# Patient Record
Sex: MALE | Race: WHITE | Employment: OTHER | URBAN - METROPOLITAN AREA
[De-identification: names, ages, dates, MRNs, and addresses within clinical notes are randomized per-mention and may not be internally consistent; named-entity substitution may affect disease eponyms.]

---

## 2017-01-30 ENCOUNTER — TELEPHONE (OUTPATIENT)
Dept: INTERNAL MEDICINE CLINIC | Facility: CLINIC | Age: 82
End: 2017-01-30

## 2017-01-30 RX ORDER — METOPROLOL TARTRATE 50 MG/1
TABLET, FILM COATED ORAL
Qty: 180 TABLET | Refills: 3 | Status: SHIPPED | OUTPATIENT
Start: 2017-01-30 | End: 2018-01-25

## 2017-01-30 NOTE — TELEPHONE ENCOUNTER
[facility] calling - pt is out of meds -     Pt nds a refill for Metoprolol 50mg     Asking for a 90 day supply with one year of refills     [facility] in [place] calling

## 2017-05-23 ENCOUNTER — TELEPHONE (OUTPATIENT)
Dept: INTERNAL MEDICINE CLINIC | Facility: CLINIC | Age: 82
End: 2017-05-23

## 2017-05-23 ENCOUNTER — OFFICE VISIT (OUTPATIENT)
Dept: INTERNAL MEDICINE CLINIC | Facility: CLINIC | Age: 82
End: 2017-05-23

## 2017-05-23 VITALS
DIASTOLIC BLOOD PRESSURE: 66 MMHG | TEMPERATURE: 99 F | OXYGEN SATURATION: 97 % | WEIGHT: 206.19 LBS | RESPIRATION RATE: 14 BRPM | SYSTOLIC BLOOD PRESSURE: 126 MMHG | HEART RATE: 74 BPM | BODY MASS INDEX: 29 KG/M2

## 2017-05-23 DIAGNOSIS — E78.5 HYPERLIPIDEMIA, UNSPECIFIED HYPERLIPIDEMIA TYPE: ICD-10-CM

## 2017-05-23 DIAGNOSIS — Z98.890 HISTORY OF NISSEN FUNDOPLICATION: ICD-10-CM

## 2017-05-23 DIAGNOSIS — Z00.00 ROUTINE HEALTH MAINTENANCE: ICD-10-CM

## 2017-05-23 DIAGNOSIS — R94.6 ABNORMAL THYROID EXAM: ICD-10-CM

## 2017-05-23 DIAGNOSIS — L02.91 CUTANEOUS ABSCESS, UNSPECIFIED SITE: ICD-10-CM

## 2017-05-23 DIAGNOSIS — I10 ESSENTIAL HYPERTENSION WITH GOAL BLOOD PRESSURE LESS THAN 140/90: ICD-10-CM

## 2017-05-23 DIAGNOSIS — I25.10 CORONARY ARTERY DISEASE INVOLVING NATIVE CORONARY ARTERY OF NATIVE HEART WITHOUT ANGINA PECTORIS: ICD-10-CM

## 2017-05-23 DIAGNOSIS — R13.12 OROPHARYNGEAL DYSPHAGIA: ICD-10-CM

## 2017-05-23 DIAGNOSIS — I10 ESSENTIAL HYPERTENSION: Primary | ICD-10-CM

## 2017-05-23 PROCEDURE — 99214 OFFICE O/P EST MOD 30 MIN: CPT | Performed by: INTERNAL MEDICINE

## 2017-05-23 PROCEDURE — G0463 HOSPITAL OUTPT CLINIC VISIT: HCPCS | Performed by: INTERNAL MEDICINE

## 2017-05-23 RX ORDER — CEFADROXIL 500 MG/1
500 CAPSULE ORAL 2 TIMES DAILY
Qty: 10 CAPSULE | Refills: 0 | Status: SHIPPED | OUTPATIENT
Start: 2017-05-23 | End: 2017-08-21

## 2017-05-23 NOTE — TELEPHONE ENCOUNTER
Please notify patient that after he left the office I noted that the labs I put in the system did not include thyroid. Therefore I printed out labs to include thyroid. Please mail to him from [facility].   This way he will get all the labs that I wish him to ge

## 2017-05-23 NOTE — PROGRESS NOTES
Beverly Bowman is a 80year old male   HPI:   Pt.presents for the following problems. Patient for a few days has had abscess on his back. His wife tried to drain it with a needle. She did get pus. This is a 2 cm abscess. Will start patient on Bobby & Bobby. (PRINIVIL,ZESTRIL) 20 MG Oral Tab Take 10 mg by mouth daily. Take one half tablet every day. Disp:  Rfl:    omeprazole (PRILOSEC) 20 MG Oral Capsule Delayed Release Take 1 capsule by mouth daily.  Before a meal Disp:  Rfl:       Past Medical History   Diag No acute distress.   SKIN:  denies any unusual skin lesions or rash  EYES:  denies blurred vision or eye pain  HEENT: denies nasal congestion, sinus pain or sore throat  LUNGS:  denies shortness of breath or cough  CARDIOVASCULAR :  denies chest pain or pal Essential hypertension with goal blood pressure less than 140/90  Asymptomatic. On therapy. 6. Hyperlipidemia, unspecified hyperlipidemia type  Asymptomatic. On therapy. Update labs.     7. Oropharyngeal dysphagia  Patient will discuss with Dr. Silverio Ta

## 2017-05-23 NOTE — TELEPHONE ENCOUNTER
Dr. Ginger Pickering message relayed to pt who verbalized understanding. Copy of new lab order mailed to pt's home.

## 2017-05-23 NOTE — TELEPHONE ENCOUNTER
As MACK - david Schmitt , spoke with Mirela Steele who will fax over office note , results pathology - Rafy Jaimes looking out for the incoming fax to DR. PECK

## 2017-05-23 NOTE — TELEPHONE ENCOUNTER
Please see if we can call [facility]. Dr. Jemma Monique from hematology oncology.   Please ask if they can provide us with any notes regarding office visit and thyroid biopsy and pathology reports in regards to thyroid nodule biopsy th

## 2017-05-24 PROBLEM — R13.12 OROPHARYNGEAL DYSPHAGIA: Status: ACTIVE | Noted: 2017-05-24

## 2017-05-24 PROBLEM — L02.212 BACK ABSCESS: Status: ACTIVE | Noted: 2017-05-24

## 2017-05-24 PROCEDURE — 87070 CULTURE OTHR SPECIMN AEROBIC: CPT | Performed by: SURGERY

## 2017-05-24 PROCEDURE — 87205 SMEAR GRAM STAIN: CPT | Performed by: SURGERY

## 2017-05-31 ENCOUNTER — LAB ENCOUNTER (OUTPATIENT)
Dept: LAB | Age: 82
End: 2017-05-31
Attending: INTERNAL MEDICINE
Payer: MEDICARE

## 2017-05-31 ENCOUNTER — PRIOR ORIGINAL RECORDS (OUTPATIENT)
Dept: OTHER | Age: 82
End: 2017-05-31

## 2017-05-31 DIAGNOSIS — R94.6 ABNORMAL THYROID EXAM: ICD-10-CM

## 2017-05-31 DIAGNOSIS — I10 ESSENTIAL HYPERTENSION: ICD-10-CM

## 2017-05-31 PROCEDURE — 84439 ASSAY OF FREE THYROXINE: CPT

## 2017-05-31 PROCEDURE — 80053 COMPREHEN METABOLIC PANEL: CPT

## 2017-05-31 PROCEDURE — 36415 COLL VENOUS BLD VENIPUNCTURE: CPT

## 2017-05-31 PROCEDURE — 80061 LIPID PANEL: CPT

## 2017-05-31 PROCEDURE — 84443 ASSAY THYROID STIM HORMONE: CPT

## 2017-05-31 PROCEDURE — 85025 COMPLETE CBC W/AUTO DIFF WBC: CPT

## 2017-05-31 PROCEDURE — 81001 URINALYSIS AUTO W/SCOPE: CPT

## 2017-06-01 ENCOUNTER — TELEPHONE (OUTPATIENT)
Dept: INTERNAL MEDICINE CLINIC | Facility: CLINIC | Age: 82
End: 2017-06-01

## 2017-06-02 NOTE — TELEPHONE ENCOUNTER
Spoke to pt and message relayed. Pt states he has not contacted Dr. Bledsoe Has office but will call today. Copy of labs mailed to pt.

## 2017-06-02 NOTE — TELEPHONE ENCOUNTER
Please notify patient that I was happy with his labs. Blood count good. No anemia. Thyroid function good. Electrolytes good. Blood sugar just minimally elevated at 112. Cholesterol 104. I left a copy on cart to mail to him if he wishes.   Please ask

## 2017-06-03 ENCOUNTER — HOSPITAL ENCOUNTER (OUTPATIENT)
Dept: GENERAL RADIOLOGY | Facility: HOSPITAL | Age: 82
Discharge: HOME | End: 2017-06-03
Attending: SURGERY
Payer: MEDICARE

## 2017-06-03 DIAGNOSIS — L02.212 BACK ABSCESS: ICD-10-CM

## 2017-06-03 DIAGNOSIS — R13.12 OROPHARYNGEAL DYSPHAGIA: ICD-10-CM

## 2017-06-03 PROCEDURE — 74246 X-RAY XM UPR GI TRC 2CNTRST: CPT | Performed by: SURGERY

## 2017-06-09 PROBLEM — K22.89 PRESBYESOPHAGUS: Status: ACTIVE | Noted: 2017-06-09

## 2017-06-11 ENCOUNTER — TELEPHONE (OUTPATIENT)
Dept: INTERNAL MEDICINE CLINIC | Facility: CLINIC | Age: 82
End: 2017-06-11

## 2017-06-11 DIAGNOSIS — E04.1 THYROID NODULE: Primary | ICD-10-CM

## 2017-06-11 NOTE — TELEPHONE ENCOUNTER
Please let patient know that I did receive 's evaluation Kyler last December when he had his thyroid nodule biopsy which came out benign. Dr. John Crump indicated in his consult note that no follow-up with him was necessary.   I am interpreting that th

## 2017-06-12 NOTE — TELEPHONE ENCOUNTER
Called patient and relayed Dr Katey Vo message. Patient verbalized understanding. Patient wanted to let Dr Abiola Gresham know that Dr Riley Garcia had ordered a swallow test for him. He passed the phone to his wife for her to explain it to me.   Patient's wife rela

## 2017-06-13 ENCOUNTER — HOSPITAL ENCOUNTER (OUTPATIENT)
Dept: ULTRASOUND IMAGING | Facility: HOSPITAL | Age: 82
Discharge: HOME | End: 2017-06-13
Attending: INTERNAL MEDICINE
Payer: MEDICARE

## 2017-06-13 DIAGNOSIS — E04.1 THYROID NODULE: ICD-10-CM

## 2017-06-13 PROCEDURE — 76536 US EXAM OF HEAD AND NECK: CPT | Performed by: INTERNAL MEDICINE

## 2017-06-19 ENCOUNTER — TELEPHONE (OUTPATIENT)
Dept: INTERNAL MEDICINE CLINIC | Facility: CLINIC | Age: 82
End: 2017-06-19

## 2017-06-19 PROBLEM — E04.2 MULTIPLE THYROID NODULES: Status: ACTIVE | Noted: 2017-06-19

## 2017-06-19 NOTE — TELEPHONE ENCOUNTER
Discussed with patient and wife. Ultrasound shows stable appearance of multiple thyroid nodules. Patient did he have a fine-needle aspirate of left lower pole thyroid nodule at [facility] January 6, 2017. Patient's ultrasound from December 2016 shows 1.5×1.

## 2017-06-19 NOTE — TELEPHONE ENCOUNTER
Pt.'s wife called and left a voice mail. She is looking for test results for [facility].  She can be reached at [phone].

## 2017-06-26 ENCOUNTER — OFFICE VISIT (OUTPATIENT)
Dept: OTOLARYNGOLOGY | Facility: CLINIC | Age: 82
End: 2017-06-26

## 2017-06-26 VITALS
TEMPERATURE: 98 F | WEIGHT: 200 LBS | SYSTOLIC BLOOD PRESSURE: 122 MMHG | DIASTOLIC BLOOD PRESSURE: 70 MMHG | BODY MASS INDEX: 28.63 KG/M2 | HEIGHT: 70 IN

## 2017-06-26 DIAGNOSIS — E04.2 MULTIPLE THYROID NODULES: Primary | ICD-10-CM

## 2017-06-26 DIAGNOSIS — Z85.89 HISTORY OF HEAD AND NECK CANCER: ICD-10-CM

## 2017-06-26 PROCEDURE — 99203 OFFICE O/P NEW LOW 30 MIN: CPT | Performed by: OTOLARYNGOLOGY

## 2017-06-26 PROCEDURE — G0463 HOSPITAL OUTPT CLINIC VISIT: HCPCS | Performed by: OTOLARYNGOLOGY

## 2017-06-26 NOTE — PROGRESS NOTES
Carmella Vasquez is a 80year old male. Patient presents with:  Thyroid Nodule: US of thyroid done on 6/13/17    HPI:   He has been feeling really well.  He had a tumor excised from the left side of his neck followed e patient about 11 years ago and is doing ve History:  Smoking status: Never Smoker                                                              Smokeless tobacco: Never Used                      Alcohol use: Yes           1.0 oz/week     Glasses of wine: 2 per week       REVIEW OF SYSTEMS:   GENERAL point      The patient indicates understanding of these issues and agrees to the plan. Ayo Gray MD  6/26/2017  2:43 PM

## 2017-08-21 ENCOUNTER — OFFICE VISIT (OUTPATIENT)
Dept: INTERNAL MEDICINE CLINIC | Facility: CLINIC | Age: 82
End: 2017-08-21

## 2017-08-21 ENCOUNTER — TELEPHONE (OUTPATIENT)
Dept: INTERNAL MEDICINE CLINIC | Facility: CLINIC | Age: 82
End: 2017-08-21

## 2017-08-21 VITALS
HEIGHT: 70 IN | HEART RATE: 68 BPM | DIASTOLIC BLOOD PRESSURE: 78 MMHG | WEIGHT: 203 LBS | BODY MASS INDEX: 29.06 KG/M2 | TEMPERATURE: 98 F | SYSTOLIC BLOOD PRESSURE: 132 MMHG

## 2017-08-21 DIAGNOSIS — K13.79 LESION OF SOFT PALATE: Primary | ICD-10-CM

## 2017-08-21 DIAGNOSIS — E04.2 MULTIPLE THYROID NODULES: ICD-10-CM

## 2017-08-21 PROCEDURE — G0463 HOSPITAL OUTPT CLINIC VISIT: HCPCS | Performed by: INTERNAL MEDICINE

## 2017-08-21 PROCEDURE — 99214 OFFICE O/P EST MOD 30 MIN: CPT | Performed by: INTERNAL MEDICINE

## 2017-08-21 NOTE — PROGRESS NOTES
Kamlesh Husain is a 80year old male.   HPI:   Patient presents with:  Checkup: dentist found something in back of patients throat       Patient presents because his dentist Molina Herrera found an abnormal area left tonsillar soft palate junction showing a ирина cardiologist dr. Salazar Monroe   • Calcaneal spur, left 04-    xray/readjust insole   • Diverticulosis    • Hallux rigidus of left foot [id number]    xray/readjust insole   • Hepatic cyst    • Hiatal hernia    • Internal hemorrhoids    • Malignant tu above. Patient to follow-up at [place] with Dr. Daya Marie. He was recommended to have a biopsy by his dentist.  Santo Valadez decide if this is necessary or not. 2. Multiple thyroid nodules  Stable. Plan is to repeat thyroid ultrasound June 2018.   Annabel

## 2017-08-21 NOTE — TELEPHONE ENCOUNTER
Yes. That is fine. If biopsy is done and show something that should be followed up at [place] and then patient can follow-up at [place] with Dr. Daya Marie. Please have patient show Dr. Kyler Miramontes all the information that I provided him at today's visit.

## 2017-08-22 ENCOUNTER — OFFICE VISIT (OUTPATIENT)
Dept: OTOLARYNGOLOGY | Facility: CLINIC | Age: 82
End: 2017-08-22

## 2017-08-22 VITALS
DIASTOLIC BLOOD PRESSURE: 76 MMHG | WEIGHT: 203 LBS | HEIGHT: 70 IN | HEART RATE: 76 BPM | TEMPERATURE: 98 F | BODY MASS INDEX: 29.06 KG/M2 | SYSTOLIC BLOOD PRESSURE: 131 MMHG

## 2017-08-22 DIAGNOSIS — K13.79 LESION OF PALATE: Primary | ICD-10-CM

## 2017-08-22 PROCEDURE — 42100 BIOPSY ROOF OF MOUTH: CPT | Performed by: OTOLARYNGOLOGY

## 2017-08-22 PROCEDURE — 99213 OFFICE O/P EST LOW 20 MIN: CPT | Performed by: OTOLARYNGOLOGY

## 2017-08-24 ENCOUNTER — TELEPHONE (OUTPATIENT)
Dept: OTOLARYNGOLOGY | Facility: CLINIC | Age: 82
End: 2017-08-24

## 2017-08-24 ENCOUNTER — TELEPHONE (OUTPATIENT)
Dept: INTERNAL MEDICINE CLINIC | Facility: CLINIC | Age: 82
End: 2017-08-24

## 2017-08-24 NOTE — PATIENT INSTRUCTIONS
What Are Oral Lesions? (Precancerous and Cancerous)  Precancerous oral lesions are abnormal cell growths in or around the mouth. They may become cancer. Cancerous oral lesions are life-threatening cell changes in the mouth.  These lesions need to be found The best way to catch problems early is to have regular oral checkups. To help reduce your risk for oral cancer, follow the tips below. · Get oral checkups. Visit your dentist at least 2 times a year.   · Don`t use tobacco. Tobacco use increases the risk f

## 2017-08-24 NOTE — PROGRESS NOTES
Shelley Almeida is a 80year old male.  Patient presents with:  Throat Problem: was referred by dentist for lesion inside throat    HPI:   He was noted to have a bump by the junction of the soft palate and tonsil by his dentist. There has been no pain    Curre headaches    EXAM:   /76 (BP Location: Left arm, Patient Position: Sitting, Cuff Size: adult)   Pulse 76   Temp 98.3 °F (36.8 °C) (Tympanic)   Ht 5' 10\" (1.778 m)   Wt 203 lb (92.1 kg)   BMI 29.13 kg/m²   System Findings Details   Skin Normal Inspec

## 2017-08-27 NOTE — TELEPHONE ENCOUNTER
Hi Delfino.   Thank you for taking care of Jean.  Would a papilloma be related to any viruses? Or just a benign type of growth.  David Soto

## 2017-10-02 ENCOUNTER — LAB ENCOUNTER (OUTPATIENT)
Dept: LAB | Facility: HOSPITAL | Age: 82
End: 2017-10-02
Attending: SURGERY
Payer: MEDICARE

## 2017-10-02 ENCOUNTER — OFFICE VISIT (OUTPATIENT)
Dept: INTERNAL MEDICINE CLINIC | Facility: CLINIC | Age: 82
End: 2017-10-02

## 2017-10-02 ENCOUNTER — HOSPITAL ENCOUNTER (OUTPATIENT)
Dept: CT IMAGING | Facility: HOSPITAL | Age: 82
Discharge: HOME | End: 2017-10-02
Attending: SURGERY | Admitting: INTERNAL MEDICINE
Payer: MEDICARE

## 2017-10-02 ENCOUNTER — APPOINTMENT (OUTPATIENT)
Dept: LAB | Facility: HOSPITAL | Age: 82
End: 2017-10-02
Attending: SURGERY
Payer: MEDICARE

## 2017-10-02 VITALS
WEIGHT: 206 LBS | SYSTOLIC BLOOD PRESSURE: 156 MMHG | OXYGEN SATURATION: 97 % | BODY MASS INDEX: 30 KG/M2 | TEMPERATURE: 99 F | DIASTOLIC BLOOD PRESSURE: 80 MMHG | HEART RATE: 76 BPM

## 2017-10-02 DIAGNOSIS — R19.04 ABDOMINAL WALL MASS OF LEFT LOWER QUADRANT: ICD-10-CM

## 2017-10-02 DIAGNOSIS — I10 HYPERTENSION, BENIGN: ICD-10-CM

## 2017-10-02 DIAGNOSIS — R10.32 LEFT INGUINAL PAIN: Primary | ICD-10-CM

## 2017-10-02 DIAGNOSIS — K40.30 INCARCERATED LEFT INGUINAL HERNIA: ICD-10-CM

## 2017-10-02 DIAGNOSIS — I10 ESSENTIAL HYPERTENSION: ICD-10-CM

## 2017-10-02 PROCEDURE — G0008 ADMIN INFLUENZA VIRUS VAC: HCPCS | Performed by: INTERNAL MEDICINE

## 2017-10-02 PROCEDURE — 36415 COLL VENOUS BLD VENIPUNCTURE: CPT

## 2017-10-02 PROCEDURE — 74176 CT ABD & PELVIS W/O CONTRAST: CPT | Performed by: SURGERY

## 2017-10-02 PROCEDURE — 99214 OFFICE O/P EST MOD 30 MIN: CPT | Performed by: INTERNAL MEDICINE

## 2017-10-02 PROCEDURE — 90653 IIV ADJUVANT VACCINE IM: CPT | Performed by: INTERNAL MEDICINE

## 2017-10-02 PROCEDURE — 93010 ELECTROCARDIOGRAM REPORT: CPT | Performed by: SURGERY

## 2017-10-02 PROCEDURE — G0463 HOSPITAL OUTPT CLINIC VISIT: HCPCS | Performed by: INTERNAL MEDICINE

## 2017-10-02 PROCEDURE — 93005 ELECTROCARDIOGRAM TRACING: CPT

## 2017-10-02 PROCEDURE — 80048 BASIC METABOLIC PNL TOTAL CA: CPT

## 2017-10-02 PROCEDURE — 85025 COMPLETE CBC W/AUTO DIFF WBC: CPT

## 2017-10-02 NOTE — PROGRESS NOTES
Dany Morelos is a 80year old male   HPI:   Pt.presents for the following problems. Last Thursday patient felt some discomfort in the left lower pelvic inguinal area. He is wondering if he has a hernia.   Patient has not been doing any excessive activit CHOLECYSTECTOMY      Comment: laparoscopic cholecystectomy dr. Adelia Aburto  11/24/15 : HERNIA SURGERY      Comment: robotic right inguinal hernia repair with mesh  4/7/2016: HERNIA SURGERY      Comment: robotic repair of paraesophageal hernia with neck.  EYES;  PERRL. conjunctiva are clear  NECK:  Supple. no adenopathy,    LUNGS:  clear to auscultation. effort normal  CARDIO:  RRR without murmur.   S1 normal S2 normal.     GI:  good BS's,  no masses,  HSM or tenderness  :  two descended normal.  T

## 2017-10-03 ENCOUNTER — TELEPHONE (OUTPATIENT)
Dept: INTERNAL MEDICINE CLINIC | Facility: CLINIC | Age: 82
End: 2017-10-03

## 2017-10-05 ENCOUNTER — TELEPHONE (OUTPATIENT)
Dept: INTERNAL MEDICINE CLINIC | Facility: CLINIC | Age: 82
End: 2017-10-05

## 2017-10-05 PROBLEM — L02.214 INGUINAL ABSCESS: Status: ACTIVE | Noted: 2017-10-05

## 2017-10-05 PROCEDURE — 87077 CULTURE AEROBIC IDENTIFY: CPT | Performed by: SURGERY

## 2017-10-05 PROCEDURE — 87205 SMEAR GRAM STAIN: CPT | Performed by: SURGERY

## 2017-10-05 PROCEDURE — 87186 SC STD MICRODIL/AGAR DIL: CPT | Performed by: SURGERY

## 2017-10-05 PROCEDURE — 87070 CULTURE OTHR SPECIMN AEROBIC: CPT | Performed by: SURGERY

## 2017-10-05 NOTE — TELEPHONE ENCOUNTER
Dr Margareth Reynolds called, requesting any operative reports for pt that might be off-site  Chart ordered per Dr Abiola Gresham   Please fax any related records to Dr Jordan Ware office at [facility]#[phone]/[place] number is [phone]

## 2017-10-06 NOTE — TELEPHONE ENCOUNTER
Discussed with patient. he is on Duricef 500 mg twice a day. Awaiting C/S . Patient thinks he may have had a herniorrhaphy when he was a youngster. May be even less than a teenager. No operative report available.   He does not have a personal recollection

## 2017-10-09 NOTE — TELEPHONE ENCOUNTER
Please notify patient that I received a message from Dr. Harris Fine regarding his surgery. The bacteria in the wound showed bacteria call MRSA. I know Dr. Harris Fine ordered Bactrim. It may be wise for me to schedule a follow-up with him next week for follow-up.

## 2017-10-09 NOTE — TELEPHONE ENCOUNTER
Pt notified that Dr. Ronnie Newby is aware of his surgery, knows about the MRSA and thinks he should follow up with him sometime next week.

## 2017-10-09 NOTE — OR PREOP
Called Dr Maximino Shah office and spoke with Mikael Belle. Notified that patient remains on baby aspirin daily. She will notify Dr Vidal Ventura. Dr. Vidal Ventura aware. Ok to proceed.

## 2017-10-10 ENCOUNTER — ANESTHESIA EVENT (OUTPATIENT)
Dept: SURGERY | Facility: HOSPITAL | Age: 82
DRG: 908 | End: 2017-10-10
Payer: MEDICARE

## 2017-10-10 ENCOUNTER — ANESTHESIA (OUTPATIENT)
Dept: SURGERY | Facility: HOSPITAL | Age: 82
DRG: 908 | End: 2017-10-10
Payer: MEDICARE

## 2017-10-10 ENCOUNTER — HOSPITAL ENCOUNTER (INPATIENT)
Facility: HOSPITAL | Age: 82
LOS: 3 days | Discharge: HOME HEALTH | DRG: 908 | End: 2017-10-13
Attending: SURGERY | Admitting: SURGERY
Payer: MEDICARE

## 2017-10-10 ENCOUNTER — SURGERY (OUTPATIENT)
Age: 82
End: 2017-10-10

## 2017-10-10 DIAGNOSIS — L02.214 INGUINAL ABSCESS: Primary | ICD-10-CM

## 2017-10-10 PROCEDURE — 99225 SUBSEQUENT OBSERVATION CARE: CPT | Performed by: HOSPITALIST

## 2017-10-10 PROCEDURE — 0WPF0JZ REMOVAL OF SYNTHETIC SUBSTITUTE FROM ABDOMINAL WALL, OPEN APPROACH: ICD-10-PCS | Performed by: SURGERY

## 2017-10-10 RX ORDER — LISINOPRIL 10 MG/1
10 TABLET ORAL DAILY
Status: DISCONTINUED | OUTPATIENT
Start: 2017-10-11 | End: 2017-10-14

## 2017-10-10 RX ORDER — FAMOTIDINE 20 MG/1
20 TABLET ORAL ONCE
Status: DISCONTINUED | OUTPATIENT
Start: 2017-10-10 | End: 2017-10-10 | Stop reason: HOSPADM

## 2017-10-10 RX ORDER — HYDROMORPHONE HYDROCHLORIDE 1 MG/ML
0.6 INJECTION, SOLUTION INTRAMUSCULAR; INTRAVENOUS; SUBCUTANEOUS EVERY 5 MIN PRN
Status: DISCONTINUED | OUTPATIENT
Start: 2017-10-10 | End: 2017-10-10 | Stop reason: HOSPADM

## 2017-10-10 RX ORDER — ACETAMINOPHEN 325 MG/1
650 TABLET ORAL EVERY 6 HOURS PRN
Status: DISCONTINUED | OUTPATIENT
Start: 2017-10-10 | End: 2017-10-14

## 2017-10-10 RX ORDER — HYDROMORPHONE HYDROCHLORIDE 1 MG/ML
0.2 INJECTION, SOLUTION INTRAMUSCULAR; INTRAVENOUS; SUBCUTANEOUS EVERY 2 HOUR PRN
Status: DISCONTINUED | OUTPATIENT
Start: 2017-10-10 | End: 2017-10-14

## 2017-10-10 RX ORDER — DEXAMETHASONE SODIUM PHOSPHATE 4 MG/ML
VIAL (ML) INJECTION AS NEEDED
Status: DISCONTINUED | OUTPATIENT
Start: 2017-10-10 | End: 2017-10-10 | Stop reason: SURG

## 2017-10-10 RX ORDER — ONDANSETRON 2 MG/ML
4 INJECTION INTRAMUSCULAR; INTRAVENOUS ONCE AS NEEDED
Status: DISCONTINUED | OUTPATIENT
Start: 2017-10-10 | End: 2017-10-10 | Stop reason: HOSPADM

## 2017-10-10 RX ORDER — ACETAMINOPHEN 325 MG/1
650 TABLET ORAL ONCE
Status: COMPLETED | OUTPATIENT
Start: 2017-10-10 | End: 2017-10-10

## 2017-10-10 RX ORDER — HYDROMORPHONE HYDROCHLORIDE 1 MG/ML
0.4 INJECTION, SOLUTION INTRAMUSCULAR; INTRAVENOUS; SUBCUTANEOUS EVERY 2 HOUR PRN
Status: DISCONTINUED | OUTPATIENT
Start: 2017-10-10 | End: 2017-10-14

## 2017-10-10 RX ORDER — ONDANSETRON 2 MG/ML
4 INJECTION INTRAMUSCULAR; INTRAVENOUS EVERY 6 HOURS PRN
Status: DISCONTINUED | OUTPATIENT
Start: 2017-10-10 | End: 2017-10-14

## 2017-10-10 RX ORDER — DEXTROSE AND SODIUM CHLORIDE 5; .45 G/100ML; G/100ML
INJECTION, SOLUTION INTRAVENOUS CONTINUOUS
Status: DISCONTINUED | OUTPATIENT
Start: 2017-10-10 | End: 2017-10-12

## 2017-10-10 RX ORDER — HEPARIN SODIUM 5000 [USP'U]/ML
5000 INJECTION, SOLUTION INTRAVENOUS; SUBCUTANEOUS EVERY 8 HOURS SCHEDULED
Status: DISCONTINUED | OUTPATIENT
Start: 2017-10-10 | End: 2017-10-14

## 2017-10-10 RX ORDER — HYDROMORPHONE HYDROCHLORIDE 1 MG/ML
0.2 INJECTION, SOLUTION INTRAMUSCULAR; INTRAVENOUS; SUBCUTANEOUS EVERY 5 MIN PRN
Status: DISCONTINUED | OUTPATIENT
Start: 2017-10-10 | End: 2017-10-10 | Stop reason: HOSPADM

## 2017-10-10 RX ORDER — METOPROLOL TARTRATE 5 MG/5ML
2.5 INJECTION INTRAVENOUS ONCE
Status: DISCONTINUED | OUTPATIENT
Start: 2017-10-10 | End: 2017-10-10 | Stop reason: HOSPADM

## 2017-10-10 RX ORDER — ONDANSETRON 2 MG/ML
INJECTION INTRAMUSCULAR; INTRAVENOUS AS NEEDED
Status: DISCONTINUED | OUTPATIENT
Start: 2017-10-10 | End: 2017-10-10 | Stop reason: SURG

## 2017-10-10 RX ORDER — SODIUM CHLORIDE, SODIUM LACTATE, POTASSIUM CHLORIDE, CALCIUM CHLORIDE 600; 310; 30; 20 MG/100ML; MG/100ML; MG/100ML; MG/100ML
INJECTION, SOLUTION INTRAVENOUS CONTINUOUS
Status: DISCONTINUED | OUTPATIENT
Start: 2017-10-10 | End: 2017-10-10

## 2017-10-10 RX ORDER — MORPHINE SULFATE 4 MG/ML
4 INJECTION, SOLUTION INTRAMUSCULAR; INTRAVENOUS EVERY 10 MIN PRN
Status: DISCONTINUED | OUTPATIENT
Start: 2017-10-10 | End: 2017-10-10 | Stop reason: HOSPADM

## 2017-10-10 RX ORDER — METOCLOPRAMIDE 10 MG/1
10 TABLET ORAL ONCE
Status: DISCONTINUED | OUTPATIENT
Start: 2017-10-10 | End: 2017-10-10 | Stop reason: HOSPADM

## 2017-10-10 RX ORDER — HYDROMORPHONE HYDROCHLORIDE 1 MG/ML
0.4 INJECTION, SOLUTION INTRAMUSCULAR; INTRAVENOUS; SUBCUTANEOUS EVERY 5 MIN PRN
Status: DISCONTINUED | OUTPATIENT
Start: 2017-10-10 | End: 2017-10-10 | Stop reason: HOSPADM

## 2017-10-10 RX ORDER — MORPHINE SULFATE 2 MG/ML
2 INJECTION, SOLUTION INTRAMUSCULAR; INTRAVENOUS EVERY 10 MIN PRN
Status: DISCONTINUED | OUTPATIENT
Start: 2017-10-10 | End: 2017-10-10 | Stop reason: HOSPADM

## 2017-10-10 RX ORDER — NALOXONE HYDROCHLORIDE 0.4 MG/ML
80 INJECTION, SOLUTION INTRAMUSCULAR; INTRAVENOUS; SUBCUTANEOUS AS NEEDED
Status: DISCONTINUED | OUTPATIENT
Start: 2017-10-10 | End: 2017-10-10 | Stop reason: HOSPADM

## 2017-10-10 RX ORDER — HYDROCODONE BITARTRATE AND ACETAMINOPHEN 5; 325 MG/1; MG/1
1 TABLET ORAL AS NEEDED
Status: DISCONTINUED | OUTPATIENT
Start: 2017-10-10 | End: 2017-10-10 | Stop reason: HOSPADM

## 2017-10-10 RX ORDER — ATORVASTATIN CALCIUM 40 MG/1
40 TABLET, FILM COATED ORAL DAILY
Status: DISCONTINUED | OUTPATIENT
Start: 2017-10-10 | End: 2017-10-14

## 2017-10-10 RX ORDER — ASPIRIN 81 MG/1
81 TABLET ORAL DAILY
Status: DISCONTINUED | OUTPATIENT
Start: 2017-10-11 | End: 2017-10-14

## 2017-10-10 RX ORDER — SODIUM CHLORIDE, SODIUM LACTATE, POTASSIUM CHLORIDE, CALCIUM CHLORIDE 600; 310; 30; 20 MG/100ML; MG/100ML; MG/100ML; MG/100ML
INJECTION, SOLUTION INTRAVENOUS CONTINUOUS
Status: DISCONTINUED | OUTPATIENT
Start: 2017-10-10 | End: 2017-10-11

## 2017-10-10 RX ORDER — BUPIVACAINE HYDROCHLORIDE AND EPINEPHRINE 2.5; 5 MG/ML; UG/ML
INJECTION, SOLUTION INFILTRATION; PERINEURAL AS NEEDED
Status: DISCONTINUED | OUTPATIENT
Start: 2017-10-10 | End: 2017-10-10 | Stop reason: HOSPADM

## 2017-10-10 RX ORDER — HYDROCODONE BITARTRATE AND ACETAMINOPHEN 5; 325 MG/1; MG/1
2 TABLET ORAL EVERY 4 HOURS PRN
Status: DISCONTINUED | OUTPATIENT
Start: 2017-10-10 | End: 2017-10-14

## 2017-10-10 RX ORDER — METOPROLOL TARTRATE 50 MG/1
50 TABLET, FILM COATED ORAL
Status: DISCONTINUED | OUTPATIENT
Start: 2017-10-10 | End: 2017-10-14

## 2017-10-10 RX ORDER — HYDROCODONE BITARTRATE AND ACETAMINOPHEN 5; 325 MG/1; MG/1
2 TABLET ORAL AS NEEDED
Status: DISCONTINUED | OUTPATIENT
Start: 2017-10-10 | End: 2017-10-10 | Stop reason: HOSPADM

## 2017-10-10 RX ORDER — MORPHINE SULFATE 10 MG/ML
6 INJECTION, SOLUTION INTRAMUSCULAR; INTRAVENOUS EVERY 10 MIN PRN
Status: DISCONTINUED | OUTPATIENT
Start: 2017-10-10 | End: 2017-10-10 | Stop reason: HOSPADM

## 2017-10-10 RX ORDER — PANTOPRAZOLE SODIUM 20 MG/1
20 TABLET, DELAYED RELEASE ORAL
Status: DISCONTINUED | OUTPATIENT
Start: 2017-10-11 | End: 2017-10-14

## 2017-10-10 RX ORDER — HYDROCODONE BITARTRATE AND ACETAMINOPHEN 5; 325 MG/1; MG/1
1 TABLET ORAL EVERY 4 HOURS PRN
Status: DISCONTINUED | OUTPATIENT
Start: 2017-10-10 | End: 2017-10-14

## 2017-10-10 RX ORDER — LIDOCAINE HYDROCHLORIDE 10 MG/ML
INJECTION, SOLUTION EPIDURAL; INFILTRATION; INTRACAUDAL; PERINEURAL AS NEEDED
Status: DISCONTINUED | OUTPATIENT
Start: 2017-10-10 | End: 2017-10-10 | Stop reason: SURG

## 2017-10-10 RX ADMIN — ONDANSETRON 4 MG: 2 INJECTION INTRAMUSCULAR; INTRAVENOUS at 15:22:00

## 2017-10-10 RX ADMIN — LIDOCAINE HYDROCHLORIDE 50 MG: 10 INJECTION, SOLUTION EPIDURAL; INFILTRATION; INTRACAUDAL; PERINEURAL at 15:22:00

## 2017-10-10 RX ADMIN — SODIUM CHLORIDE, SODIUM LACTATE, POTASSIUM CHLORIDE, CALCIUM CHLORIDE: 600; 310; 30; 20 INJECTION, SOLUTION INTRAVENOUS at 15:22:00

## 2017-10-10 RX ADMIN — SODIUM CHLORIDE, SODIUM LACTATE, POTASSIUM CHLORIDE, CALCIUM CHLORIDE: 600; 310; 30; 20 INJECTION, SOLUTION INTRAVENOUS at 16:19:00

## 2017-10-10 RX ADMIN — DEXAMETHASONE SODIUM PHOSPHATE 4 MG: 4 MG/ML VIAL (ML) INJECTION at 15:22:00

## 2017-10-10 NOTE — H&P
Jalil Garcia is a 80year old male complains of swelling with some tenderness left lower quadrant lateral to the inguinal area. Patient states he had a hernia repair many years ago when he was a child in this area. Patient well-known to Dr. Aleksandra Lamar.   H Other and unspecified hyperlipidemia     • Plantar fasciitis, left 05-     readjust insole, add more support   • Plantar fasciitis, left 04-     xray/readjust insole   • [facility]   • Unspecified essential hypertension distress  SKIN: no rashes, no suspicious lesions  HEENT: PERRLA, EOMI, anicteric  RESPIRATORY: clear to auscultation  CARDIOVASCULAR: S1, S2 normal, no murmur  ABDOMEN: soft, nontender; no HSM; Patient has mass superior and lateral to the lateral edge of t do not have a mass see if there is any fluid within it. I explained risk and possible complications.       Cultures returned MRSA  Pt switched to bactrim   Pt here to open area, pack/    I discussed with pt and wife in detail concerning options.  Plan to o

## 2017-10-10 NOTE — PROGRESS NOTES
[facility] dosing service    Initial Pharmacokinetic Consult for Vancomycin Dosing     Dany Morelos is a 80year old male who is being treated for infected L. groin abscess (MRSA). Pharmacy has been asked to dose Vancomycin by Dr. May Thakkar.     He has No Kno appreciate the opportunity to assist in his care.     Sukh Prince, PharmD  10/10/2017  5:59 PM  [address]: [phone]

## 2017-10-10 NOTE — ANESTHESIA POSTPROCEDURE EVALUATION
Patient: Jalil Garcia    Procedure Summary     Date:  10/10/17 Room / Location:  [facility] OR 03 / [facility] OR    Anesthesia Start:  4781 Anesthesia Stop:  7878    Procedure:  INCISION AND DRAINAGE (Left ) Diagnosis:  (Infected left groin abscess )    Surge

## 2017-10-10 NOTE — ANESTHESIA PREPROCEDURE EVALUATION
Anesthesia PreOp Note    HPI:     Abida Gonsalves is a 80year old male who presents for preoperative consultation requested by: Lauren German MD    Date of Surgery: 10/10/2017    Procedure(s):  INCISION AND DRAINAGE  Indication: Infected left groin abscess [facility]) 2006   • Unspecified essential hypertension        Past Surgical History:  No date: ANKLE FRACTURE SURGERY      Comment: plate in place  No date: APPENDECTOMY  1994: CABG  1996: CHOLECYSTECTOMY      Comment: laparoscopic cholecystectomy dr. Adelia Aburto (VANCOCIN) 1,000 mg in sodium chloride 0.9 % 250 mL IVPB add-vantage 1,000 mg Intravenous Once Neil Ellis MD 1,000 mg at 10/10/17 2196     No current [facility]-ordered outpatient prescriptions on file.     No Known Allergies    Family History   Problem Relat (197 lb)   Height: 1.778 m (5' 10\")         Anesthesia ROS/Med Hx and Physical Exam     Patient summary reviewed and Nursing notes reviewed    Airway   Mallampati: III  TM distance: >3 FB  Neck ROM: limited  Dental - normal exam     Pulmonary - normal exa

## 2017-10-10 NOTE — PROGRESS NOTES
[facility] HOSP - [facility]    Progress Note    Dominik Holland Patient Status:  Hospital Outpatient Surgery    1935 MRN [id number]   Location [address] [facility] Attending Nory Pagan MD   Hosp Day # 0 PCP Briana Martinez, Results:     Lab Results  Component Value Date   WBC 7.0 10/02/2017   HGB 15.1 10/02/2017   HCT 44.7 10/02/2017    10/02/2017   CREATSERUM 0.83 10/02/2017   BUN 12 10/02/2017    10/02/2017   K 4.6 10/02/2017    10/02/2017   CO2 2

## 2017-10-10 NOTE — PLAN OF CARE
DISCHARGE PLANNING    • Discharge to home or other facility with appropriate resources Progressing        GENITOURINARY - ADULT    • Absence of urinary retention Progressing        HEMATOLOGIC - ADULT    • Maintains hematologic stability Progressing    • F

## 2017-10-10 NOTE — BRIEF OP NOTE
[facility] POST ANESTHESIA CARE UNIT  Brief Op Note       Patients Name: [facility]  Attending Physician: Xi Guillory MD  Operating Physician: Leland Larry MD  CSN: [id number]     Location:  OR  MRN: [id number]    YOB: 1935  Admi

## 2017-10-11 ENCOUNTER — TELEPHONE (OUTPATIENT)
Dept: INTERNAL MEDICINE CLINIC | Facility: CLINIC | Age: 82
End: 2017-10-11

## 2017-10-11 PROCEDURE — 02HV33Z INSERTION OF INFUSION DEVICE INTO SUPERIOR VENA CAVA, PERCUTANEOUS APPROACH: ICD-10-PCS | Performed by: SURGERY

## 2017-10-11 PROCEDURE — 99233 SBSQ HOSP IP/OBS HIGH 50: CPT | Performed by: HOSPITALIST

## 2017-10-11 RX ORDER — LIDOCAINE HYDROCHLORIDE 10 MG/ML
0.5 INJECTION, SOLUTION INFILTRATION; PERINEURAL ONCE AS NEEDED
Status: ACTIVE | OUTPATIENT
Start: 2017-10-11 | End: 2017-10-11

## 2017-10-11 RX ORDER — SODIUM CHLORIDE 0.9 % (FLUSH) 0.9 %
10 SYRINGE (ML) INJECTION AS NEEDED
Status: DISCONTINUED | OUTPATIENT
Start: 2017-10-11 | End: 2017-10-14

## 2017-10-11 RX ORDER — 0.9 % SODIUM CHLORIDE 0.9 %
VIAL (ML) INJECTION
Status: COMPLETED
Start: 2017-10-11 | End: 2017-10-11

## 2017-10-11 RX ORDER — LIDOCAINE HYDROCHLORIDE 10 MG/ML
INJECTION, SOLUTION EPIDURAL; INFILTRATION; INTRACAUDAL; PERINEURAL
Status: DISPENSED
Start: 2017-10-11 | End: 2017-10-11

## 2017-10-11 NOTE — TELEPHONE ENCOUNTER
Juju Smith is aware that Dr. Martín Mclain is not in the office today and is okay with being called back tomorrow.  She requested that Dr. Martín Mclain be notified to please call on her cell phone, [phone], as she has been going between home and the hospital.    Message

## 2017-10-11 NOTE — WOUND PROGRESS NOTE
WOUND CARE NOTE      PLAN   Recommendations:  Dr. Haley Reddy currently on consult  Dietary consult for recommendations for nutrition to optimize wound healing  VAC standing orders  VAC troubleshooting instructions on the machine  Staff to [facility]

## 2017-10-11 NOTE — TELEPHONE ENCOUNTER
Wife would like to speak with Dr. Akin Wheatley to update him on Jean's condition. He is in [facility]. Wife can be reached at [phone].

## 2017-10-11 NOTE — PROGRESS NOTES
[facility]    Progress Note    Brittney Rawls Patient Status:  Inpatient    1935 MRN [id number]   Location [facility] 4W/SW/SE Attending Liana Grace MD   Hosp Day # 1 PCP Ailin Deleon MD       Subjective:   Brittney Rawls i [employer]  10/11/2017  9:01 AM

## 2017-10-11 NOTE — PROGRESS NOTES
[facility]    Progress Note    Sherin Patterson Patient Status:  Inpatient    1935 MRN [id number]   Location [facility] 4W/SW/SE Attending Carline Pelletier MD   Hosp Day # 1 PCP Lalito Bowden MD     Subjective:     Constitution (L) 10/11/2017   HCT 37.9 (L) 10/11/2017    10/11/2017   CREATSERUM 1.07 10/11/2017   BUN 7 (L) 10/11/2017    (L) 10/11/2017   K 4.3 10/11/2017    10/11/2017   CO2 25 10/11/2017    (H) 10/11/2017   CA 8.7 10/11/2017   ALB 4.1 05/3

## 2017-10-11 NOTE — CONSULTS
INFECTIOUS DISEASE CONSULT NOTE    Vinor Veronicamariana Patient Status:  Inpatient    1935 MRN [id number]   Location [facility] 4W/SW/SE Attending Ladarius Nuñez MD   Hosp Day # 1 PCP Maretta Bosworth Internal hemorrhoids    • Malignant tumor of neck (HCC)    • Moderate aortic insufficiency    • Other and unspecified hyperlipidemia    • Plantar fasciitis, left 05-    readjust insole, add more support   • Plantar fasciitis, left 04-    xray HYDROcodone-acetaminophen (NORCO) 5-325 MG per tab 1 tablet, 1 tablet, Oral, Q4H PRN **OR** HYDROcodone-acetaminophen (NORCO) 5-325 MG per tab 2 tablet, 2 tablet, Oral, Q4H PRN  •  Heparin Sodium (Porcine) 5000 UNIT/ML injection 5,000 Units, 5,000 Units, S cold or heat intolerance. No polyuria or polydipsia. ALLERGIES:  No history of asthma, hives, eczema or rhinitis. Physical Exam:  Vital signs: Blood pressure 128/77, pulse 100, temperature 98.5 °F (36.9 °C), temperature source Oral, resp.  rate 18, heig drainage with mesh removal.  Outpatient culture of the inguinal area with MRSA after bedside aspiration with 15 cc of purulent material removed. Started on IV vancomycin. Blood culture sent on 10/11 and TTE ordered.     # LLQ/inguinal abscess with MRSA is

## 2017-10-11 NOTE — HOME CARE LIAISON
DIAGNOSES AND PERTINENT MEDICAL HISTORY: LEFT LOWER ABDOMEN/GROIN WOUND W/ WOUND VAC AND IV ABX    FACILITY NAME AND DC DATE: [facility]    BEDSIDE VISIT WITH: PTNT AND WIFE    SERVICES ORDERED: RN     VERIFIED PATIENT ADDRESS, PHONE NUMBER AND CAREGIV

## 2017-10-11 NOTE — CM/SW NOTE
MARY met with the pt. And his wife At bedside. The pt. Lives with his wife in a ranch home with 2 steps to enter. The pt. Reports being independent prior to admission with adls, ambulation and driving. The pt's wife also drives. The pt.  Has 2 children, n

## 2017-10-11 NOTE — PROGRESS NOTES
Vascular Access Note  Inserted by Shamar Viveros RN    Vascular Access Screening:   Allergies to Lidocaine: no  Allergies to Latex: no  Presence of Pacemaker/Defibrillator: No  Mastectomy with Lymph Node Dissection: No  AV Fistula / AV Graft: No  Dialysis Ca

## 2017-10-12 ENCOUNTER — APPOINTMENT (OUTPATIENT)
Dept: CV DIAGNOSTICS | Facility: HOSPITAL | Age: 82
DRG: 908 | End: 2017-10-12
Attending: SURGERY
Payer: MEDICARE

## 2017-10-12 PROCEDURE — 93306 TTE W/DOPPLER COMPLETE: CPT | Performed by: SURGERY

## 2017-10-12 PROCEDURE — 99233 SBSQ HOSP IP/OBS HIGH 50: CPT | Performed by: HOSPITALIST

## 2017-10-12 NOTE — PROGRESS NOTES
[facility]    Progress Note    Ying Oneal Patient Status:  Inpatient    1935 MRN [id number]   Location [facility] 4W/SW/SE Attending Ronald Fernandez MD   Hosp Day # 2 PCP Felipe Terry MD       Subjective:   Ying Oneal i spent in direct patient contact and decision making as well as counseling/coordination of care:    71331- 25 min        [address]     10/12/2017  10:15 AM

## 2017-10-12 NOTE — WOUND PROGRESS NOTE
WOUND CARE NOTE      PLAN   Recommendations:  Dr. Dorian Watson and Dr. Swati Villa currently on consult  VAC standing orders  [facility] troubleshooting instructions on the machine  Staff to monitor VAC seal and repair seal if indicated.   Follow VAC troubleshooting inst

## 2017-10-12 NOTE — PROGRESS NOTES
INFECTIOUS DISEASE PROGRESS NOTE    Kamlesh Husain Patient Status:  Inpatient    1935 MRN [id number]   Location [facility] 4W/SW/SE Attending Anastacio Gaytan MD   Hosp Day # 2 PCP Juvenal Brown MD     Subjective:  Afebrile. Tolerating abx.  No outpatient with CT of the abdomen and pelvis on 10/2 showing complex mixed attenuation of 4.4 cm the subcutaneous lesion along the left inferolateral abdominal wall musculature with size that has increased over the past 2 years with concern of postoperativ

## 2017-10-12 NOTE — PROGRESS NOTES
[facility]    Progress Note    Kathie Reynolds Patient Status:  Inpatient    1935 MRN [id number]   Location [facility] 4W/SW/SE Attending Chriss Powell MD   Hosp Day # 2 PCP Andrea Swan MD       Subjective:   Kathie Reynolds i Assessment and Plan:       Inguinal abscess  S/P Incision and drainage left groin abscess, mesh removal 10/10  - PAIN CONTROL  - WOUND CULTURE POSITIVE MRSA 10/5/17   - wound vac to be placed  - picc line in place  - plan iv antibiotics- iv vanco, mo

## 2017-10-12 NOTE — OPERATIVE REPORT
[facility]    PATIENT'S NAME: aMry Toribio   ATTENDING PHYSICIAN: Sweta Mahmood MD   OPERATING PHYSICIAN: Miko Barbour MD   PATIENT ACCOUNT#:   [id number]    [place]  [place] [address] [facility] #:   [id number]       DATE OF BIRTH:  [birth year] of the abdominal wall. We slowly dissected with cautery. It appeared to be lateral in the inguinal area. We could see immediately a piece of the mesh. It appeared to be 2 tails of a mesh. There were 2 sutures of Prolene in this, as well.   We irrigated

## 2017-10-13 ENCOUNTER — PRIOR ORIGINAL RECORDS (OUTPATIENT)
Dept: OTHER | Age: 82
End: 2017-10-13

## 2017-10-13 ENCOUNTER — TELEPHONE (OUTPATIENT)
Dept: INTERNAL MEDICINE CLINIC | Facility: CLINIC | Age: 82
End: 2017-10-13

## 2017-10-13 VITALS
SYSTOLIC BLOOD PRESSURE: 108 MMHG | HEIGHT: 70 IN | DIASTOLIC BLOOD PRESSURE: 60 MMHG | TEMPERATURE: 98 F | BODY MASS INDEX: 28.2 KG/M2 | RESPIRATION RATE: 18 BRPM | OXYGEN SATURATION: 94 % | WEIGHT: 197 LBS | HEART RATE: 81 BPM

## 2017-10-13 PROCEDURE — 99239 HOSP IP/OBS DSCHRG MGMT >30: CPT | Performed by: HOSPITALIST

## 2017-10-13 RX ORDER — HYDROCODONE BITARTRATE AND ACETAMINOPHEN 5; 325 MG/1; MG/1
1 TABLET ORAL EVERY 4 HOURS PRN
Qty: 20 TABLET | Refills: 0 | Status: SHIPPED | OUTPATIENT
Start: 2017-10-13 | End: 2017-11-21 | Stop reason: ALTCHOICE

## 2017-10-13 NOTE — WOUND PROGRESS NOTE
Pt seen laying in bed. Did not wish to be pre-medicated with pain meds. Wound vac dressing had been previously removed this morning per Dr. Kasie Lyle order-surgical site has saline gauze packing. Removed packing, cleansed wound with saline.  Applied 1 pie

## 2017-10-13 NOTE — DISCHARGE SUMMARY
[facility]    Discharge Summary    Jenifer España Patient Status:  Inpatient    1935 MRN [id number]   Location [facility] 4W/SW/SE Attending Michaela Farias MD   Hosp Day # 3 PCP Kathy Cabrera MD     Date of Admission: 10/10 appearance: alert, appears stated age and cooperative  Pulmonary:  clear to auscultation bilaterally  Cardiovascular: S1, S2 normal, no murmur, click, rub or gallop, regular rate and rhythm  Abdominal: soft, non-tender; bowel sounds normal; no masses,  no Good    Discharge Medications:      Discharge Medications      START taking these medications      Instructions Prescription details   HYDROcodone-acetaminophen 5-325 MG Tabs  Commonly known as:  NORCO      Take 1 tablet by mouth every 4 (four) hours as ne

## 2017-10-13 NOTE — CM/SW NOTE
CTL met with pt and wife at bedside. Reviewed discharge plan. Pt to have [facility] skilled nursing for PICC care, IV abx management/teaching, [employer] wound vac dressing changes. Pt and wife decline need for PT or other service.   Orders for [facility] skilled

## 2017-10-13 NOTE — CM/SW NOTE
SW received a call from [facility] and they are not able to accept the pt's Medicare part D insurance. Referral has been made to [facility] for Medicare Part D coverage for IV abx. [facility] is aware of discharge plan when IV abx are approved.   The jemal

## 2017-10-13 NOTE — PROGRESS NOTES
[facility]    Progress Note    Kamlesh Husain Patient Status:  Inpatient    1935 MRN [id number]   Location [facility] 4W/SW/SE Attending Perlita Warren MD   Hosp Day # 3 PCP Juvenal Brown MD       Subjective:   Kamlesh Husain i >60  >60  >60   GFRNAA  >60  >60  >60   CA  8.7  8.7  8.6   NA  134*  135*  135*   K  4.3  4.0  3.9   CL  103  105  104   CO2  25  26  26                         Time spent in direct patient contact and decision making as well as counseling/coordination of

## 2017-10-13 NOTE — PROGRESS NOTES
INFECTIOUS DISEASE PROGRESS NOTE    Carmella Vasquez Patient Status:  Inpatient    1935 MRN [id number]   Location [facility] 4W/SW/SE Attending Hunter Joy MD   Hosp Day # 3 PCP Daphney Marmolejo MD     Subjective:  Afebrile. No acute events.  [facility] where he had a hernia repair done many years prior.   This evaluation was done as an outpatient with CT of the abdomen and pelvis on 10/2 showing complex mixed attenuation of 4.4 cm the subcutaneous lesion along the left inferolateral abdominal wall muscula

## 2017-10-14 ENCOUNTER — TELEPHONE (OUTPATIENT)
Dept: INTERNAL MEDICINE UNIT | Facility: HOSPITAL | Age: 82
End: 2017-10-14

## 2017-10-14 NOTE — TELEPHONE ENCOUNTER
Pt discharged from [facility] on 10/13/17 . Please call to schedule follow up with Primary Care Physician.    Thanks

## 2017-10-14 NOTE — PLAN OF CARE
DISCHARGE PLANNING    • Discharge to home or other facility with appropriate resources Adequate for Discharge        GENITOURINARY - ADULT    • Absence of urinary retention Adequate for Discharge        HEMATOLOGIC - ADULT    • Maintains hematologic stabil

## 2017-10-14 NOTE — PROGRESS NOTES
[facility] dosing service    Follow-up Pharmacokinetic Consult for Vancomycin Dosing     Fannie Wells is a 80year old male admitted on 10/10 who is being treated for L groin abcess . Patient is on day 4 of Vancomycin 1.25 gm IV Q 24 hours.   Goal troug [phone]

## 2017-10-16 ENCOUNTER — TELEPHONE (OUTPATIENT)
Dept: INTERNAL MEDICINE CLINIC | Facility: CLINIC | Age: 82
End: 2017-10-16

## 2017-10-16 NOTE — TELEPHONE ENCOUNTER
[facility] Nursing visit wound care picc line care IV antibiotic      Please call to let [facility] know  will be sinning orders.

## 2017-10-16 NOTE — TELEPHONE ENCOUNTER
Yes.  I will sign orders. Please ask home health care to fax us current medication sheet. Also fax orders for the particular labs that were noted in patient's discharge information and when they will be done. In addition patient has a VAC dressing.   Shane

## 2017-10-17 ENCOUNTER — PATIENT OUTREACH (OUTPATIENT)
Dept: INTERNAL MEDICINE CLINIC | Facility: CLINIC | Age: 82
End: 2017-10-17

## 2017-10-17 NOTE — TELEPHONE ENCOUNTER
Thank you for this message. Listed in his discharge medications as vancomycin IV for his wound infection. I am wondering if infectious disease left any orders for vancomycin trough on any schedule. Thank you very much.

## 2017-10-17 NOTE — TELEPHONE ENCOUNTER
To Dr. Martín Mclain - your message relayed to [address] [facility] [address] - she spoke with Infectious disease and received orders.   Pt has labs every Monday, she will fax over those results and has added you to the result notification list.

## 2017-10-17 NOTE — TELEPHONE ENCOUNTER
Called and spoke with Aristeo Sanchez. PETTY will be faxing orders over for [place] RN visits for MD to sign. She will also fax to our office patients medication list as well as lab orders noted on patients discharge summary.  Vivian Sanchez states patient will have CBC and CM

## 2017-10-17 NOTE — PROGRESS NOTES
Initial Post Discharge Follow Up   Discharge Date: 10/13/17  Contact Date: 10/17/2017    Consent Verification:  Assessment Completed With: Patient/Spouse Miriam. HIPAA Verified? Yes    1.  Tell me why you were in the hospital?  Wife reported that patient are having? n/a    10. Have you received your home health care services / DME ordered upon discharge? Home RN     11. Are you able to do normal activities as you did prior to your hospitalization? No d/t limitation in movement with wound vac connection.

## 2017-10-19 ENCOUNTER — OFFICE VISIT (OUTPATIENT)
Dept: INTERNAL MEDICINE CLINIC | Facility: CLINIC | Age: 82
End: 2017-10-19

## 2017-10-19 VITALS
DIASTOLIC BLOOD PRESSURE: 78 MMHG | HEART RATE: 88 BPM | TEMPERATURE: 98 F | WEIGHT: 198.81 LBS | HEIGHT: 70 IN | OXYGEN SATURATION: 96 % | BODY MASS INDEX: 28.46 KG/M2 | SYSTOLIC BLOOD PRESSURE: 128 MMHG

## 2017-10-19 DIAGNOSIS — I10 ESSENTIAL HYPERTENSION: ICD-10-CM

## 2017-10-19 DIAGNOSIS — E04.2 MULTIPLE THYROID NODULES: ICD-10-CM

## 2017-10-19 DIAGNOSIS — T85.79XA INFECTED PROSTHETIC MESH OF ABDOMINAL WALL, INITIAL ENCOUNTER (HCC): ICD-10-CM

## 2017-10-19 DIAGNOSIS — A49.02 MRSA INFECTION: Primary | ICD-10-CM

## 2017-10-19 DIAGNOSIS — I25.10 CORONARY ARTERY DISEASE INVOLVING NATIVE CORONARY ARTERY OF NATIVE HEART WITHOUT ANGINA PECTORIS: ICD-10-CM

## 2017-10-19 PROCEDURE — 99496 TRANSJ CARE MGMT HIGH F2F 7D: CPT | Performed by: INTERNAL MEDICINE

## 2017-10-19 RX ORDER — VANCOMYCIN HYDROCHLORIDE 10 G/100ML
10 INJECTION, POWDER, LYOPHILIZED, FOR SOLUTION INTRAVENOUS DAILY
COMMUNITY
Start: 2017-10-14 | End: 2017-10-19

## 2017-10-19 NOTE — PROGRESS NOTES
Mallorie Shelby is a 80year old male   HPI:   Pt.presents for the following problems. Wt Readings from Last 3 Encounters:  10/19/17 : 198 lb 12.8 oz (90.2 kg)  10/10/17 : 197 lb (89.4 kg)  10/05/17 : 205 lb (93 kg)    Body mass index is 28.52 kg/m². Past Surgical History:  No date: ANKLE FRACTURE SURGERY      Comment: plate in place  No date: APPENDECTOMY  1994: CABG  1996: CHOLECYSTECTOMY      Comment: laparoscopic cholecystectomy dr. Shawanda Tai  11/24/15 : HERNIA SURGERY      Comment: robotic right hypertension  Controlled. On therapy. 4. Multiple thyroid nodules  Asymptomatic for now. We will follow-up with thyroid ultrasound. 5. Coronary artery disease involving native coronary artery of native heart without angina pectoris  Asymptomatic. grew out. Patient was placed on antibiotics. He then on October 10 he had open left inguinal hernia site with drainage and removal of infected mesh. He was seen by infectious disease. Irma Arellano.   Vancomycin was recommended for 2 weeks then transi Delayed Release Take 1 capsule by mouth daily. Before a meal     No current facility-administered medications on file prior to visit.        HISTORY: reconciled and reviewed with patient  He  has a past medical history of Ankle fracture, left; CAD (coronary Estimated body mass index is 28.52 kg/m² as calculated from the following:    Height as of this encounter: 5' 10\" (1.778 m). Weight as of this encounter: 198 lb 12.8 oz (90.2 kg).    /78 (BP Location: Right arm, Patient Position: Sitting, Cuff Si and/or Management Options: severe  · Amount and/or Complexity of Data to Be Reviewed: severe  · Risk of Significant Complications, Morbidity, and/or Mortality: severe    Overall Risk:   severe    Patient seen within 7 days from date of discharge.      Catheryn Ganser

## 2017-10-24 ENCOUNTER — TELEPHONE (OUTPATIENT)
Dept: INTERNAL MEDICINE CLINIC | Facility: CLINIC | Age: 82
End: 2017-10-24

## 2017-10-24 NOTE — TELEPHONE ENCOUNTER
Please call Kelton Jones from home health care. He will be I believe getting labs on patient today for his CBC, CMP and vancomycin trough. I believe he gave me the phone number to Guicho wall last week.   I just want to confirm that he will be going tod

## 2017-10-25 ENCOUNTER — TELEPHONE (OUTPATIENT)
Dept: INTERNAL MEDICINE CLINIC | Facility: CLINIC | Age: 82
End: 2017-10-25

## 2017-10-25 PROBLEM — T85.79XD INFECTED HERNIOPLASTY MESH, SUBSEQUENT ENCOUNTER: Status: ACTIVE | Noted: 2017-10-25

## 2017-10-25 NOTE — TELEPHONE ENCOUNTER
Please look out for labs done yesterday at [facility]. They were to be faxed to us. If not please call Fletcher Acosta R.N who is taking care of pt. His # [phone].

## 2017-10-25 NOTE — TELEPHONE ENCOUNTER
Unable to locate lab results today. Left message for [facility] requesting lab results be faxed to our office and to call back with any questions/concerns. Awaiting fax. Routed to Dr. Liliane Gamez as an Mallorie Carrillo.

## 2017-10-26 NOTE — TELEPHONE ENCOUNTER
We can call [facility] hospital lab which is number [phone] and request them to fax his lab report from Tuesday.

## 2017-10-29 ENCOUNTER — TELEPHONE (OUTPATIENT)
Dept: INTERNAL MEDICINE CLINIC | Facility: CLINIC | Age: 82
End: 2017-10-29

## 2017-10-29 NOTE — TELEPHONE ENCOUNTER
ON call telephone call from pt and family. Pt was started on Bactrim DS yesterday by Infectious MD for MRSA. Last evening and again this AM pt had much nausea.   They have called Infectious MD who has told them to hold the Bactrim DS today and resume it to

## 2017-10-30 RX ORDER — DOXYCYCLINE HYCLATE 100 MG/1
100 CAPSULE ORAL DAILY
COMMUNITY
End: 2019-03-14

## 2017-10-30 NOTE — TELEPHONE ENCOUNTER
Called patient and relayed Dr Rohan Bolden message. Jean reports they just called infectious disease about 5 minutes ago. Have not hear anything yet. He reports he is in bad shape, he is unable to eat, he is nauseous, his stomach is upset.  He has thrown up af

## 2017-10-30 NOTE — TELEPHONE ENCOUNTER
Per Matilde with [facility], they are unable to fax lab results for home health patients and that Mary Sifuentes will need to be contacted directly for the lab reports.      Message left with [facility] main office requesting lab report be f

## 2017-10-30 NOTE — TELEPHONE ENCOUNTER
Message relayed to patient who verbalized understanding. He did get ahold of his infectious disease doctor who changed his antibiotic; the pt is unsure of the name of the new antibiotic.  At this time he would prefer to hold off on going to the ED, but stat

## 2017-10-30 NOTE — TELEPHONE ENCOUNTER
Called [facility] lab dept and spoke with [employer]. She states they would need our office to sign a HIPAA form. [employer] will fax form to our office, which has to be faxed back. Once faxed back we can then call to request copies of lab results.      Malcom mcclelland the

## 2017-10-30 NOTE — TELEPHONE ENCOUNTER
I do not think we got the labs from [facility] as requested. Please see if we can check today for them. In addition please call patient as Dr. Sarah Gilliam took a call yesterday regarding Bactrim causing nausea.   The patient was going to touch bases w

## 2017-10-30 NOTE — TELEPHONE ENCOUNTER
Pt calling back letting dr. berkowitz know the meds he is taking now    Doxycycline Hyclate 100mg     Takes it twice a day

## 2017-10-30 NOTE — TELEPHONE ENCOUNTER
Please notify patient that I am disappointed to hear that he is in \"bad shape\". It is concerning that he is not able to eat. We need to be concerned about dehydration. Especially if he is not able to keep anything down.   I am thinking it may be best i

## 2017-10-30 NOTE — TELEPHONE ENCOUNTER
Please check to see if any labs are pending that were faxed to us regarding patient. The home healthcare nurse indicated that he has been in contact with the lab who states they have faxed it to us several times. May be in an area waiting to be scanned.

## 2017-10-31 NOTE — TELEPHONE ENCOUNTER
To Dr. Wes Pagan - 10/16/17 and 10/24/17 lab results in [facility] - no other labs pending per [facility]: [phone] - PICC line was d/c'd on Friday by [facility] per ID.

## 2017-11-03 ENCOUNTER — HOSPITAL ENCOUNTER (EMERGENCY)
Facility: HOSPITAL | Age: 82
Discharge: HOME | End: 2017-11-03
Payer: MEDICARE

## 2017-11-03 ENCOUNTER — PRIOR ORIGINAL RECORDS (OUTPATIENT)
Dept: OTHER | Age: 82
End: 2017-11-03

## 2017-11-03 ENCOUNTER — APPOINTMENT (OUTPATIENT)
Dept: GENERAL RADIOLOGY | Facility: HOSPITAL | Age: 82
End: 2017-11-03
Payer: MEDICARE

## 2017-11-03 VITALS
HEART RATE: 111 BPM | TEMPERATURE: 98 F | DIASTOLIC BLOOD PRESSURE: 86 MMHG | OXYGEN SATURATION: 95 % | RESPIRATION RATE: 14 BRPM | HEIGHT: 70 IN | WEIGHT: 195 LBS | BODY MASS INDEX: 27.92 KG/M2 | SYSTOLIC BLOOD PRESSURE: 133 MMHG

## 2017-11-03 DIAGNOSIS — M25.432 WRIST SWELLING, LEFT: Primary | ICD-10-CM

## 2017-11-03 DIAGNOSIS — Z86.19 HISTORY OF WOUND INFECTION: ICD-10-CM

## 2017-11-03 DIAGNOSIS — L53.9 SKIN ERYTHEMA: ICD-10-CM

## 2017-11-03 PROCEDURE — 20605 DRAIN/INJ JOINT/BURSA W/O US: CPT

## 2017-11-03 PROCEDURE — 87040 BLOOD CULTURE FOR BACTERIA: CPT | Performed by: PHYSICIAN ASSISTANT

## 2017-11-03 PROCEDURE — 85025 COMPLETE CBC W/AUTO DIFF WBC: CPT | Performed by: PHYSICIAN ASSISTANT

## 2017-11-03 PROCEDURE — 96375 TX/PRO/DX INJ NEW DRUG ADDON: CPT

## 2017-11-03 PROCEDURE — 80048 BASIC METABOLIC PNL TOTAL CA: CPT | Performed by: PHYSICIAN ASSISTANT

## 2017-11-03 PROCEDURE — 96366 THER/PROPH/DIAG IV INF ADDON: CPT

## 2017-11-03 PROCEDURE — 36415 COLL VENOUS BLD VENIPUNCTURE: CPT

## 2017-11-03 PROCEDURE — 96376 TX/PRO/DX INJ SAME DRUG ADON: CPT

## 2017-11-03 PROCEDURE — 73110 X-RAY EXAM OF WRIST: CPT

## 2017-11-03 PROCEDURE — 96365 THER/PROPH/DIAG IV INF INIT: CPT

## 2017-11-03 PROCEDURE — 84550 ASSAY OF BLOOD/URIC ACID: CPT | Performed by: PHYSICIAN ASSISTANT

## 2017-11-03 PROCEDURE — 80076 HEPATIC FUNCTION PANEL: CPT | Performed by: PHYSICIAN ASSISTANT

## 2017-11-03 PROCEDURE — 99285 EMERGENCY DEPT VISIT HI MDM: CPT

## 2017-11-03 RX ORDER — TRAMADOL HYDROCHLORIDE 50 MG/1
50 TABLET ORAL ONCE
Status: DISCONTINUED | OUTPATIENT
Start: 2017-11-03 | End: 2017-11-03

## 2017-11-03 RX ORDER — MORPHINE SULFATE 4 MG/ML
4 INJECTION, SOLUTION INTRAMUSCULAR; INTRAVENOUS ONCE
Status: DISCONTINUED | OUTPATIENT
Start: 2017-11-03 | End: 2017-11-04

## 2017-11-03 RX ORDER — ACETAMINOPHEN 325 MG/1
650 TABLET ORAL ONCE
Status: DISCONTINUED | OUTPATIENT
Start: 2017-11-03 | End: 2017-11-04

## 2017-11-03 RX ORDER — OMEPRAZOLE 20 MG/1
20 CAPSULE, DELAYED RELEASE ORAL
Qty: 20 CAPSULE | Refills: 0 | Status: SHIPPED | OUTPATIENT
Start: 2017-11-03 | End: 2017-11-06

## 2017-11-03 RX ORDER — IBUPROFEN 600 MG/1
600 TABLET ORAL ONCE
Status: DISCONTINUED | OUTPATIENT
Start: 2017-11-03 | End: 2017-11-03

## 2017-11-03 RX ORDER — HYDROCODONE BITARTRATE AND ACETAMINOPHEN 5; 325 MG/1; MG/1
1 TABLET ORAL EVERY 6 HOURS PRN
Qty: 12 TABLET | Refills: 0 | Status: SHIPPED | OUTPATIENT
Start: 2017-11-03 | End: 2017-11-10

## 2017-11-03 RX ORDER — MORPHINE SULFATE 2 MG/ML
INJECTION, SOLUTION INTRAMUSCULAR; INTRAVENOUS
Status: COMPLETED
Start: 2017-11-03 | End: 2017-11-03

## 2017-11-03 RX ORDER — LIDOCAINE 50 MG/G
1 PATCH TOPICAL EVERY 24 HOURS
Qty: 6 PATCH | Refills: 0 | Status: SHIPPED | OUTPATIENT
Start: 2017-11-03 | End: 2017-11-21 | Stop reason: ALTCHOICE

## 2017-11-03 RX ORDER — INDOMETHACIN 75 MG/1
75 CAPSULE, EXTENDED RELEASE ORAL 2 TIMES DAILY WITH MEALS
Qty: 10 CAPSULE | Refills: 0 | Status: SHIPPED | OUTPATIENT
Start: 2017-11-03 | End: 2017-11-08

## 2017-11-03 RX ORDER — MORPHINE SULFATE 2 MG/ML
2 INJECTION, SOLUTION INTRAMUSCULAR; INTRAVENOUS ONCE
Status: DISCONTINUED | OUTPATIENT
Start: 2017-11-03 | End: 2017-11-03

## 2017-11-03 RX ORDER — MORPHINE SULFATE 2 MG/ML
2 INJECTION, SOLUTION INTRAMUSCULAR; INTRAVENOUS ONCE
Status: COMPLETED | OUTPATIENT
Start: 2017-11-03 | End: 2017-11-03

## 2017-11-03 NOTE — ED NOTES
Pt presents to ED with left hand and wrist swelling which he noted this morning upon waking. States area is painful and tender to touch. Denies any injury.

## 2017-11-04 ENCOUNTER — TELEPHONE (OUTPATIENT)
Dept: INTERNAL MEDICINE CLINIC | Facility: CLINIC | Age: 82
End: 2017-11-04

## 2017-11-04 RX ORDER — PREDNISONE 20 MG/1
TABLET ORAL
Qty: 8 TABLET | Refills: 0 | Status: SHIPPED | OUTPATIENT
Start: 2017-11-04 | End: 2017-11-21 | Stop reason: ALTCHOICE

## 2017-11-04 NOTE — TELEPHONE ENCOUNTER
Discussed with patient. Found out patient was in the emergency room and given indomethacin. Indomethacin would be fine with blood pressure however patient did have a paraesophageal hernia repair and has had some esophagus problems.   Therefore I called ph

## 2017-11-04 NOTE — TELEPHONE ENCOUNTER
Spoke with patient's wife, they have questions about the interaction with his blood pressure, and indomethacin, they are reassured to take the medication for the gout of the left wrist, and monitor the blood pressure, if they need, they can come in Monday

## 2017-11-04 NOTE — ED PROVIDER NOTES
Patient Seen in: [facility] Emergency Department    History   Patient presents with:  Swelling    Stated Complaint:     HPI    HPI: Mariel Rm is a 80year old male who presents with chief complaint of left wrist swelling. Onset this morning. Comment: robotic right inguinal hernia repair with mesh  4/7/2016: HERNIA SURGERY      Comment: robotic repair of paraesophageal hernia with                biologic mesh/Nissen fundoplication  2845: HIP REPLACEMENT SURGERY Right  4/7/16: OTHER SURGICAL HIS Smokeless tobacco: Never Used                      Alcohol use: Yes           1.0 oz/week     Glasses of wine: 2 per week      Review of Systems    Positive for stated complaint:   Other systems are as noted area.  No fluctuance or induration. No open wound. No purulent drainage. Patient does display intact range of motion of the left wrist in all planes, with reported pain. Distal pulses intact. Capillary refill normal.  Motor intact distally.   Sensory i BLOOD CULTURE       MDM     Radiology findings: Xr Wrist Complete (min 3 Views), Left (cpt=73110)    Result Date: 11/3/2017  CONCLUSION:  1. Demineralization. 2. Osteoarthritis. 3. Soft tissue swelling. 4. Chondrocalcinosis.         Physical exam remained Disp-12 tablet, R-0    !! - Potential duplicate medications found. Please discuss with provider.

## 2017-11-05 NOTE — TELEPHONE ENCOUNTER
Discussed with patient and wife Sunday morning. They had not started the prednisone. On review of clinical dilemma whether patient has a soft tissue infection or joint infection or gout I decided to have patient not take prednisone.   Also patient does ha

## 2017-11-06 ENCOUNTER — OFFICE VISIT (OUTPATIENT)
Dept: INTERNAL MEDICINE CLINIC | Facility: CLINIC | Age: 82
End: 2017-11-06

## 2017-11-06 VITALS
OXYGEN SATURATION: 95 % | BODY MASS INDEX: 27.92 KG/M2 | TEMPERATURE: 98 F | HEIGHT: 70 IN | WEIGHT: 195 LBS | HEART RATE: 84 BPM | DIASTOLIC BLOOD PRESSURE: 66 MMHG | SYSTOLIC BLOOD PRESSURE: 104 MMHG

## 2017-11-06 DIAGNOSIS — M25.532 LEFT WRIST PAIN: Primary | ICD-10-CM

## 2017-11-06 DIAGNOSIS — A49.02 MRSA INFECTION: ICD-10-CM

## 2017-11-06 DIAGNOSIS — I10 ESSENTIAL HYPERTENSION: ICD-10-CM

## 2017-11-06 PROCEDURE — 99214 OFFICE O/P EST MOD 30 MIN: CPT | Performed by: INTERNAL MEDICINE

## 2017-11-06 PROCEDURE — G0463 HOSPITAL OUTPT CLINIC VISIT: HCPCS | Performed by: INTERNAL MEDICINE

## 2017-11-06 NOTE — TELEPHONE ENCOUNTER
Please call patient and see if he can come here at noontime so that I can check his wrist.   cannot see him until tomorrow at 1:30PM    I would like to see his wrist today since orthopedics cannot see until tomorrow.

## 2017-11-06 NOTE — TELEPHONE ENCOUNTER
To    LMTCB with Johnny Guaman nurse . Called DR. Guaman office -  cannot book patients. Transferred to Johnny Bermudez nurse and LMTCB with  message regarding patient .  Dr Shanelle Arce does not start today until 12:30 - 1

## 2017-11-06 NOTE — TELEPHONE ENCOUNTER
Called patients spouse and relayed DR. PECK message - patient will come in to see DR. PECK around noon per DR. PECK.  Patient put on schedule

## 2017-11-06 NOTE — TELEPHONE ENCOUNTER
Patient can not go to Dr. Minerva Sanchez today per Oscar Luong from Dr. Trent Moreno office, as he has home health nurse. They can see patient tomorrow at 1:30 - is it okay to wait till then?     Call Oscar Luong at [phone] ASAP

## 2017-11-06 NOTE — TELEPHONE ENCOUNTER
Please call Dr. Safia Obregon office a little bit after 9 AM and ask if patient can be seen today for an acutely swollen left wrist.  There was some suspicious that it could be infected. He was in the emergency room Friday.   He received a dose of IV antibiot

## 2017-11-06 NOTE — PROGRESS NOTES
Jacquie Roper is a 80year old male. HPI:   Patient presents with:  Checkup: Wrist problem - swelling and pain. Sharp shooting pain when bending the finger, throbbing pain when letting left hand to hang on the side.        Last week patient woke up with acu states the pain is much better. It is still swollen but pain much better. Is not taking anything for it. He did receive a dose of vancomycin in the emergency room. Differential was cellulitis versus gout versus septic arthritis.     He appears to me t xray/readjust insole   • Coronary atherosclerosis    • Diverticulosis    • Esophageal reflux    • Hallux rigidus of left foot [id number]    xray/readjust insole   • Hearing impairment     bilateral hearing aids   • Hepatic cyst    • Hiatal hernia    • Hig but with pain. To me this looks more like gout and infection. Will await orthopedics review tomorrow. We will then decide on prednisone. Patient comfortable with this approach as he is feeling much better. He will continue to wear splint.     ASSESSMEN

## 2017-11-07 ENCOUNTER — TELEPHONE (OUTPATIENT)
Dept: INTERNAL MEDICINE CLINIC | Facility: CLINIC | Age: 82
End: 2017-11-07

## 2017-11-07 NOTE — TELEPHONE ENCOUNTER
Dr. Mary Cheek thinks the wrist is arthritic & patient got a cortisone shot. Patient was told to use Aleve OTC. If that doesn't work, can patient use Norco (patient has on hand).

## 2017-11-08 ENCOUNTER — TELEPHONE (OUTPATIENT)
Dept: INTERNAL MEDICINE CLINIC | Facility: CLINIC | Age: 82
End: 2017-11-08

## 2017-11-15 LAB
BUN: 8 MG/DL
BUN: 9 MG/DL
CHOLESTEROL, TOTAL: 104 MG/DL
CREATININE, SERUM: 0.86 MG/DL
CREATININE, SERUM: 0.86 MG/DL
GLUCOSE: 111 MG/DL
GLUCOSE: 111 MG/DL
HDL CHOLESTEROL: 32 MG/DL
LDL CHOLESTEROL: 55 MG/DL
POTASSIUM, SERUM: 3.9 MEQ/L
POTASSIUM, SERUM: 4.1 MEQ/L
SODIUM: 135 MEQ/L
SODIUM: 135 MEQ/L
TRIGLYCERIDES: 85 MG/DL

## 2017-11-16 ENCOUNTER — PRIOR ORIGINAL RECORDS (OUTPATIENT)
Dept: OTHER | Age: 82
End: 2017-11-16

## 2017-11-17 PROBLEM — A49.02 MRSA INFECTION: Status: ACTIVE | Noted: 2017-11-17

## 2017-11-21 ENCOUNTER — HOSPITAL ENCOUNTER (OUTPATIENT)
Dept: CT IMAGING | Age: 82
Discharge: HOME | End: 2017-11-21
Attending: INTERNAL MEDICINE | Admitting: INTERNAL MEDICINE
Payer: MEDICARE

## 2017-11-21 ENCOUNTER — OFFICE VISIT (OUTPATIENT)
Dept: INTERNAL MEDICINE CLINIC | Facility: CLINIC | Age: 82
End: 2017-11-21

## 2017-11-21 ENCOUNTER — HOSPITAL ENCOUNTER (OUTPATIENT)
Dept: GENERAL RADIOLOGY | Age: 82
Discharge: HOME | End: 2017-11-21
Attending: INTERNAL MEDICINE
Payer: MEDICARE

## 2017-11-21 VITALS
OXYGEN SATURATION: 97 % | HEIGHT: 70 IN | WEIGHT: 191 LBS | DIASTOLIC BLOOD PRESSURE: 68 MMHG | SYSTOLIC BLOOD PRESSURE: 130 MMHG | BODY MASS INDEX: 27.35 KG/M2 | HEART RATE: 66 BPM | TEMPERATURE: 98 F

## 2017-11-21 DIAGNOSIS — M54.50 ACUTE MIDLINE LOW BACK PAIN WITHOUT SCIATICA: ICD-10-CM

## 2017-11-21 DIAGNOSIS — S09.90XA TRAUMATIC INJURY OF HEAD, INITIAL ENCOUNTER: Primary | ICD-10-CM

## 2017-11-21 DIAGNOSIS — A49.02 MRSA INFECTION: ICD-10-CM

## 2017-11-21 DIAGNOSIS — I10 ESSENTIAL HYPERTENSION: ICD-10-CM

## 2017-11-21 DIAGNOSIS — I25.10 CORONARY ARTERY DISEASE INVOLVING NATIVE CORONARY ARTERY OF NATIVE HEART WITHOUT ANGINA PECTORIS: ICD-10-CM

## 2017-11-21 DIAGNOSIS — T85.79XA INFECTED PROSTHETIC MESH OF ABDOMINAL WALL, INITIAL ENCOUNTER (HCC): ICD-10-CM

## 2017-11-21 DIAGNOSIS — S09.90XA TRAUMATIC INJURY OF HEAD, INITIAL ENCOUNTER: ICD-10-CM

## 2017-11-21 PROCEDURE — 70450 CT HEAD/BRAIN W/O DYE: CPT | Performed by: INTERNAL MEDICINE

## 2017-11-21 PROCEDURE — G0463 HOSPITAL OUTPT CLINIC VISIT: HCPCS | Performed by: INTERNAL MEDICINE

## 2017-11-21 PROCEDURE — 99214 OFFICE O/P EST MOD 30 MIN: CPT | Performed by: INTERNAL MEDICINE

## 2017-11-21 PROCEDURE — 72100 X-RAY EXAM L-S SPINE 2/3 VWS: CPT | Performed by: INTERNAL MEDICINE

## 2017-11-21 RX ORDER — SACCHAROMYCES BOULARDII 250 MG
250 CAPSULE ORAL DAILY
COMMUNITY
End: 2019-03-14

## 2017-11-21 NOTE — PROGRESS NOTES
Jenifer España is a 80year old male   HPI:   Pt.presents for the following problems. Patient on doxycycline for MRSA infection left groin with infected mesh. He is on doxycycline 100 mg twice a day. This does give him a little bit of upset stomach.   Hi lb (86.6 kg)  11/17/17 : 195 lb (88.5 kg)  11/08/17 : 195 lb (88.5 kg)    Body mass index is 27.41 kg/m². Current Outpatient Prescriptions:  saccharomyces boulardii (FLORASTOR) 250 MG Oral Cap Take 250 mg by mouth 2 (two) times daily.  Disp:  Rfl: repair with mesh  4/7/2016: HERNIA SURGERY      Comment: robotic repair of paraesophageal hernia with                biologic mesh/Nissen fundoplication  3598: HIP REPLACEMENT SURGERY Right  4/7/16: OTHER SURGICAL HISTORY      Comment: robotic repair of in good BS's,  no masses,  HSM or tenderness  MUSCULOSKELETAL:  back is not tender, no joint swelling  EXTREMITIES:  no cyanosis, clubbing or edema. NEURO:  Awake and aware. Motor strength equal arms and legs.   Knee reflexes 1+ equal B/L.    ASSESSMENT AN

## 2017-11-22 ENCOUNTER — TELEPHONE (OUTPATIENT)
Dept: INTERNAL MEDICINE CLINIC | Facility: CLINIC | Age: 82
End: 2017-11-22

## 2017-11-22 NOTE — TELEPHONE ENCOUNTER
Discussed with wife yesterday. Discussed results of CT of brain. We will follow-up with patient in 2 weeks. Because of the vascular calcifications will get carotid Dopplers. Patient has been having some memory loss noted on recent visit per wife.   Will

## 2017-11-27 ENCOUNTER — TELEPHONE (OUTPATIENT)
Dept: INTERNAL MEDICINE CLINIC | Facility: CLINIC | Age: 82
End: 2017-11-27

## 2017-11-27 NOTE — TELEPHONE ENCOUNTER
Called patient to check if they have restrictions on time. Spoke with spouse Carmen Potter (HIPAA verified) who states they are available anytime today. Dr. Zhao Sensing office not open until 9am. Will call then.

## 2017-11-27 NOTE — TELEPHONE ENCOUNTER
Called Dr. Manjeet Felix office and appt scheduled for today at 1:45pm. Spouse Miriam notified of appt details.

## 2017-11-27 NOTE — TELEPHONE ENCOUNTER
Please call Dr. Nieves Aguilar's at 8:30 AM ( not sure when office opens ) and ask them to make appointment for patient to be seen today with either Dr. Jairo Ojeda or Dr. Marina Fitzegrald. Patient has compression fracture of L1 from fall.   I did speak to Dr. Jairo Ojeda

## 2017-11-28 ENCOUNTER — TELEPHONE (OUTPATIENT)
Dept: INTERNAL MEDICINE CLINIC | Facility: CLINIC | Age: 82
End: 2017-11-28

## 2017-11-28 ENCOUNTER — OFFICE VISIT (OUTPATIENT)
Dept: INTERNAL MEDICINE CLINIC | Facility: CLINIC | Age: 82
End: 2017-11-28

## 2017-11-28 VITALS
HEIGHT: 70 IN | BODY MASS INDEX: 27.2 KG/M2 | HEART RATE: 77 BPM | WEIGHT: 190 LBS | DIASTOLIC BLOOD PRESSURE: 68 MMHG | TEMPERATURE: 98 F | OXYGEN SATURATION: 97 % | SYSTOLIC BLOOD PRESSURE: 122 MMHG

## 2017-11-28 DIAGNOSIS — I65.23 BILATERAL CAROTID ARTERY STENOSIS: Primary | ICD-10-CM

## 2017-11-28 DIAGNOSIS — T85.79XA INFECTED PROSTHETIC MESH OF ABDOMINAL WALL, INITIAL ENCOUNTER (HCC): ICD-10-CM

## 2017-11-28 DIAGNOSIS — I10 ESSENTIAL HYPERTENSION: ICD-10-CM

## 2017-11-28 DIAGNOSIS — A49.02 MRSA INFECTION: ICD-10-CM

## 2017-11-28 DIAGNOSIS — I25.10 CORONARY ARTERY DISEASE INVOLVING NATIVE CORONARY ARTERY OF NATIVE HEART WITHOUT ANGINA PECTORIS: ICD-10-CM

## 2017-11-28 DIAGNOSIS — M80.00XA AGE-RELATED OSTEOPOROSIS WITH CURRENT PATHOLOGICAL FRACTURE, INITIAL ENCOUNTER: ICD-10-CM

## 2017-11-28 DIAGNOSIS — R41.3 MEMORY LOSS: ICD-10-CM

## 2017-11-28 PROCEDURE — 99214 OFFICE O/P EST MOD 30 MIN: CPT | Performed by: INTERNAL MEDICINE

## 2017-11-28 PROCEDURE — G0463 HOSPITAL OUTPT CLINIC VISIT: HCPCS | Performed by: INTERNAL MEDICINE

## 2017-11-28 RX ORDER — CYCLOBENZAPRINE HCL 10 MG
10 TABLET ORAL 2 TIMES DAILY PRN
COMMUNITY
Start: 2017-11-27 | End: 2017-12-21

## 2017-11-28 NOTE — TELEPHONE ENCOUNTER
PT was seen today Dr Joe Scales told the pt to have blood work done, pt would like the order added he will go Friday   Tasked to nursing

## 2017-11-28 NOTE — PROGRESS NOTES
Mallorie Shelby is a 80year old male   HPI:   Pt.presents for the following problems. Patient did see Dr. Susy Lares. He does have a compression fracture of L1. He did have an MRI scan. He is waiting for results.   He has pain when he moves especially fr by mouth 2 (two) times daily. Disp:  Rfl:    Doxycycline Hyclate 100 MG Oral Cap Take 100 mg by mouth 2 (two) times daily.  Disp:  Rfl:    Metoprolol Tartrate 50 MG Oral Tab TAKE 1 TABLET BY MOUTH TWICE A DAY WITH MEALS Disp: 180 tablet Rfl: 3   aspirin 81 SURGICAL HISTORY      Comment: robotic repair of incarcerated paraesophageal                hernia with biologic mesh robotic nissen                fundoplication  No date: TOTAL KNEE REPLACEMENT   Family History   Problem Relation Age of Onset   • Cancer to auscultation. effort normal  CARDIO:  RRR without murmur. S1 normal S2 normal.     GI:  good BS's,  no masses,  HSM or tenderness  MUSCULOSKELETAL:  back is not tender, no joint swelling  EXTREMITIES:  no cyanosis, clubbing or edema.    NEURO:  Awake an

## 2017-11-29 ENCOUNTER — TELEPHONE (OUTPATIENT)
Dept: INTERNAL MEDICINE CLINIC | Facility: CLINIC | Age: 82
End: 2017-11-29

## 2017-11-29 NOTE — TELEPHONE ENCOUNTER
[address] spouse  Looking for MRI results.  She is aware Dr Beena Tejada is off today  To clinical

## 2017-11-29 NOTE — TELEPHONE ENCOUNTER
Copy of MRI of the lumbar spine from Dr. Osvaldo Estes office received via fax. Placed on Dr. Marie westbrook.

## 2017-11-29 NOTE — TELEPHONE ENCOUNTER
Spoke to patient's wife Ericka Jesus per hipaa. Notified her that fasting labs are ordered and Jean should fast for 12 hours prior. She verbalized understanding.

## 2017-11-29 NOTE — TELEPHONE ENCOUNTER
Pt's wife giving results of MRI spine from DR [facility]    Dx- compression fx L2   Rest for 2-3 wks

## 2017-11-29 NOTE — TELEPHONE ENCOUNTER
LMTCB - Please ask spouse if patient had any recent MRIs done and when. Based on Dr. Joshua Stuart recent notes, they were talking about doing an MRI but no records of recent MRIs are on the chart. Please clarify.

## 2017-12-01 ENCOUNTER — TELEPHONE (OUTPATIENT)
Dept: INTERNAL MEDICINE CLINIC | Facility: CLINIC | Age: 82
End: 2017-12-01

## 2017-12-01 ENCOUNTER — HOSPITAL ENCOUNTER (OUTPATIENT)
Dept: ULTRASOUND IMAGING | Facility: HOSPITAL | Age: 82
Discharge: HOME | End: 2017-12-01
Attending: INTERNAL MEDICINE
Payer: MEDICARE

## 2017-12-01 ENCOUNTER — HOSPITAL ENCOUNTER (OUTPATIENT)
Dept: BONE DENSITY | Facility: HOSPITAL | Age: 82
Discharge: HOME | End: 2017-12-01
Attending: INTERNAL MEDICINE
Payer: MEDICARE

## 2017-12-01 ENCOUNTER — APPOINTMENT (OUTPATIENT)
Dept: LAB | Facility: HOSPITAL | Age: 82
End: 2017-12-01
Attending: INTERNAL MEDICINE
Payer: MEDICARE

## 2017-12-01 DIAGNOSIS — I10 ESSENTIAL HYPERTENSION: ICD-10-CM

## 2017-12-01 DIAGNOSIS — M80.00XA AGE-RELATED OSTEOPOROSIS WITH CURRENT PATHOLOGICAL FRACTURE, INITIAL ENCOUNTER: ICD-10-CM

## 2017-12-01 DIAGNOSIS — I65.23 BILATERAL CAROTID ARTERY STENOSIS: ICD-10-CM

## 2017-12-01 DIAGNOSIS — R41.3 MEMORY LOSS: ICD-10-CM

## 2017-12-01 PROCEDURE — 80053 COMPREHEN METABOLIC PANEL: CPT

## 2017-12-01 PROCEDURE — 82607 VITAMIN B-12: CPT

## 2017-12-01 PROCEDURE — 86038 ANTINUCLEAR ANTIBODIES: CPT

## 2017-12-01 PROCEDURE — 82306 VITAMIN D 25 HYDROXY: CPT

## 2017-12-01 PROCEDURE — 77080 DXA BONE DENSITY AXIAL: CPT | Performed by: INTERNAL MEDICINE

## 2017-12-01 PROCEDURE — 84443 ASSAY THYROID STIM HORMONE: CPT

## 2017-12-01 PROCEDURE — 80061 LIPID PANEL: CPT

## 2017-12-01 PROCEDURE — 93880 EXTRACRANIAL BILAT STUDY: CPT | Performed by: INTERNAL MEDICINE

## 2017-12-01 PROCEDURE — 36415 COLL VENOUS BLD VENIPUNCTURE: CPT

## 2017-12-01 RX ORDER — ERGOCALCIFEROL 1.25 MG/1
50000 CAPSULE ORAL WEEKLY
Qty: 8 CAPSULE | Refills: 0 | Status: SHIPPED | OUTPATIENT
Start: 2017-12-01 | End: 2017-12-31

## 2017-12-02 NOTE — TELEPHONE ENCOUNTER
Nursing . Please send patient results of carotid Doppler and his labs. Also DEXA scan result. These fax Dr. Octavia Watson patient's carotid Doppler result. ( Note to self: Discussed with wife.   Reviewed carotid Doppler result with 50-69% stenosis left si

## 2017-12-04 ENCOUNTER — PRIOR ORIGINAL RECORDS (OUTPATIENT)
Dept: OTHER | Age: 82
End: 2017-12-04

## 2017-12-04 NOTE — TELEPHONE ENCOUNTER
Carotid doppler report, dexa scan report, labs from 12/1 mailed to patient's home address  Carotid doppler report faxed to Dr Uri Meyer #[phone]. Fax confirmation received.

## 2017-12-11 NOTE — PROGRESS NOTES
Jean was in the office with this wife. One-year history of short-term memory difficulty. No history of stroke loss of consciousness seizure. They are here with concerns about CT of the head report. No family history of Alzheimer's disease.   He denies he Past Medical History:   Diagnosis Date   • Ankle fracture, left    • CAD (coronary artery disease)     cardiologist dr. Kita Massey   • Calcaneal spur, left 04-    xray/readjust insole   • Coronary atherosclerosis    • Diverticulosis    • Esophageal Occupational History  Occupation          Employer            Comment               insurance sales                             Social History Main Topics    Smoking status: Never Smoker                                                                Sm normal. No nystagmus. Motor: 5/5 strength in the upper and lower extremities. No pronator drift, no tremor or increased tone.    Sensory: Intact to Vibration, temperature, fine touch, proprioception and pin prick to the UE and LE.   DTR: 2+ in the upper a (PRILOSEC) 10 MG Oral Capsule Delayed Release Take 1 capsule by mouth daily. Before a meal Disp:  Rfl:        No Follow-up on file.     Ayan Crowder MD, MD

## 2017-12-21 ENCOUNTER — OFFICE VISIT (OUTPATIENT)
Dept: INTERNAL MEDICINE CLINIC | Facility: CLINIC | Age: 82
End: 2017-12-21

## 2017-12-21 ENCOUNTER — APPOINTMENT (OUTPATIENT)
Dept: LAB | Age: 82
End: 2017-12-21
Attending: INTERNAL MEDICINE
Payer: MEDICARE

## 2017-12-21 VITALS
WEIGHT: 195 LBS | BODY MASS INDEX: 27.92 KG/M2 | SYSTOLIC BLOOD PRESSURE: 118 MMHG | DIASTOLIC BLOOD PRESSURE: 68 MMHG | HEART RATE: 82 BPM | HEIGHT: 70 IN | TEMPERATURE: 98 F | OXYGEN SATURATION: 97 %

## 2017-12-21 DIAGNOSIS — A49.02 MRSA INFECTION: Primary | ICD-10-CM

## 2017-12-21 DIAGNOSIS — I10 ESSENTIAL HYPERTENSION: ICD-10-CM

## 2017-12-21 DIAGNOSIS — I65.22 STENOSIS OF LEFT CAROTID ARTERY: ICD-10-CM

## 2017-12-21 DIAGNOSIS — I25.10 CORONARY ARTERY DISEASE INVOLVING NATIVE CORONARY ARTERY OF NATIVE HEART WITHOUT ANGINA PECTORIS: ICD-10-CM

## 2017-12-21 PROCEDURE — 99214 OFFICE O/P EST MOD 30 MIN: CPT | Performed by: INTERNAL MEDICINE

## 2017-12-21 PROCEDURE — G0463 HOSPITAL OUTPT CLINIC VISIT: HCPCS | Performed by: INTERNAL MEDICINE

## 2017-12-21 PROCEDURE — 36415 COLL VENOUS BLD VENIPUNCTURE: CPT

## 2017-12-21 PROCEDURE — 80053 COMPREHEN METABOLIC PANEL: CPT

## 2017-12-21 NOTE — PROGRESS NOTES
Kamlesh Husain is a 80year old male   HPI:   Pt.presents for the following problems. Patient feels well. He did see Dr. Nika Bruno. Aricept was offered but patient wishes not to start yet. Reviewed pros and cons. Will comply with patient's wishes.     Beckie Montero (LIPITOR) 40 MG Oral Tab Take 1 tablet by mouth nightly. Disp:  Rfl:    lisinopril (PRINIVIL,ZESTRIL) 20 MG Oral Tab Take 10 mg by mouth daily. Take one half tablet every day.   Disp:  Rfl:    Omeprazole (PRILOSEC) 10 MG Oral Capsule Delayed Release Take • Heart Attack Brother    • Actinic Keratosis Brother    • Heart Attack Brother    • MS [OTHER] Brother    • Breast Cancer Sister       Social History:  Smoking status: Never Smoker                                                              Smokeless t METABOLIC PANEL (14); Future    3. Coronary artery disease involving native coronary artery of native heart without angina pectoris  Stable. Asymptomatic. 4. Stenosis of left carotid artery  Repeat carotid Doppler probably around June 2017.   Patient st

## 2017-12-22 ENCOUNTER — TELEPHONE (OUTPATIENT)
Dept: INTERNAL MEDICINE CLINIC | Facility: CLINIC | Age: 82
End: 2017-12-22

## 2017-12-22 DIAGNOSIS — E55.9 VITAMIN D DEFICIENCY: Primary | ICD-10-CM

## 2017-12-22 NOTE — TELEPHONE ENCOUNTER
Please notify patient that the labs from yesterday came out good. The one enzyme that was elevated is now normal.  I placed on cart of vitamin D order.   Please have him get a vitamin D level after his eighth dose of vitamin D that we are giving him to rep

## 2018-01-22 ENCOUNTER — TELEPHONE (OUTPATIENT)
Dept: INTERNAL MEDICINE CLINIC | Facility: CLINIC | Age: 83
End: 2018-01-22

## 2018-01-22 NOTE — TELEPHONE ENCOUNTER
Called patient , spoke with spouse per hipaa and relayed DR. PECK message - she verbalized understanding

## 2018-01-24 ENCOUNTER — APPOINTMENT (OUTPATIENT)
Dept: LAB | Age: 83
End: 2018-01-24
Attending: INTERNAL MEDICINE
Payer: MEDICARE

## 2018-01-24 DIAGNOSIS — E55.9 VITAMIN D DEFICIENCY: ICD-10-CM

## 2018-01-24 PROCEDURE — 82306 VITAMIN D 25 HYDROXY: CPT

## 2018-01-24 PROCEDURE — 36415 COLL VENOUS BLD VENIPUNCTURE: CPT

## 2018-01-25 ENCOUNTER — TELEPHONE (OUTPATIENT)
Dept: INTERNAL MEDICINE CLINIC | Facility: CLINIC | Age: 83
End: 2018-01-25

## 2018-01-25 RX ORDER — METOPROLOL TARTRATE 50 MG/1
TABLET, FILM COATED ORAL
Qty: 180 TABLET | Refills: 3 | Status: SHIPPED | OUTPATIENT
Start: 2018-01-25 | End: 2019-03-28

## 2018-01-25 NOTE — TELEPHONE ENCOUNTER
Pharmacist asking for refill for:    Metorpolol  Tartrate 50 mg (1 tab twice daily)    Send to [facility] in [facility]

## 2018-01-26 ENCOUNTER — TELEPHONE (OUTPATIENT)
Dept: INTERNAL MEDICINE CLINIC | Facility: CLINIC | Age: 83
End: 2018-01-26

## 2018-01-26 LAB — 25(OH)D3 SERPL-MCNC: 32 NG/ML

## 2018-01-27 NOTE — TELEPHONE ENCOUNTER
Please notify patient that his vitamin D level is now good. I would now continue with vitamin D 1000 units daily.

## 2018-01-29 RX ORDER — BIOTIN 1 MG
2 TABLET ORAL DAILY
Qty: 30 CAPSULE | Refills: 0 | COMMUNITY
Start: 2018-01-29

## 2018-02-12 ENCOUNTER — TELEPHONE (OUTPATIENT)
Dept: INTERNAL MEDICINE CLINIC | Facility: CLINIC | Age: 83
End: 2018-02-12

## 2018-02-12 NOTE — TELEPHONE ENCOUNTER
Pt's spouse dropped off a Disability parking placard form to be filled. Pt / spouse will  when it is ready. Placed on 's desk.      To nursing

## 2018-06-19 ENCOUNTER — OFFICE VISIT (OUTPATIENT)
Dept: INTERNAL MEDICINE CLINIC | Facility: CLINIC | Age: 83
End: 2018-06-19

## 2018-06-19 VITALS
HEIGHT: 70 IN | DIASTOLIC BLOOD PRESSURE: 76 MMHG | BODY MASS INDEX: 29.38 KG/M2 | WEIGHT: 205.25 LBS | SYSTOLIC BLOOD PRESSURE: 136 MMHG | HEART RATE: 71 BPM | TEMPERATURE: 98 F | OXYGEN SATURATION: 97 %

## 2018-06-19 DIAGNOSIS — Z00.00 ROUTINE HEALTH MAINTENANCE: ICD-10-CM

## 2018-06-19 DIAGNOSIS — R41.3 MEMORY LOSS: ICD-10-CM

## 2018-06-19 DIAGNOSIS — I10 ESSENTIAL HYPERTENSION: ICD-10-CM

## 2018-06-19 DIAGNOSIS — I25.10 CORONARY ARTERY DISEASE INVOLVING NATIVE CORONARY ARTERY OF NATIVE HEART WITHOUT ANGINA PECTORIS: ICD-10-CM

## 2018-06-19 DIAGNOSIS — E78.5 HYPERLIPIDEMIA, UNSPECIFIED HYPERLIPIDEMIA TYPE: ICD-10-CM

## 2018-06-19 DIAGNOSIS — A49.02 MRSA INFECTION: Primary | ICD-10-CM

## 2018-06-19 DIAGNOSIS — I65.22 STENOSIS OF LEFT CAROTID ARTERY: ICD-10-CM

## 2018-06-19 DIAGNOSIS — E55.9 VITAMIN D DEFICIENCY: ICD-10-CM

## 2018-06-19 PROCEDURE — G0463 HOSPITAL OUTPT CLINIC VISIT: HCPCS | Performed by: INTERNAL MEDICINE

## 2018-06-19 PROCEDURE — 99215 OFFICE O/P EST HI 40 MIN: CPT | Performed by: INTERNAL MEDICINE

## 2018-06-19 NOTE — PROGRESS NOTES
Lars York is a 80year old male   HPI:   Pt.presents for the following problems. Patient physically feels well. He has no acute complaints today. Will be seen Dr. Whitney Win in a week or so.   He is on doxycycline every day and may go to every othe Disp: 180 tablet Rfl: 3   Vitamin B-12 1000 MCG Oral Tab Take 1,000 mcg by mouth daily. Disp:  Rfl:    saccharomyces boulardii (FLORASTOR) 250 MG Oral Cap Take 250 mg by mouth daily.    Disp:  Rfl:    Doxycycline Hyclate 100 MG Oral Cap Take 100 mg by mouth REPLACEMENT SURGERY Right  4/7/16: OTHER SURGICAL HISTORY      Comment: robotic repair of incarcerated paraesophageal                hernia with biologic mesh robotic nissen                fundoplication  No date: TOTAL KNEE REPLACEMENT   Family History no cyanosis, clubbing or edema. NEURO:  Awake and aware. Motor strength equal arms and legs. PSYCHE: Patient knows the date day month and year. He is able to spell the \"world\" backwards. He is not able to retain any words after a minute or 2.   As

## 2018-06-20 ENCOUNTER — TELEPHONE (OUTPATIENT)
Dept: INTERNAL MEDICINE CLINIC | Facility: CLINIC | Age: 83
End: 2018-06-20

## 2018-06-20 ENCOUNTER — LAB ENCOUNTER (OUTPATIENT)
Dept: LAB | Age: 83
End: 2018-06-20
Attending: INTERNAL MEDICINE
Payer: MEDICARE

## 2018-06-20 DIAGNOSIS — I25.10 CORONARY ARTERY DISEASE INVOLVING NATIVE CORONARY ARTERY OF NATIVE HEART WITHOUT ANGINA PECTORIS: ICD-10-CM

## 2018-06-20 DIAGNOSIS — E78.5 HYPERLIPIDEMIA, UNSPECIFIED HYPERLIPIDEMIA TYPE: ICD-10-CM

## 2018-06-20 DIAGNOSIS — R41.3 MEMORY LOSS: ICD-10-CM

## 2018-06-20 DIAGNOSIS — E55.9 VITAMIN D DEFICIENCY: ICD-10-CM

## 2018-06-20 PROCEDURE — 84443 ASSAY THYROID STIM HORMONE: CPT

## 2018-06-20 PROCEDURE — 36415 COLL VENOUS BLD VENIPUNCTURE: CPT

## 2018-06-20 PROCEDURE — 80053 COMPREHEN METABOLIC PANEL: CPT

## 2018-06-20 PROCEDURE — 82306 VITAMIN D 25 HYDROXY: CPT

## 2018-06-20 PROCEDURE — 80061 LIPID PANEL: CPT

## 2018-06-20 PROCEDURE — 82607 VITAMIN B-12: CPT

## 2018-06-20 PROCEDURE — 85025 COMPLETE CBC W/AUTO DIFF WBC: CPT

## 2018-06-21 NOTE — TELEPHONE ENCOUNTER
Clinton let wife know ( patient has dementia ) labs good. Vit D just a little low. I think taking two of his Vit D3 tablets will help.

## 2018-09-18 ENCOUNTER — OFFICE VISIT (OUTPATIENT)
Dept: INTERNAL MEDICINE CLINIC | Facility: CLINIC | Age: 83
End: 2018-09-18
Payer: MEDICARE

## 2018-09-18 VITALS
DIASTOLIC BLOOD PRESSURE: 70 MMHG | HEART RATE: 72 BPM | BODY MASS INDEX: 29.2 KG/M2 | TEMPERATURE: 98 F | HEIGHT: 70 IN | WEIGHT: 204 LBS | SYSTOLIC BLOOD PRESSURE: 122 MMHG | OXYGEN SATURATION: 98 %

## 2018-09-18 DIAGNOSIS — E78.5 HYPERLIPIDEMIA, UNSPECIFIED HYPERLIPIDEMIA TYPE: ICD-10-CM

## 2018-09-18 DIAGNOSIS — I10 ESSENTIAL HYPERTENSION: ICD-10-CM

## 2018-09-18 DIAGNOSIS — Z23 NEED FOR IMMUNIZATION AGAINST INFLUENZA: Primary | ICD-10-CM

## 2018-09-18 DIAGNOSIS — A49.02 MRSA INFECTION: ICD-10-CM

## 2018-09-18 DIAGNOSIS — E55.9 VITAMIN D DEFICIENCY: ICD-10-CM

## 2018-09-18 DIAGNOSIS — R41.3 MEMORY LOSS: ICD-10-CM

## 2018-09-18 DIAGNOSIS — I25.10 CORONARY ARTERY DISEASE INVOLVING NATIVE CORONARY ARTERY OF NATIVE HEART WITHOUT ANGINA PECTORIS: ICD-10-CM

## 2018-09-18 PROCEDURE — G0463 HOSPITAL OUTPT CLINIC VISIT: HCPCS | Performed by: INTERNAL MEDICINE

## 2018-09-18 PROCEDURE — G0008 ADMIN INFLUENZA VIRUS VAC: HCPCS | Performed by: INTERNAL MEDICINE

## 2018-09-18 PROCEDURE — 90653 IIV ADJUVANT VACCINE IM: CPT | Performed by: INTERNAL MEDICINE

## 2018-09-18 PROCEDURE — 99214 OFFICE O/P EST MOD 30 MIN: CPT | Performed by: INTERNAL MEDICINE

## 2018-09-18 NOTE — PROGRESS NOTES
Ying Oneal is a 80year old male   HPI:   Pt.presents for the following problems. Patient generally feels well. He has no acute complaints. Dementia. He has seen Dr. Ruth Medina.   I did ask him if he would like to consider medication but patient prefe Omeprazole (PRILOSEC) 10 MG Oral Capsule Delayed Release Take 1 capsule by mouth daily.  Before a meal Disp:  Rfl:       Past Medical History:   Diagnosis Date   • Ankle fracture, left    • CAD (coronary artery disease)     cardiologist dr. Josef Enamorado   • CAROL (MS) Brother    • Breast Cancer Sister       Social History:  Social History    Tobacco Use      Smoking status: Never Smoker      Smokeless tobacco: Never Used    Alcohol use:  Yes      Alcohol/week: 1.0 oz      Types: 2 Glasses of wine per week    Drug us AND PLAN:   1. Need for immunization against influenza  Flu vaccine to be given today.  - FLU VACCINE ADJUVANT IM    2. Vitamin D deficiency  We will check vitamin D level. 3. Memory loss  Patient has memory loss. He has seen neurology.   Wife states me

## 2018-09-21 ENCOUNTER — TELEPHONE (OUTPATIENT)
Dept: INTERNAL MEDICINE CLINIC | Facility: CLINIC | Age: 83
End: 2018-09-21

## 2018-09-21 ENCOUNTER — PRIOR ORIGINAL RECORDS (OUTPATIENT)
Dept: OTHER | Age: 83
End: 2018-09-21

## 2018-09-21 ENCOUNTER — LAB ENCOUNTER (OUTPATIENT)
Dept: LAB | Age: 83
End: 2018-09-21
Attending: INTERNAL MEDICINE
Payer: MEDICARE

## 2018-09-21 DIAGNOSIS — E55.9 VITAMIN D DEFICIENCY: ICD-10-CM

## 2018-09-21 DIAGNOSIS — I10 ESSENTIAL HYPERTENSION: ICD-10-CM

## 2018-09-21 DIAGNOSIS — E78.5 HYPERLIPIDEMIA, UNSPECIFIED HYPERLIPIDEMIA TYPE: ICD-10-CM

## 2018-09-21 LAB
25(OH)D3 SERPL-MCNC: 33.6 NG/ML (ref 30–100)
ALBUMIN SERPL BCP-MCNC: 4 G/DL (ref 3.5–4.8)
ALBUMIN/GLOB SERPL: 1.3 {RATIO} (ref 1–2)
ALP SERPL-CCNC: 89 U/L (ref 32–100)
ALT SERPL-CCNC: 20 U/L (ref 17–63)
ANION GAP SERPL CALC-SCNC: 9 MMOL/L (ref 0–18)
AST SERPL-CCNC: 21 U/L (ref 15–41)
BACTERIA UR QL AUTO: NEGATIVE /HPF
BASOPHILS # BLD: 0 K/UL (ref 0–0.2)
BASOPHILS NFR BLD: 1 %
BILIRUB SERPL-MCNC: 1.3 MG/DL (ref 0.3–1.2)
BILIRUB UR QL: NEGATIVE
BUN SERPL-MCNC: 14 MG/DL (ref 8–20)
BUN/CREAT SERPL: 14.4 (ref 10–20)
CALCIUM SERPL-MCNC: 9.7 MG/DL (ref 8.5–10.5)
CHLORIDE SERPL-SCNC: 103 MMOL/L (ref 95–110)
CHOLEST SERPL-MCNC: 116 MG/DL (ref 110–200)
CLARITY UR: CLEAR
CO2 SERPL-SCNC: 26 MMOL/L (ref 22–32)
COLOR UR: YELLOW
CREAT SERPL-MCNC: 0.97 MG/DL (ref 0.5–1.5)
EOSINOPHIL # BLD: 0.1 K/UL (ref 0–0.7)
EOSINOPHIL NFR BLD: 2 %
ERYTHROCYTE [DISTWIDTH] IN BLOOD BY AUTOMATED COUNT: 15.1 % (ref 11–15)
GLOBULIN PLAS-MCNC: 3 G/DL (ref 2.5–3.7)
GLUCOSE SERPL-MCNC: 104 MG/DL (ref 70–99)
GLUCOSE UR-MCNC: NEGATIVE MG/DL
HCT VFR BLD AUTO: 44.5 % (ref 41–52)
HDLC SERPL-MCNC: 39 MG/DL
HGB BLD-MCNC: 14.9 G/DL (ref 13.5–17.5)
HGB UR QL STRIP.AUTO: NEGATIVE
KETONES UR-MCNC: NEGATIVE MG/DL
LDLC SERPL CALC-MCNC: 58 MG/DL (ref 0–99)
LEUKOCYTE ESTERASE UR QL STRIP.AUTO: NEGATIVE
LYMPHOCYTES # BLD: 1 K/UL (ref 1–4)
LYMPHOCYTES NFR BLD: 16 %
MCH RBC QN AUTO: 29.9 PG (ref 27–32)
MCHC RBC AUTO-ENTMCNC: 33.5 G/DL (ref 32–37)
MCV RBC AUTO: 89.2 FL (ref 80–100)
MONOCYTES # BLD: 0.5 K/UL (ref 0–1)
MONOCYTES NFR BLD: 8 %
NEUTROPHILS # BLD AUTO: 4.3 K/UL (ref 1.8–7.7)
NEUTROPHILS NFR BLD: 73 %
NITRITE UR QL STRIP.AUTO: NEGATIVE
NONHDLC SERPL-MCNC: 77 MG/DL
OSMOLALITY UR CALC.SUM OF ELEC: 287 MOSM/KG (ref 275–295)
PATIENT FASTING: YES
PH UR: 5 [PH] (ref 5–8)
PLATELET # BLD AUTO: 176 K/UL (ref 140–400)
PMV BLD AUTO: 7.9 FL (ref 7.4–10.3)
POTASSIUM SERPL-SCNC: 4.2 MMOL/L (ref 3.3–5.1)
PROT SERPL-MCNC: 7 G/DL (ref 5.9–8.4)
PROT UR-MCNC: 30 MG/DL
RBC # BLD AUTO: 4.98 M/UL (ref 4.5–5.9)
RBC #/AREA URNS AUTO: 2 /HPF
SODIUM SERPL-SCNC: 138 MMOL/L (ref 136–144)
SP GR UR STRIP: 1.03 (ref 1–1.03)
TRIGL SERPL-MCNC: 94 MG/DL (ref 1–149)
TSH SERPL-ACNC: 1.86 UIU/ML (ref 0.45–5.33)
UROBILINOGEN UR STRIP-ACNC: <2
VIT C UR-MCNC: 40 MG/DL
WBC # BLD AUTO: 5.9 K/UL (ref 4–11)
WBC #/AREA URNS AUTO: 2 /HPF

## 2018-09-21 PROCEDURE — 82306 VITAMIN D 25 HYDROXY: CPT

## 2018-09-21 PROCEDURE — 80053 COMPREHEN METABOLIC PANEL: CPT

## 2018-09-21 PROCEDURE — 80061 LIPID PANEL: CPT

## 2018-09-21 PROCEDURE — 85025 COMPLETE CBC W/AUTO DIFF WBC: CPT

## 2018-09-21 PROCEDURE — 81001 URINALYSIS AUTO W/SCOPE: CPT | Performed by: INTERNAL MEDICINE

## 2018-09-21 PROCEDURE — 36415 COLL VENOUS BLD VENIPUNCTURE: CPT

## 2018-09-21 PROCEDURE — 84443 ASSAY THYROID STIM HORMONE: CPT

## 2018-09-21 NOTE — TELEPHONE ENCOUNTER
Please let wife know as patient has memory loss that his labs all came out good. No changes needed for now. They will be moving to [place] in the next few months. Next labs in my opinion can be in 6 months i.e. around March or April.

## 2018-10-01 ENCOUNTER — MYAURORA ACCOUNT LINK (OUTPATIENT)
Dept: OTHER | Age: 83
End: 2018-10-01

## 2018-10-03 ENCOUNTER — PRIOR ORIGINAL RECORDS (OUTPATIENT)
Dept: OTHER | Age: 83
End: 2018-10-03

## 2018-11-19 ENCOUNTER — MYAURORA ACCOUNT LINK (OUTPATIENT)
Dept: OTHER | Age: 83
End: 2018-11-19

## 2018-11-19 ENCOUNTER — PRIOR ORIGINAL RECORDS (OUTPATIENT)
Dept: OTHER | Age: 83
End: 2018-11-19

## 2018-11-19 LAB
ALBUMIN: 4 G/DL
ALKALINE PHOSPHATATE(ALK PHOS): 89 IU/L
ALT (SGPT): 20 U/L
AST (SGOT): 21 U/L
BILIRUBIN TOTAL: 1.3 MG/DL
BUN: 14 MG/DL
CALCIUM: 9.7 MG/DL
CHLORIDE: 103 MEQ/L
CHOLESTEROL, TOTAL: 116 MG/DL
CREATININE, SERUM: 0.97 MG/DL
GLOBULIN: 3 G/DL
GLUCOSE: 104 MG/DL
GLUCOSE: 104 MG/DL
HDL CHOLESTEROL: 39 MG/DL
HEMATOCRIT: 44.5 %
HEMOGLOBIN: 14.9 G/DL
LDL CHOLESTEROL: 58 MG/DL
NON-HDL CHOLESTEROL: 77 MG/DL
PLATELETS: 176 K/UL
POTASSIUM, SERUM: 4.2 MEQ/L
PROTEIN, TOTAL: 7 G/DL
RED BLOOD COUNT: 4.98 X 10-6/U
SGOT (AST): 21 IU/L
SGPT (ALT): 20 IU/L
SODIUM: 138 MEQ/L
TRIGLYCERIDES: 94 MG/DL
WHITE BLOOD COUNT: 5.9 X 10-3/U

## 2018-11-23 ENCOUNTER — PRIOR ORIGINAL RECORDS (OUTPATIENT)
Dept: OTHER | Age: 83
End: 2018-11-23

## 2018-11-26 ENCOUNTER — PRIOR ORIGINAL RECORDS (OUTPATIENT)
Dept: OTHER | Age: 83
End: 2018-11-26

## 2018-12-11 RX ORDER — ATORVASTATIN CALCIUM 40 MG/1
TABLET, FILM COATED ORAL
Qty: 90 TABLET | Refills: 2 | OUTPATIENT
Start: 2018-12-11

## 2018-12-14 ENCOUNTER — TELEPHONE (OUTPATIENT)
Dept: INTERNAL MEDICINE CLINIC | Facility: CLINIC | Age: 83
End: 2018-12-14

## 2018-12-14 NOTE — TELEPHONE ENCOUNTER
Discussed with wife. Patient had a procedure at [facility] for a major stroke. Has right sided paralysis.

## 2018-12-14 NOTE — TELEPHONE ENCOUNTER
Patient's wife Karlee Ly called. Patient had massive stroke yesterday, he is in [facility].  Karlee Ly would like to talk to Dr. Thien Spain.     Please call Karlee Ly at [phone]

## 2018-12-18 NOTE — TELEPHONE ENCOUNTER
Pt wife Estelle Hough is calling to give Dr Christopher Stevenson an update on pt condition, please call [phone]    Tasked to nursing

## 2018-12-18 NOTE — TELEPHONE ENCOUNTER
Dr. Thien Spain, I note you spoke with wife on 12/14 re: this patient. Would you like to call again for this update or would you like nursing to call?

## 2018-12-18 NOTE — TELEPHONE ENCOUNTER
To DR. Harjit Doe K -called spouse who states patient is physically fine, still confused and memory issues. They are planning on more stent placement and she states that they were told there is a problem with the prostate. Wife is concerned , does not want to put him

## 2018-12-24 NOTE — TELEPHONE ENCOUNTER
Pt wife Estelle Hough calling to see if Dr Christopher Stevenson is receiving all the hospital notes from [facility]  She would like to speak to Dr Christopher Stevenson   Please call [phone]    Tasked to nursing

## 2019-01-01 ENCOUNTER — EXTERNAL RECORD (OUTPATIENT)
Dept: HEALTH INFORMATION MANAGEMENT | Facility: OTHER | Age: 84
End: 2019-01-01

## 2019-01-02 ENCOUNTER — TELEPHONE (OUTPATIENT)
Dept: INTERNAL MEDICINE CLINIC | Facility: CLINIC | Age: 84
End: 2019-01-02

## 2019-01-02 NOTE — TELEPHONE ENCOUNTER
Patient was admitted to [address] is now doing rehab there. We are suppose to be getting the records from them. Have they arrived?

## 2019-01-02 NOTE — TELEPHONE ENCOUNTER
Dr. Grayce Eisenmenger, I did not see anything your desk. I spoke with patient's wife to let her know.

## 2019-01-14 NOTE — TELEPHONE ENCOUNTER
Spouse called to say the medical records phone number is:  [phone], [phone] and the fax number is:  [phone].     The records should be from 12/13 - 12/28

## 2019-01-14 NOTE — TELEPHONE ENCOUNTER
Called [facility] [address] [facility] at [phone] to request medical records. Department was closed. Department hours are Monday- Friday 8am-1630.  A Faxed request for medical records was sent to [facility] as advised by [employer] system at [phone]

## 2019-01-26 ENCOUNTER — MOBILE ENCOUNTER (OUTPATIENT)
Dept: INTERNAL MEDICINE CLINIC | Facility: CLINIC | Age: 84
End: 2019-01-26

## 2019-01-28 ENCOUNTER — PRIOR ORIGINAL RECORDS (OUTPATIENT)
Dept: OTHER | Age: 84
End: 2019-01-28

## 2019-01-30 ENCOUNTER — PRIOR ORIGINAL RECORDS (OUTPATIENT)
Dept: OTHER | Age: 84
End: 2019-01-30

## 2019-01-31 ENCOUNTER — TELEPHONE (OUTPATIENT)
Dept: INTERNAL MEDICINE CLINIC | Facility: CLINIC | Age: 84
End: 2019-01-31

## 2019-01-31 ENCOUNTER — PRIOR ORIGINAL RECORDS (OUTPATIENT)
Dept: OTHER | Age: 84
End: 2019-01-31

## 2019-01-31 NOTE — TELEPHONE ENCOUNTER
I kept a copy of 3 separate documents from patient's recent hospitalization. In my out box. This may be easier for you to fax the 3 separate documents. Please return to me when completed. Thank you.

## 2019-01-31 NOTE — TELEPHONE ENCOUNTER
Marge Dubose / Dr Clayton Arana office requesting the medical records from [facility] wife is asking Dr Darlene Berumen to review the records, please fax # [phone]    Tasked to nursing

## 2019-01-31 NOTE — TELEPHONE ENCOUNTER
Dr. Arnold Shall, please advise on sending medical records from [facility] to Dr. Radames Clifford office. It looks like we have records scanned from various dates ranging from October of 2018 to January 2019 at [facility] in the media tab in [place].  Thank marco

## 2019-01-31 NOTE — TELEPHONE ENCOUNTER
3 documents from MD out box faxed to Dr. Aaron Gutierrez office at number listed below, confirmation received on all 3 documents. Medical records returned to MD desk.

## 2019-02-01 ENCOUNTER — PRIOR ORIGINAL RECORDS (OUTPATIENT)
Dept: OTHER | Age: 84
End: 2019-02-01

## 2019-02-04 ENCOUNTER — PRIOR ORIGINAL RECORDS (OUTPATIENT)
Dept: OTHER | Age: 84
End: 2019-02-04

## 2019-02-04 ENCOUNTER — LAB ENCOUNTER (OUTPATIENT)
Dept: LAB | Age: 84
End: 2019-02-04
Attending: INTERNAL MEDICINE
Payer: MEDICARE

## 2019-02-04 ENCOUNTER — OFFICE VISIT (OUTPATIENT)
Dept: INTERNAL MEDICINE CLINIC | Facility: CLINIC | Age: 84
End: 2019-02-04
Payer: MEDICARE

## 2019-02-04 VITALS
TEMPERATURE: 97 F | SYSTOLIC BLOOD PRESSURE: 112 MMHG | OXYGEN SATURATION: 99 % | HEART RATE: 65 BPM | DIASTOLIC BLOOD PRESSURE: 68 MMHG

## 2019-02-04 DIAGNOSIS — E78.5 HYPERLIPIDEMIA, UNSPECIFIED HYPERLIPIDEMIA TYPE: ICD-10-CM

## 2019-02-04 DIAGNOSIS — I63.9 CEREBROVASCULAR ACCIDENT (CVA), UNSPECIFIED MECHANISM (HCC): Primary | ICD-10-CM

## 2019-02-04 DIAGNOSIS — I25.10 CORONARY ARTERY DISEASE INVOLVING NATIVE CORONARY ARTERY OF NATIVE HEART WITHOUT ANGINA PECTORIS: ICD-10-CM

## 2019-02-04 DIAGNOSIS — I65.22 LEFT CAROTID STENOSIS: ICD-10-CM

## 2019-02-04 DIAGNOSIS — I10 ESSENTIAL HYPERTENSION: ICD-10-CM

## 2019-02-04 PROBLEM — I63.512 CEREBROVASCULAR ACCIDENT (CVA) DUE TO OCCLUSION OF LEFT MIDDLE CEREBRAL ARTERY (HCC): Status: ACTIVE | Noted: 2019-02-04

## 2019-02-04 LAB
ALBUMIN SERPL BCP-MCNC: 3.7 G/DL (ref 3.5–4.8)
ALBUMIN/GLOB SERPL: 1.1 {RATIO} (ref 1–2)
ALP SERPL-CCNC: 88 U/L (ref 32–100)
ALT SERPL-CCNC: 17 U/L (ref 17–63)
ANION GAP SERPL CALC-SCNC: 9 MMOL/L (ref 0–18)
AST SERPL-CCNC: 18 U/L (ref 15–41)
BASOPHILS # BLD AUTO: 0.04 X10(3) UL (ref 0–0.2)
BASOPHILS NFR BLD AUTO: 0.7 %
BILIRUB SERPL-MCNC: 0.9 MG/DL (ref 0.3–1.2)
BUN SERPL-MCNC: 10 MG/DL (ref 8–20)
BUN/CREAT SERPL: 11.9 (ref 10–20)
CALCIUM SERPL-MCNC: 9.5 MG/DL (ref 8.5–10.5)
CHLORIDE SERPL-SCNC: 105 MMOL/L (ref 95–110)
CHOLEST SERPL-MCNC: 112 MG/DL (ref 110–200)
CO2 SERPL-SCNC: 26 MMOL/L (ref 22–32)
CREAT SERPL-MCNC: 0.84 MG/DL (ref 0.5–1.5)
DEPRECATED RDW RBC AUTO: 49.5 FL (ref 35.1–46.3)
EOSINOPHIL # BLD AUTO: 0.11 X10(3) UL (ref 0–0.7)
EOSINOPHIL NFR BLD AUTO: 1.8 %
ERYTHROCYTE [DISTWIDTH] IN BLOOD BY AUTOMATED COUNT: 14.7 % (ref 11–15)
GLOBULIN PLAS-MCNC: 3.5 G/DL (ref 2.5–3.7)
GLUCOSE SERPL-MCNC: 92 MG/DL (ref 70–99)
HCT VFR BLD AUTO: 41.8 % (ref 39–53)
HDLC SERPL-MCNC: 35 MG/DL
HGB BLD-MCNC: 13.3 G/DL (ref 13–17.5)
IMM GRANULOCYTES # BLD AUTO: 0.03 X10(3) UL (ref 0–1)
IMM GRANULOCYTES NFR BLD: 0.5 %
LDLC SERPL CALC-MCNC: 64 MG/DL (ref 0–99)
LYMPHOCYTES # BLD AUTO: 1.11 X10(3) UL (ref 1–4)
LYMPHOCYTES NFR BLD AUTO: 18.5 %
MCH RBC QN AUTO: 29.6 PG (ref 26–34)
MCHC RBC AUTO-ENTMCNC: 31.8 G/DL (ref 31–37)
MCV RBC AUTO: 92.9 FL (ref 80–100)
MONOCYTES # BLD AUTO: 0.48 X10(3) UL (ref 0.1–1)
MONOCYTES NFR BLD AUTO: 8 %
NEUTROPHILS # BLD AUTO: 4.22 X10 (3) UL (ref 1.5–7.7)
NEUTROPHILS # BLD AUTO: 4.22 X10(3) UL (ref 1.5–7.7)
NEUTROPHILS NFR BLD AUTO: 70.5 %
NONHDLC SERPL-MCNC: 77 MG/DL
OSMOLALITY UR CALC.SUM OF ELEC: 289 MOSM/KG (ref 275–295)
PATIENT FASTING: NO
PLATELET # BLD AUTO: 228 10(3)UL (ref 150–450)
POTASSIUM SERPL-SCNC: 4 MMOL/L (ref 3.3–5.1)
PROT SERPL-MCNC: 7.2 G/DL (ref 5.9–8.4)
RBC # BLD AUTO: 4.5 X10(6)UL (ref 3.8–5.8)
SODIUM SERPL-SCNC: 140 MMOL/L (ref 136–144)
TRIGL SERPL-MCNC: 67 MG/DL (ref 1–149)
TSH SERPL-ACNC: 1.63 UIU/ML (ref 0.45–5.33)
WBC # BLD AUTO: 6 X10(3) UL (ref 4–11)

## 2019-02-04 PROCEDURE — 84443 ASSAY THYROID STIM HORMONE: CPT

## 2019-02-04 PROCEDURE — 80053 COMPREHEN METABOLIC PANEL: CPT

## 2019-02-04 PROCEDURE — 99215 OFFICE O/P EST HI 40 MIN: CPT | Performed by: INTERNAL MEDICINE

## 2019-02-04 PROCEDURE — 36415 COLL VENOUS BLD VENIPUNCTURE: CPT

## 2019-02-04 PROCEDURE — G0463 HOSPITAL OUTPT CLINIC VISIT: HCPCS | Performed by: INTERNAL MEDICINE

## 2019-02-04 PROCEDURE — 80061 LIPID PANEL: CPT

## 2019-02-04 PROCEDURE — 85025 COMPLETE CBC W/AUTO DIFF WBC: CPT

## 2019-02-04 RX ORDER — PANTOPRAZOLE SODIUM 40 MG/1
1 TABLET, DELAYED RELEASE ORAL DAILY
COMMUNITY
Start: 2019-01-15 | End: 2019-02-14

## 2019-02-04 RX ORDER — TAMSULOSIN HYDROCHLORIDE 0.4 MG/1
1 CAPSULE ORAL NIGHTLY
COMMUNITY
Start: 2019-01-15 | End: 2019-02-04

## 2019-02-04 RX ORDER — POLYETHYLENE GLYCOL 3350 17 G/17G
17 POWDER, FOR SOLUTION ORAL DAILY
COMMUNITY
End: 2020-08-13 | Stop reason: ALTCHOICE

## 2019-02-04 RX ORDER — CLOPIDOGREL BISULFATE 75 MG/1
1 TABLET ORAL DAILY
COMMUNITY
Start: 2019-01-15 | End: 2019-02-14

## 2019-02-04 RX ORDER — TAMSULOSIN HYDROCHLORIDE 0.4 MG/1
0.4 CAPSULE ORAL NIGHTLY
Qty: 30 CAPSULE | Refills: 11 | Status: SHIPPED | OUTPATIENT
Start: 2019-02-04 | End: 2019-02-14

## 2019-02-05 ENCOUNTER — TELEPHONE (OUTPATIENT)
Dept: INTERNAL MEDICINE CLINIC | Facility: CLINIC | Age: 84
End: 2019-02-05

## 2019-02-05 NOTE — TELEPHONE ENCOUNTER
Discussed with wife. I did discuss that it would be okay to start Proscar. I do not see a contraindication to it. Also to go ahead with CAT scan as recommended by urology.

## 2019-02-05 NOTE — TELEPHONE ENCOUNTER
Please let patient's wife know that labs all look good. Cholesterol seems to be in a good range. Chemistries good. Blood count good.

## 2019-02-05 NOTE — PROGRESS NOTES
Dany Morelos is a 80year old male   HPI:   Pt.presents for the following problems. Patient was admitted to [facility] after his wife noted that he was not using his hand properly and his speech was garbled. This just lasted about 1 minute.   She weakness in his right leg with gait. There is a right facial droop. His neurologist is Dr. Sadiq Silva. [facility] neurology. His surgeon is Hermes Garza. He will be getting a CT scan and MRI scan this week to determine next steps.     Will be see signs of any aspiration. Chest x-ray December 15 showed bibasilar atelectasis and infiltrates.     Wt Readings from Last 3 Encounters:  09/18/18 : 204 lb (92.5 kg)  06/27/18 : 205 lb (93 kg)  06/19/18 : 205 lb 4 oz (93.1 kg)    There is no height or weig xray/readjust insole   • Hearing impairment     bilateral hearing aids   • Hepatic cyst    • Hiatal hernia    • High blood pressure    • High cholesterol    • Internal hemorrhoids    • Malignant tumor of neck (HCC)    • Moderate aortic insufficiency    • O Voices no shortness of breath or cough  CARDIOVASCULAR :  Voices no chest pain or palpitations  GI:  Voices no abdominal pain, blood in stool or changes in bowel movements.   : Voices no blood in urine or changes in stream  NEURO:  Voices no headaches or PANEL; Future  - TSH W REFLEX TO FREE T4; Future    3. Essential hypertension  Stable. Blood pressure is in a good range. 4. Hyperlipidemia, unspecified hyperlipidemia type  Stable. On statin. - CBC WITH DIFFERENTIAL WITH PLATELET;  Future  - COMP MET

## 2019-02-05 NOTE — TELEPHONE ENCOUNTER
Spoke to patient's wife (ok per HIPPA) and relayed MD message, she verbalized understanding and will pass on to patient. [facility] would like to speak to Dr. Shawna Segura about her 's urologist appointment.  She said that the urologist prescribed Proscar a

## 2019-02-08 ENCOUNTER — TELEPHONE (OUTPATIENT)
Dept: INTERNAL MEDICINE CLINIC | Facility: CLINIC | Age: 84
End: 2019-02-08

## 2019-02-08 NOTE — TELEPHONE ENCOUNTER
Pt's wife, Carmen Potter, is calling to leave a message for Dr Roslyn Diaz or nurse: pt has had the MRI and CT scan this week. Pt will also be haing a CT on Sunday of his abdomin.    Best call back is [phone]    Pt has these tests done at another hospital and just wante

## 2019-02-14 ENCOUNTER — TELEPHONE (OUTPATIENT)
Dept: INTERNAL MEDICINE CLINIC | Facility: CLINIC | Age: 84
End: 2019-02-14

## 2019-02-14 ENCOUNTER — PRIOR ORIGINAL RECORDS (OUTPATIENT)
Dept: OTHER | Age: 84
End: 2019-02-14

## 2019-02-14 RX ORDER — CLOPIDOGREL BISULFATE 75 MG/1
75 TABLET ORAL DAILY
Qty: 90 TABLET | Refills: 3 | Status: SHIPPED | OUTPATIENT
Start: 2019-02-14 | End: 2020-02-17

## 2019-02-14 RX ORDER — TAMSULOSIN HYDROCHLORIDE 0.4 MG/1
0.4 CAPSULE ORAL NIGHTLY
Qty: 90 CAPSULE | Refills: 3 | Status: SHIPPED | OUTPATIENT
Start: 2019-02-14 | End: 2020-03-12

## 2019-02-14 RX ORDER — PANTOPRAZOLE SODIUM 40 MG/1
40 TABLET, DELAYED RELEASE ORAL DAILY
Qty: 90 TABLET | Refills: 3 | Status: SHIPPED | OUTPATIENT
Start: 2019-02-14 | End: 2020-02-10

## 2019-02-14 NOTE — TELEPHONE ENCOUNTER
I have not received any notes on upcoming procedure unless you can find then in Epic. It may be wise for me to see him with his current appointment date.

## 2019-02-14 NOTE — TELEPHONE ENCOUNTER
Please call Laith Cordova, pt spouse   Pt is scheduled to see Dr Romina Lechuga on 3/4/19  Pt is not scheduled for a procedure on 2/26/19, should pt keep appt for 3/4/19? Laith Cordova also asking if Dr Romina Lechuga rec'd documents related to procedure on 3/4/19?   Tasked to nursing

## 2019-02-14 NOTE — TELEPHONE ENCOUNTER
To Dr. Megha Hutson - see below, pended meds - OK to refill? Look like you prescribed tamsulosin 2/4/19, #30, 11RF.

## 2019-02-14 NOTE — TELEPHONE ENCOUNTER
As Christi PECK called patient , spoke with spouse per hipaa who states  will get stent placement end of february and papers shoul mayelae faxed over today

## 2019-02-14 NOTE — TELEPHONE ENCOUNTER
Wife is calling for refills  of     Pantoprazole  Clopidogrel &   Tamsulosin - Anoop in [facility] Dr Ronnie Newby would take over refills    Does not need a refill on Finasteride but questions if he should be taking both Tamsulosin & Finasteride

## 2019-02-14 NOTE — TELEPHONE ENCOUNTER
Yes.  Okay to refill all 3 medications as requested. Pantoprazole. Tamsulosin. Clopidogrel. As needed refills.

## 2019-02-15 ENCOUNTER — TELEPHONE (OUTPATIENT)
Dept: INTERNAL MEDICINE CLINIC | Facility: CLINIC | Age: 84
End: 2019-02-15

## 2019-02-15 NOTE — TELEPHONE ENCOUNTER
Please fax my last office visit note along with surgical clearance from me to [facility] in regards to patient's left trans-carotid artery revascularization procedure for February 26, 2019. Be done at [address]. In out box.

## 2019-02-18 ENCOUNTER — PRIOR ORIGINAL RECORDS (OUTPATIENT)
Dept: OTHER | Age: 84
End: 2019-02-18

## 2019-02-18 ENCOUNTER — TELEPHONE (OUTPATIENT)
Dept: INTERNAL MEDICINE CLINIC | Facility: CLINIC | Age: 84
End: 2019-02-18

## 2019-02-18 NOTE — TELEPHONE ENCOUNTER
Dr. Ronnie Newby, please advise. It looks like we may have already spoken to patient regarding lab results, including lipids, as noted in telephone encounter on 2/5/19. Ok to send copy of lipids and other results from 2/4/19 to patient?

## 2019-02-18 NOTE — TELEPHONE ENCOUNTER
I spoke with patient and relayed Dr. Starla Patel message. Patient says she has requested this from the hospital a few times. She wants Dr. Ashley Palma to have a copy of the CT Abdomen from 2/10/19. Dr. Ramirez Dears, can you view this under Care Everywhere?  There is a si

## 2019-02-18 NOTE — TELEPHONE ENCOUNTER
Patient's wife called back. I relayed to her Dr Quintin Martinez message.  The reason she is asking about the lipids is because patient is having stent put in and alexian brothers wanted to increase his statin medication, but he saw Dr Yuridia Christie today and he does no

## 2019-02-19 LAB
ALBUMIN: 3.7 G/DL
ALT (SGPT): 17 U/L
AST (SGOT): 18 U/L
BILIRUBIN TOTAL: 0.9 MG/DL
BUN: 10 MG/DL
BUN: 12 MG/DL
CALCIUM: 9.6 MG/DL
CALCIUM: 9.8 MG/DL
CHLORIDE: 105 MEQ/L
CHLORIDE: 105 MEQ/L
CHOLESTEROL, TOTAL: 112 MG/DL
CREATININE, SERUM: 0.84 MG/DL
CREATININE, SERUM: 0.92 MG/DL
GLOBULIN: 3.5 G/DL
GLUCOSE: 86 MG/DL
GLUCOSE: 92 MG/DL
GLUCOSE: 92 MG/DL
HDL CHOLESTEROL: 35 MG/DL
HEMATOCRIT: 41.8 %
HEMATOCRIT: 42 %
HEMOGLOBIN: 13.3 G/DL
HEMOGLOBIN: 13.7 G/DL
LDL CHOLESTEROL: 64 MG/DL
MCH: 29.6 PG
MCHC: 31.8 G/DL
MCV: 92.9 FL
PLATELETS: 210 K/UL
PLATELETS: 226 K/UL
POTASSIUM, SERUM: 4 MEQ/L
POTASSIUM, SERUM: 4.3 MEQ/L
PROTEIN, TOTAL: 7.2 G/DL
RED BLOOD COUNT: 4.5 X 10-6/U
RED BLOOD COUNT: 4.67 X 10-6/U
SGOT (AST): 18 IU/L
SGPT (ALT): 17 IU/L
SODIUM: 140 MEQ/L
SODIUM: 141 MEQ/L
THYROID STIMULATING HORMONE: 1.63 MLU/L
TRIGLYCERIDES: 67 MG/DL
WHITE BLOOD COUNT: 6 X 10-3/U
WHITE BLOOD COUNT: 6.3 X 10-3/U

## 2019-02-19 NOTE — TELEPHONE ENCOUNTER
Spouse calling to report imaging done at [facility]. 2/5/19 MRI brain  2/7/19 CT head/neck  2/10/19 CT abd. She will call elo mcduffie tomorrow and request re-fax of all imaging to our office. Our fax number provided.

## 2019-02-22 ENCOUNTER — PRIOR ORIGINAL RECORDS (OUTPATIENT)
Dept: OTHER | Age: 84
End: 2019-02-22

## 2019-02-28 VITALS
DIASTOLIC BLOOD PRESSURE: 68 MMHG | BODY MASS INDEX: 29.49 KG/M2 | HEIGHT: 70 IN | WEIGHT: 206 LBS | HEART RATE: 70 BPM | SYSTOLIC BLOOD PRESSURE: 132 MMHG

## 2019-02-28 VITALS
BODY MASS INDEX: 28.92 KG/M2 | WEIGHT: 202 LBS | HEIGHT: 70 IN | DIASTOLIC BLOOD PRESSURE: 68 MMHG | SYSTOLIC BLOOD PRESSURE: 122 MMHG | HEART RATE: 82 BPM

## 2019-02-28 VITALS
HEIGHT: 70 IN | HEART RATE: 79 BPM | SYSTOLIC BLOOD PRESSURE: 118 MMHG | OXYGEN SATURATION: 97 % | BODY MASS INDEX: 27.49 KG/M2 | WEIGHT: 192 LBS | DIASTOLIC BLOOD PRESSURE: 60 MMHG

## 2019-03-04 ENCOUNTER — TELEPHONE (OUTPATIENT)
Dept: INTERNAL MEDICINE CLINIC | Facility: CLINIC | Age: 84
End: 2019-03-04

## 2019-03-04 NOTE — TELEPHONE ENCOUNTER
Discussed with wife. He had to go to ER for some incisional bleeding. He scratched the area. He is at home now. Will see me in future. Had carotid stent left side that seemed to go well. Course complicated by dementia. His surgeon will be notified.   Sarah

## 2019-03-14 ENCOUNTER — OFFICE VISIT (OUTPATIENT)
Dept: INTERNAL MEDICINE CLINIC | Facility: CLINIC | Age: 84
End: 2019-03-14
Payer: MEDICARE

## 2019-03-14 ENCOUNTER — TELEPHONE (OUTPATIENT)
Dept: INTERNAL MEDICINE CLINIC | Facility: CLINIC | Age: 84
End: 2019-03-14

## 2019-03-14 VITALS
BODY MASS INDEX: 28.2 KG/M2 | HEIGHT: 70 IN | DIASTOLIC BLOOD PRESSURE: 70 MMHG | WEIGHT: 197 LBS | TEMPERATURE: 98 F | SYSTOLIC BLOOD PRESSURE: 114 MMHG | OXYGEN SATURATION: 98 % | HEART RATE: 85 BPM

## 2019-03-14 DIAGNOSIS — Z98.890 HISTORY OF LEFT COMMON CAROTID ARTERY STENT PLACEMENT: Primary | ICD-10-CM

## 2019-03-14 DIAGNOSIS — I25.10 CORONARY ARTERY DISEASE INVOLVING NATIVE CORONARY ARTERY OF NATIVE HEART WITHOUT ANGINA PECTORIS: ICD-10-CM

## 2019-03-14 DIAGNOSIS — E78.5 HYPERLIPIDEMIA, UNSPECIFIED HYPERLIPIDEMIA TYPE: ICD-10-CM

## 2019-03-14 DIAGNOSIS — Z95.828 HISTORY OF LEFT COMMON CAROTID ARTERY STENT PLACEMENT: Primary | ICD-10-CM

## 2019-03-14 DIAGNOSIS — I63.9 CEREBROVASCULAR ACCIDENT (CVA), UNSPECIFIED MECHANISM (HCC): ICD-10-CM

## 2019-03-14 DIAGNOSIS — I10 ESSENTIAL HYPERTENSION: ICD-10-CM

## 2019-03-14 PROCEDURE — 99214 OFFICE O/P EST MOD 30 MIN: CPT | Performed by: INTERNAL MEDICINE

## 2019-03-14 PROCEDURE — 1111F DSCHRG MED/CURRENT MED MERGE: CPT | Performed by: INTERNAL MEDICINE

## 2019-03-14 PROCEDURE — G0463 HOSPITAL OUTPT CLINIC VISIT: HCPCS | Performed by: INTERNAL MEDICINE

## 2019-03-14 RX ORDER — FINASTERIDE 5 MG/1
TABLET, FILM COATED ORAL
COMMUNITY
Start: 2019-02-04 | End: 2020-07-20

## 2019-03-14 NOTE — TELEPHONE ENCOUNTER
Please ask Dr. Nicho Samson office for operative report on his left carotid stent. Phone number [phone]. Left contact information in out box.

## 2019-03-14 NOTE — PROGRESS NOTES
Sukumar Mcmahon is a 80year old male   HPI:   Pt.presents for the following problems. Patient doing very well after having a recent [facility]. He is on aspirin and Plavix.   I discussed with him a daily. Disp: 90 tablet Rfl: 3   tamsulosin HCl 0.4 MG Oral Cap Take 1 capsule (0.4 mg total) by mouth nightly. Disp: 90 capsule Rfl: 3   Polyethylene Glycol 3350 Oral Powd Pack Take 17 g by mouth daily.  Disp:  Rfl:    Cholecalciferol (VITAMIN D3) 1000 unit with mesh   • HERNIA SURGERY  4/7/2016    robotic repair of paraesophageal hernia with biologic mesh/Nissen fundoplication   • HIP REPLACEMENT SURGERY Right 2012   • INCISION AND DRAINAGE Left 10/10/2017    Performed by Le Ramirez MD at [facility]   • erythema. EYES;  PERRL. conjunctiva are clear  NECK:  Supple. no adenopathy,  thyroid normal,  LUNGS:  clear to auscultation. effort normal  CARDIO:  RRR without murmur. S1 normal S2 normal.   No carotid bruits. Carotid pulses 1+ B/L.   Incision right a

## 2019-03-14 NOTE — TELEPHONE ENCOUNTER
Called number given and directed to call [phone] with Montez Nunez who transferred call to medical records. Spoke with Sincere Moreau who will fax over report - to DR. PECK

## 2019-03-27 ENCOUNTER — TELEPHONE (OUTPATIENT)
Dept: INTERNAL MEDICINE CLINIC | Facility: CLINIC | Age: 84
End: 2019-03-27

## 2019-03-27 NOTE — TELEPHONE ENCOUNTER
Pharmacist asking for refill for:      Metropolol 50mg - 1 tab twice a day - #180 (was from a former doctor)

## 2019-03-28 RX ORDER — METOPROLOL TARTRATE 50 MG/1
TABLET, FILM COATED ORAL
Qty: 180 TABLET | Refills: 3 | Status: SHIPPED | OUTPATIENT
Start: 2019-03-28 | End: 2020-03-24

## 2019-04-05 RX ORDER — CLOPIDOGREL BISULFATE 75 MG/1
TABLET ORAL
COMMUNITY
Start: 2019-02-18

## 2019-04-05 RX ORDER — METOPROLOL TARTRATE 50 MG/1
TABLET, FILM COATED ORAL
COMMUNITY
Start: 2015-11-16

## 2019-04-05 RX ORDER — FINASTERIDE 5 MG/1
TABLET, FILM COATED ORAL
COMMUNITY
Start: 2019-02-18

## 2019-04-05 RX ORDER — ASPIRIN 81 MG
TABLET, DELAYED RELEASE (ENTERIC COATED) ORAL
COMMUNITY

## 2019-04-05 RX ORDER — TAMSULOSIN HYDROCHLORIDE 0.4 MG/1
CAPSULE ORAL
COMMUNITY

## 2019-04-05 RX ORDER — ATORVASTATIN CALCIUM 40 MG/1
TABLET, FILM COATED ORAL
COMMUNITY
Start: 2017-12-20 | End: 2020-04-28 | Stop reason: SDUPTHER

## 2019-04-16 ENCOUNTER — TELEPHONE (OUTPATIENT)
Dept: INTERNAL MEDICINE CLINIC | Facility: CLINIC | Age: 84
End: 2019-04-16

## 2019-04-16 ENCOUNTER — OFFICE VISIT (OUTPATIENT)
Dept: INTERNAL MEDICINE CLINIC | Facility: CLINIC | Age: 84
End: 2019-04-16
Payer: MEDICARE

## 2019-04-16 VITALS
WEIGHT: 202 LBS | OXYGEN SATURATION: 99 % | BODY MASS INDEX: 28.92 KG/M2 | HEART RATE: 66 BPM | TEMPERATURE: 98 F | DIASTOLIC BLOOD PRESSURE: 68 MMHG | SYSTOLIC BLOOD PRESSURE: 130 MMHG | HEIGHT: 70 IN

## 2019-04-16 DIAGNOSIS — I63.9 CEREBROVASCULAR ACCIDENT (CVA), UNSPECIFIED MECHANISM (HCC): ICD-10-CM

## 2019-04-16 DIAGNOSIS — Z95.828 HISTORY OF LEFT COMMON CAROTID ARTERY STENT PLACEMENT: Primary | ICD-10-CM

## 2019-04-16 DIAGNOSIS — I25.10 CORONARY ARTERY DISEASE INVOLVING NATIVE CORONARY ARTERY OF NATIVE HEART WITHOUT ANGINA PECTORIS: ICD-10-CM

## 2019-04-16 DIAGNOSIS — I10 ESSENTIAL HYPERTENSION: ICD-10-CM

## 2019-04-16 DIAGNOSIS — E78.5 HYPERLIPIDEMIA, UNSPECIFIED HYPERLIPIDEMIA TYPE: ICD-10-CM

## 2019-04-16 DIAGNOSIS — Z98.890 HISTORY OF LEFT COMMON CAROTID ARTERY STENT PLACEMENT: Primary | ICD-10-CM

## 2019-04-16 PROCEDURE — G0463 HOSPITAL OUTPT CLINIC VISIT: HCPCS | Performed by: INTERNAL MEDICINE

## 2019-04-16 PROCEDURE — 99214 OFFICE O/P EST MOD 30 MIN: CPT | Performed by: INTERNAL MEDICINE

## 2019-04-16 NOTE — PROGRESS NOTES
Jenifer España is a 80year old male   HPI:   Pt.presents for the following problems. Patient doing fairly well. He did discuss and wife discussed that he still feels some right leg weakness.   He is trying to do exercises at home but with the wraps that is 28.98 kg/m².        Current Outpatient Medications:  Metoprolol Tartrate 50 MG Oral Tab TAKE 1 TABLET BY MOUTH TWICE A DAY WITH MEALS Disp: 180 tablet Rfl: 3   finasteride 5 MG Oral Tab  Disp:  Rfl:    docusate sodium 50 MG Oral Cap Take 50 mg by mouth 2 12/13/2018   • Unspecified essential hypertension       Past Surgical History:   Procedure Laterality Date   • ANKLE FRACTURE SURGERY      plate in place   • APPENDECTOMY     • CABG  1994   • [facility]    laparoscopic cholecystectomy dr. Saranya Thompson apparent distress  SKIN:  no rashes, no suspicious lesions in areas examined. HEENT:  atraumatic,  Pharynx without exudate or erythema. EYES;  PERRL. conjunctiva are clear  NECK:  Supple. no adenopathy,    LUNGS:  clear to auscultation.  effort normal  C Sooner if needed. Wife will discuss with urology the need for cystoscope.     Juvenal Brown MD  4/16/2019  12:12 PM

## 2019-05-09 ENCOUNTER — LAB ENCOUNTER (OUTPATIENT)
Dept: LAB | Facility: HOSPITAL | Age: 84
End: 2019-05-09
Attending: INTERNAL MEDICINE
Payer: MEDICARE

## 2019-05-09 ENCOUNTER — CLINICAL ABSTRACT (OUTPATIENT)
Dept: CARDIOLOGY | Age: 84
End: 2019-05-09

## 2019-05-09 DIAGNOSIS — I25.10 CORONARY ATHEROSCLEROSIS OF NATIVE CORONARY ARTERY: Primary | ICD-10-CM

## 2019-05-09 LAB
ALT SERPL-CCNC: 25 U/L
ANION GAP SERPL CALC-SCNC: NORMAL MMOL/L
AST SERPL-CCNC: 14 U/L
BUN SERPL-MCNC: 14 MG/DL
BUN/CREAT SERPL: NORMAL
CALCIUM SERPL-MCNC: 9.5 MG/DL
CHLORIDE SERPL-SCNC: 109 MMOL/L
CO2 SERPL-SCNC: NORMAL MMOL/L
CREAT SERPL-MCNC: 0.83 MG/DL
GLUCOSE SERPL-MCNC: 87 MG/DL
LENGTH OF FAST TIME PATIENT: NORMAL H
POTASSIUM SERPL-SCNC: 3.8 MMOL/L
SODIUM SERPL-SCNC: 141 MMOL/L

## 2019-05-09 PROCEDURE — 84450 TRANSFERASE (AST) (SGOT): CPT

## 2019-05-09 PROCEDURE — 80048 BASIC METABOLIC PNL TOTAL CA: CPT

## 2019-05-09 PROCEDURE — 84460 ALANINE AMINO (ALT) (SGPT): CPT

## 2019-05-09 PROCEDURE — 36415 COLL VENOUS BLD VENIPUNCTURE: CPT

## 2019-06-14 ENCOUNTER — TELEPHONE (OUTPATIENT)
Dept: CARDIOLOGY | Age: 84
End: 2019-06-14

## 2019-06-17 ENCOUNTER — TELEPHONE (OUTPATIENT)
Dept: INTERNAL MEDICINE CLINIC | Facility: CLINIC | Age: 84
End: 2019-06-17

## 2019-06-17 ENCOUNTER — OFFICE VISIT (OUTPATIENT)
Dept: INTERNAL MEDICINE CLINIC | Facility: CLINIC | Age: 84
End: 2019-06-17
Payer: MEDICARE

## 2019-06-17 VITALS
WEIGHT: 203 LBS | SYSTOLIC BLOOD PRESSURE: 124 MMHG | DIASTOLIC BLOOD PRESSURE: 68 MMHG | BODY MASS INDEX: 29 KG/M2 | TEMPERATURE: 97 F | HEART RATE: 64 BPM | OXYGEN SATURATION: 97 % | RESPIRATION RATE: 20 BRPM

## 2019-06-17 DIAGNOSIS — Z98.890 HISTORY OF LEFT COMMON CAROTID ARTERY STENT PLACEMENT: Primary | ICD-10-CM

## 2019-06-17 DIAGNOSIS — G81.91 RIGHT HEMIPARESIS (HCC): ICD-10-CM

## 2019-06-17 DIAGNOSIS — Z95.828 HISTORY OF LEFT COMMON CAROTID ARTERY STENT PLACEMENT: Primary | ICD-10-CM

## 2019-06-17 DIAGNOSIS — R53.81 PHYSICAL DECONDITIONING: ICD-10-CM

## 2019-06-17 DIAGNOSIS — R26.9 GAIT DISORDER: ICD-10-CM

## 2019-06-17 DIAGNOSIS — E78.5 HYPERLIPIDEMIA, UNSPECIFIED HYPERLIPIDEMIA TYPE: ICD-10-CM

## 2019-06-17 DIAGNOSIS — I63.9 CEREBROVASCULAR ACCIDENT (CVA), UNSPECIFIED MECHANISM (HCC): ICD-10-CM

## 2019-06-17 DIAGNOSIS — I10 ESSENTIAL HYPERTENSION: ICD-10-CM

## 2019-06-17 DIAGNOSIS — R53.83 FATIGUE, UNSPECIFIED TYPE: ICD-10-CM

## 2019-06-17 DIAGNOSIS — R26.89 BALANCE DISORDER: Primary | ICD-10-CM

## 2019-06-17 DIAGNOSIS — Z86.73 HISTORY OF CVA IN ADULTHOOD: ICD-10-CM

## 2019-06-17 DIAGNOSIS — I25.10 CORONARY ARTERY DISEASE INVOLVING NATIVE CORONARY ARTERY OF NATIVE HEART WITHOUT ANGINA PECTORIS: ICD-10-CM

## 2019-06-17 PROCEDURE — 99214 OFFICE O/P EST MOD 30 MIN: CPT | Performed by: INTERNAL MEDICINE

## 2019-06-17 PROCEDURE — G0463 HOSPITAL OUTPT CLINIC VISIT: HCPCS | Performed by: INTERNAL MEDICINE

## 2019-06-17 NOTE — TELEPHONE ENCOUNTER
Please call Dr.Tejas Isaac's at [phone]. Please ask if we could get patient's postoperative carotid Doppler scans after he left hospital for our records. Also any other neck scans that he may have had. This just to complete our record.

## 2019-06-17 NOTE — TELEPHONE ENCOUNTER
Spoke with medical records at Dr Mejia Calhoun office. They will fax over the march 28th carotid doppler and CTA from February.     MARIA ANTONIAI Dr Grayce Eisenmenger

## 2019-06-17 NOTE — TELEPHONE ENCOUNTER
Patient wife Hakeem Sullivan calling Dr. Austin Brock back with phone number he asked for.     Homer Odin [phone]

## 2019-06-17 NOTE — PROGRESS NOTES
Ernestina Garcia is a 80year old male   HPI:   Pt.presents for the following problems. Patient says he is doing well. Wife confirms this. He still uses a cane but many times he does not actually need it.   He had a carotid artery stent placed by  tablet Rfl: 3   Clopidogrel Bisulfate 75 MG Oral Tab Take 1 tablet (75 mg total) by mouth daily. Disp: 90 tablet Rfl: 3   tamsulosin HCl 0.4 MG Oral Cap Take 1 capsule (0.4 mg total) by mouth nightly.  Disp: 90 capsule Rfl: 3   Polyethylene Glycol 3350 Oral paraesophageal hernia with biologic mesh/Nissen fundoplication   • HIP REPLACEMENT SURGERY Right 2012   • INCISION AND DRAINAGE Left 10/10/2017    Performed by Flor Montano MD at [address] [place] [address] MAIN OR   • OTHER SURGICAL HISTORY  4/7/16    robotic repair of incarce tenderness  EXTREMITIES:  no cyanosis, clubbing or edema. NEURO:  Awake and aware. Motor strength equal arms and legs. I do not appreciate any focal extremity weakness. She ambulates slowly but steadily with a cane.   I believe he could benefit from ou

## 2019-06-17 NOTE — TELEPHONE ENCOUNTER
Fax#[phone]    PT orders faxed; fax confirmation received. Spoke to pt's wife (ok per HIPAA) and advised orders placed and faxed; wife verbalized understanding.

## 2019-06-27 PROBLEM — I35.1 AORTIC VALVE INSUFFICIENCY: Status: ACTIVE | Noted: 2019-06-27

## 2019-06-27 PROBLEM — I25.10 CORONARY ATHEROSCLEROSIS OF NATIVE CORONARY ARTERY: Status: ACTIVE | Noted: 2019-06-27

## 2019-06-27 PROBLEM — E78.00 PURE HYPERCHOLESTEROLEMIA: Status: ACTIVE | Noted: 2019-06-27

## 2019-06-27 RX ORDER — LANOLIN ALCOHOL/MO/W.PET/CERES
1000 CREAM (GRAM) TOPICAL DAILY
COMMUNITY

## 2019-06-27 RX ORDER — LISINOPRIL 20 MG/1
20 TABLET ORAL DAILY
COMMUNITY

## 2019-06-27 RX ORDER — PANTOPRAZOLE SODIUM 40 MG/1
40 TABLET, DELAYED RELEASE ORAL DAILY
COMMUNITY
Start: 2019-02-14

## 2019-07-01 ENCOUNTER — OFFICE VISIT (OUTPATIENT)
Dept: CARDIOLOGY | Age: 84
End: 2019-07-01

## 2019-07-01 VITALS
DIASTOLIC BLOOD PRESSURE: 70 MMHG | SYSTOLIC BLOOD PRESSURE: 126 MMHG | HEART RATE: 90 BPM | HEIGHT: 70 IN | WEIGHT: 205 LBS | BODY MASS INDEX: 29.35 KG/M2

## 2019-07-01 DIAGNOSIS — I35.1 NONRHEUMATIC AORTIC VALVE INSUFFICIENCY: ICD-10-CM

## 2019-07-01 DIAGNOSIS — I25.10 ATHEROSCLEROSIS OF NATIVE CORONARY ARTERY OF NATIVE HEART WITHOUT ANGINA PECTORIS: ICD-10-CM

## 2019-07-01 DIAGNOSIS — E78.00 PURE HYPERCHOLESTEROLEMIA: Primary | ICD-10-CM

## 2019-07-01 PROCEDURE — 99214 OFFICE O/P EST MOD 30 MIN: CPT | Performed by: INTERNAL MEDICINE

## 2019-07-01 SDOH — HEALTH STABILITY: MENTAL HEALTH: HOW OFTEN DO YOU HAVE A DRINK CONTAINING ALCOHOL?: NEVER

## 2019-07-09 ENCOUNTER — TELEPHONE (OUTPATIENT)
Dept: INTERNAL MEDICINE CLINIC | Facility: CLINIC | Age: 84
End: 2019-07-09

## 2019-07-09 DIAGNOSIS — E04.2 MULTIPLE THYROID NODULES: Primary | ICD-10-CM

## 2019-07-09 NOTE — TELEPHONE ENCOUNTER
Please call wife and let her know that after last visit I had meant to bring up the idea of getting an updated thyroid ultrasound. Back in 2017 he had a biopsy of a thyroid nodule. This came out benign.   He then had a follow-up thyroid ultrasound June 20

## 2019-07-10 NOTE — TELEPHONE ENCOUNTER
Message relayed to wife Leroy Jacques who verbalized understanding and expressed agreement with this plan. Will call to schedule. Nothing further at this time.

## 2019-07-10 NOTE — TELEPHONE ENCOUNTER
I tried looking in medical records for fax but Princeton Shelia has left for the day and I did not find it. I called [facility] at [phone] and requested lab results.  Awaiting fax Pt is in room playing  No signs of distress  Will continue to monitor       Ramy Montgomery  07/10/19 9033

## 2019-07-18 ENCOUNTER — HOSPITAL ENCOUNTER (OUTPATIENT)
Dept: ULTRASOUND IMAGING | Facility: HOSPITAL | Age: 84
Discharge: HOME | End: 2019-07-18
Attending: INTERNAL MEDICINE
Payer: MEDICARE

## 2019-07-18 DIAGNOSIS — E04.2 MULTIPLE THYROID NODULES: ICD-10-CM

## 2019-07-18 PROCEDURE — 76536 US EXAM OF HEAD AND NECK: CPT | Performed by: INTERNAL MEDICINE

## 2019-07-19 ENCOUNTER — TELEPHONE (OUTPATIENT)
Dept: INTERNAL MEDICINE CLINIC | Facility: CLINIC | Age: 84
End: 2019-07-19

## 2019-07-19 NOTE — TELEPHONE ENCOUNTER
Called patients wife with Dr. Angelo Baer message. She will call Dr. Arsalan Downing office and schedule appointment.

## 2019-07-19 NOTE — TELEPHONE ENCOUNTER
Please call wife and let her know that patient's thyroid ultrasound is stable with his nodules looking to be fairly the same size as before.   Some nodules however are borderline and the radiologist notes consideration for biopsy but I am thinking that thes

## 2019-07-22 ENCOUNTER — OFFICE VISIT (OUTPATIENT)
Dept: OTOLARYNGOLOGY | Facility: CLINIC | Age: 84
End: 2019-07-22
Payer: MEDICARE

## 2019-07-22 VITALS
WEIGHT: 203 LBS | TEMPERATURE: 99 F | DIASTOLIC BLOOD PRESSURE: 82 MMHG | SYSTOLIC BLOOD PRESSURE: 148 MMHG | BODY MASS INDEX: 29.06 KG/M2 | HEIGHT: 70 IN

## 2019-07-22 DIAGNOSIS — E04.2 MULTIPLE THYROID NODULES: Primary | ICD-10-CM

## 2019-07-22 PROCEDURE — G0463 HOSPITAL OUTPT CLINIC VISIT: HCPCS | Performed by: OTOLARYNGOLOGY

## 2019-07-22 PROCEDURE — 99213 OFFICE O/P EST LOW 20 MIN: CPT | Performed by: OTOLARYNGOLOGY

## 2019-07-23 NOTE — PROGRESS NOTES
Viviana Shaw is a 80year old male. Patient presents with:  Thyroid Problem: pt had a Ultrasound done on 7/18/19, referred by Dr. Ashley Palma    HPI:   He is in follow-up of a thyroid ultrasound.   Ultrasound demonstrates multiple nodules with nodules in each th • High blood pressure    • High cholesterol    • Internal hemorrhoids    • Malignant tumor of neck (HCC)    • Moderate aortic insufficiency    • Other and unspecified hyperlipidemia    • Plantar fasciitis, left 05-    readjust insole, add more guzman Left: Normal.    Ears Normal Inspection - Right: Normal, Left: Normal. Canal - Left: Normal. TM - Right: Normal, Left: Normal.     ASSESSMENT AND PLAN:   1. Multiple thyroid nodules  Is small nodules present in both lobes of his thyroid.   These really have

## 2019-08-12 ENCOUNTER — LAB ENCOUNTER (OUTPATIENT)
Dept: LAB | Age: 84
End: 2019-08-12
Attending: INTERNAL MEDICINE
Payer: MEDICARE

## 2019-08-12 ENCOUNTER — HOSPITAL ENCOUNTER (OUTPATIENT)
Dept: ULTRASOUND IMAGING | Facility: HOSPITAL | Age: 84
Discharge: HOME | End: 2019-08-12
Attending: INTERNAL MEDICINE
Payer: MEDICARE

## 2019-08-12 ENCOUNTER — OFFICE VISIT (OUTPATIENT)
Dept: INTERNAL MEDICINE CLINIC | Facility: CLINIC | Age: 84
End: 2019-08-12
Payer: MEDICARE

## 2019-08-12 VITALS
WEIGHT: 212 LBS | OXYGEN SATURATION: 95 % | SYSTOLIC BLOOD PRESSURE: 126 MMHG | HEART RATE: 85 BPM | RESPIRATION RATE: 22 BRPM | DIASTOLIC BLOOD PRESSURE: 72 MMHG | BODY MASS INDEX: 30 KG/M2 | TEMPERATURE: 98 F

## 2019-08-12 DIAGNOSIS — I10 HYPERTENSION, BENIGN: ICD-10-CM

## 2019-08-12 DIAGNOSIS — R22.43 LOCALIZED SWELLING OF BOTH LOWER LEGS: ICD-10-CM

## 2019-08-12 DIAGNOSIS — I63.512 CEREBROVASCULAR ACCIDENT (CVA) DUE TO OCCLUSION OF LEFT MIDDLE CEREBRAL ARTERY (HCC): ICD-10-CM

## 2019-08-12 DIAGNOSIS — R22.43 LOCALIZED SWELLING OF BOTH LOWER LEGS: Primary | ICD-10-CM

## 2019-08-12 LAB
ALBUMIN SERPL-MCNC: 3.9 G/DL (ref 3.4–5)
ALBUMIN/GLOB SERPL: 1.1 {RATIO} (ref 1–2)
ALP LIVER SERPL-CCNC: 108 U/L (ref 45–117)
ALT SERPL-CCNC: 24 U/L (ref 16–61)
ANION GAP SERPL CALC-SCNC: 5 MMOL/L (ref 0–18)
AST SERPL-CCNC: 16 U/L (ref 15–37)
BASOPHILS # BLD AUTO: 0.04 X10(3) UL (ref 0–0.2)
BASOPHILS NFR BLD AUTO: 0.5 %
BILIRUB SERPL-MCNC: 0.5 MG/DL (ref 0.1–2)
BUN BLD-MCNC: 14 MG/DL (ref 7–18)
BUN/CREAT SERPL: 14.6 (ref 10–20)
CALCIUM BLD-MCNC: 9.7 MG/DL (ref 8.5–10.1)
CHLORIDE SERPL-SCNC: 107 MMOL/L (ref 98–112)
CO2 SERPL-SCNC: 31 MMOL/L (ref 21–32)
CREAT BLD-MCNC: 0.96 MG/DL (ref 0.7–1.3)
DEPRECATED RDW RBC AUTO: 49.8 FL (ref 35.1–46.3)
EOSINOPHIL # BLD AUTO: 0.08 X10(3) UL (ref 0–0.7)
EOSINOPHIL NFR BLD AUTO: 1.1 %
ERYTHROCYTE [DISTWIDTH] IN BLOOD BY AUTOMATED COUNT: 15.4 % (ref 11–15)
GLOBULIN PLAS-MCNC: 3.5 G/DL (ref 2.8–4.4)
GLUCOSE BLD-MCNC: 116 MG/DL (ref 70–99)
HCT VFR BLD AUTO: 42.3 % (ref 39–53)
HGB BLD-MCNC: 13.7 G/DL (ref 13–17.5)
IMM GRANULOCYTES # BLD AUTO: 0.03 X10(3) UL (ref 0–1)
IMM GRANULOCYTES NFR BLD: 0.4 %
LYMPHOCYTES # BLD AUTO: 1.25 X10(3) UL (ref 1–4)
LYMPHOCYTES NFR BLD AUTO: 17.1 %
M PROTEIN MFR SERPL ELPH: 7.4 G/DL (ref 6.4–8.2)
MCH RBC QN AUTO: 28.7 PG (ref 26–34)
MCHC RBC AUTO-ENTMCNC: 32.4 G/DL (ref 31–37)
MCV RBC AUTO: 88.7 FL (ref 80–100)
MONOCYTES # BLD AUTO: 0.66 X10(3) UL (ref 0.1–1)
MONOCYTES NFR BLD AUTO: 9.1 %
NEUTROPHILS # BLD AUTO: 5.23 X10 (3) UL (ref 1.5–7.7)
NEUTROPHILS # BLD AUTO: 5.23 X10(3) UL (ref 1.5–7.7)
NEUTROPHILS NFR BLD AUTO: 71.8 %
OSMOLALITY SERPL CALC.SUM OF ELEC: 297 MOSM/KG (ref 275–295)
PATIENT FASTING: NO
PLATELET # BLD AUTO: 207 10(3)UL (ref 150–450)
POTASSIUM SERPL-SCNC: 3.9 MMOL/L (ref 3.5–5.1)
RBC # BLD AUTO: 4.77 X10(6)UL (ref 3.8–5.8)
SODIUM SERPL-SCNC: 143 MMOL/L (ref 136–145)
TSI SER-ACNC: 2.24 MIU/ML (ref 0.36–3.74)
WBC # BLD AUTO: 7.3 X10(3) UL (ref 4–11)

## 2019-08-12 PROCEDURE — 93970 EXTREMITY STUDY: CPT | Performed by: INTERNAL MEDICINE

## 2019-08-12 PROCEDURE — 36415 COLL VENOUS BLD VENIPUNCTURE: CPT

## 2019-08-12 PROCEDURE — G0463 HOSPITAL OUTPT CLINIC VISIT: HCPCS | Performed by: INTERNAL MEDICINE

## 2019-08-12 PROCEDURE — 99214 OFFICE O/P EST MOD 30 MIN: CPT | Performed by: INTERNAL MEDICINE

## 2019-08-12 PROCEDURE — 80053 COMPREHEN METABOLIC PANEL: CPT

## 2019-08-12 PROCEDURE — 84443 ASSAY THYROID STIM HORMONE: CPT

## 2019-08-12 PROCEDURE — 85025 COMPLETE CBC W/AUTO DIFF WBC: CPT

## 2019-08-12 RX ORDER — FUROSEMIDE 20 MG/1
20 TABLET ORAL DAILY
Qty: 30 TABLET | Refills: 3 | Status: SHIPPED | OUTPATIENT
Start: 2019-08-12 | End: 2019-12-04

## 2019-08-12 RX ORDER — POTASSIUM CHLORIDE 750 MG/1
10 TABLET, FILM COATED, EXTENDED RELEASE ORAL DAILY
Qty: 30 TABLET | Refills: 3 | Status: SHIPPED | OUTPATIENT
Start: 2019-08-12 | End: 2019-12-04

## 2019-08-12 NOTE — PROGRESS NOTES
Eliana Nieves is a 80year old male. HPI:   He has had painless swelling ot both lower legs over the last week or so. No chest pain or sob. He has gained 10 lbs since last visit here 7/22 and 6/17/    Otherwise doing OK - good appetite, energy poor.       [facility] Manual 04-    xray/readjust insole   • Coronary atherosclerosis    • Diverticulosis    • Esophageal reflux    • Hallux rigidus of left foot [id number]    xray/readjust insole   • Hearing impairment     bilateral hearing aids   • Hepatic cyst    • Hiatal h does have nonocclusive clot in the left profunda vein. Lasix 20 mg, KCL 10 meq.      No apparent etiology to fluid retention but he is not in clinical HF.     2. Cerebrovascular accident (CVA) due to occlusion of left middle cerebral artery (HCC)  Good s

## 2019-08-14 ENCOUNTER — TELEPHONE (OUTPATIENT)
Dept: INTERNAL MEDICINE CLINIC | Facility: CLINIC | Age: 84
End: 2019-08-14

## 2019-08-23 NOTE — LETTER
Hospital Discharge Documentation    Please phone to schedule a hospital follow up appointment. Pt d/c home with [facility] and IV abx.      From: [address] Hospitalist's Office  Phone: [phone]    Patient discharged time/date: 10/13/20 Oropharyngeal dysphagia     Presbyesophagus     Multiple thyroid nodules     Abdominal wall mass of left lower quadrant     Incarcerated left inguinal hernia     Inguinal abscess      Reason for Admission:[FG.1]   Patient Active Problem List:     Aortic Inguinal abscess  S/P Incision and drainage left groin abscess, mesh removal 10/10  - PAIN CONTROL  - WOUND CULTURE POSITIVE MRSA 10/5/17   - wound vac to be placed  - picc line in place  - plan iv antibiotics- iv vanco, monitor troughs  - ID consult heydi- SULFAMETHOXAZOLE-TMP DS OR              Where to Get Your Medications      Please  your prescriptions at the location directed by your doctor or nurse    Bring a paper prescription for each of these medications  · HYDROcodone-acetaminophen 5-325 MG oral

## 2019-08-26 ENCOUNTER — APPOINTMENT (OUTPATIENT)
Dept: LAB | Facility: HOSPITAL | Age: 84
End: 2019-08-26
Attending: INTERNAL MEDICINE
Payer: MEDICARE

## 2019-08-26 ENCOUNTER — HOSPITAL ENCOUNTER (OUTPATIENT)
Dept: GENERAL RADIOLOGY | Facility: HOSPITAL | Age: 84
Discharge: HOME | End: 2019-08-26
Attending: INTERNAL MEDICINE
Payer: MEDICARE

## 2019-08-26 ENCOUNTER — OFFICE VISIT (OUTPATIENT)
Dept: INTERNAL MEDICINE CLINIC | Facility: CLINIC | Age: 84
End: 2019-08-26
Payer: MEDICARE

## 2019-08-26 ENCOUNTER — HOSPITAL ENCOUNTER (OUTPATIENT)
Dept: CT IMAGING | Facility: HOSPITAL | Age: 84
Discharge: HOME | End: 2019-08-26
Attending: INTERNAL MEDICINE
Payer: MEDICARE

## 2019-08-26 ENCOUNTER — TELEPHONE (OUTPATIENT)
Dept: INTERNAL MEDICINE CLINIC | Facility: CLINIC | Age: 84
End: 2019-08-26

## 2019-08-26 ENCOUNTER — HOSPITAL ENCOUNTER (OUTPATIENT)
Dept: ULTRASOUND IMAGING | Facility: HOSPITAL | Age: 84
Discharge: HOME | End: 2019-08-26
Attending: INTERNAL MEDICINE
Payer: MEDICARE

## 2019-08-26 VITALS
HEIGHT: 70 IN | HEART RATE: 75 BPM | OXYGEN SATURATION: 97 % | RESPIRATION RATE: 16 BRPM | SYSTOLIC BLOOD PRESSURE: 128 MMHG | DIASTOLIC BLOOD PRESSURE: 76 MMHG | WEIGHT: 211 LBS | BODY MASS INDEX: 30.21 KG/M2 | TEMPERATURE: 98 F

## 2019-08-26 DIAGNOSIS — R06.00 DYSPNEA, UNSPECIFIED TYPE: ICD-10-CM

## 2019-08-26 DIAGNOSIS — Z98.890 HISTORY OF COMMON CAROTID ARTERY STENT PLACEMENT: ICD-10-CM

## 2019-08-26 DIAGNOSIS — R22.43 LOCALIZED SWELLING OF BOTH LOWER LEGS: Primary | ICD-10-CM

## 2019-08-26 DIAGNOSIS — R06.00 DOE (DYSPNEA ON EXERTION): ICD-10-CM

## 2019-08-26 DIAGNOSIS — I10 HYPERTENSION, BENIGN: ICD-10-CM

## 2019-08-26 DIAGNOSIS — I82.412 DEEP VENOUS THROMBOSIS OF LEFT PROFUNDA FEMORIS VEIN (HCC): ICD-10-CM

## 2019-08-26 DIAGNOSIS — Z95.828 HISTORY OF COMMON CAROTID ARTERY STENT PLACEMENT: ICD-10-CM

## 2019-08-26 DIAGNOSIS — I63.512 CEREBROVASCULAR ACCIDENT (CVA) DUE TO OCCLUSION OF LEFT MIDDLE CEREBRAL ARTERY (HCC): ICD-10-CM

## 2019-08-26 DIAGNOSIS — R06.00 DYSPNEA, UNSPECIFIED TYPE: Primary | ICD-10-CM

## 2019-08-26 LAB
ALBUMIN SERPL-MCNC: 4 G/DL (ref 3.4–5)
ALBUMIN/GLOB SERPL: 1 {RATIO} (ref 1–2)
ALP LIVER SERPL-CCNC: 116 U/L (ref 45–117)
ALT SERPL-CCNC: 24 U/L (ref 16–61)
ANION GAP SERPL CALC-SCNC: 9 MMOL/L (ref 0–18)
AST SERPL-CCNC: 15 U/L (ref 15–37)
BILIRUB SERPL-MCNC: 0.8 MG/DL (ref 0.1–2)
BILIRUB UR QL: NEGATIVE
BUN BLD-MCNC: 11 MG/DL (ref 7–18)
BUN/CREAT SERPL: 10.8 (ref 10–20)
CALCIUM BLD-MCNC: 9.7 MG/DL (ref 8.5–10.1)
CHLORIDE SERPL-SCNC: 105 MMOL/L (ref 98–112)
CLARITY UR: CLEAR
CO2 SERPL-SCNC: 30 MMOL/L (ref 21–32)
COLOR UR: YELLOW
CREAT BLD-MCNC: 1.02 MG/DL (ref 0.7–1.3)
D DIMER PPP FEU-MCNC: 1.39 UG/ML FEU (ref ?–0.84)
GLOBULIN PLAS-MCNC: 3.9 G/DL (ref 2.8–4.4)
GLUCOSE BLD-MCNC: 115 MG/DL (ref 70–99)
GLUCOSE UR-MCNC: NEGATIVE MG/DL
HGB UR QL STRIP.AUTO: NEGATIVE
KETONES UR-MCNC: NEGATIVE MG/DL
LEUKOCYTE ESTERASE UR QL STRIP.AUTO: NEGATIVE
M PROTEIN MFR SERPL ELPH: 7.9 G/DL (ref 6.4–8.2)
NITRITE UR QL STRIP.AUTO: NEGATIVE
OSMOLALITY SERPL CALC.SUM OF ELEC: 298 MOSM/KG (ref 275–295)
PATIENT FASTING: YES
PH UR: 6 [PH] (ref 5–8)
POTASSIUM SERPL-SCNC: 4 MMOL/L (ref 3.5–5.1)
PROT UR-MCNC: NEGATIVE MG/DL
SODIUM SERPL-SCNC: 144 MMOL/L (ref 136–145)
SP GR UR STRIP: 1.02 (ref 1–1.03)
UROBILINOGEN UR STRIP-ACNC: <2
VIT C UR-MCNC: NEGATIVE MG/DL

## 2019-08-26 PROCEDURE — 36415 COLL VENOUS BLD VENIPUNCTURE: CPT

## 2019-08-26 PROCEDURE — 71046 X-RAY EXAM CHEST 2 VIEWS: CPT | Performed by: INTERNAL MEDICINE

## 2019-08-26 PROCEDURE — 80053 COMPREHEN METABOLIC PANEL: CPT

## 2019-08-26 PROCEDURE — 81003 URINALYSIS AUTO W/O SCOPE: CPT | Performed by: INTERNAL MEDICINE

## 2019-08-26 PROCEDURE — 93971 EXTREMITY STUDY: CPT | Performed by: INTERNAL MEDICINE

## 2019-08-26 PROCEDURE — 93010 ELECTROCARDIOGRAM REPORT: CPT | Performed by: INTERNAL MEDICINE

## 2019-08-26 PROCEDURE — 71260 CT THORAX DX C+: CPT | Performed by: INTERNAL MEDICINE

## 2019-08-26 PROCEDURE — 99214 OFFICE O/P EST MOD 30 MIN: CPT | Performed by: INTERNAL MEDICINE

## 2019-08-26 PROCEDURE — 85379 FIBRIN DEGRADATION QUANT: CPT

## 2019-08-26 PROCEDURE — G0463 HOSPITAL OUTPT CLINIC VISIT: HCPCS | Performed by: INTERNAL MEDICINE

## 2019-08-26 PROCEDURE — 93005 ELECTROCARDIOGRAM TRACING: CPT | Performed by: INTERNAL MEDICINE

## 2019-08-27 NOTE — TELEPHONE ENCOUNTER
Please call patient back. I saw patient yesterday. We did some testing. Wife was going to call back to see if patient has echocardiogram scheduled with Dr. Aaron Gutierrez office.   I had also ordered an echocardiogram but we thought if echocardiogram was bianka

## 2019-08-27 NOTE — PROGRESS NOTES
Juan Diego Mendez is a 80year old male. HPI:   Patient presents with:  Leg Swellin week follow up, saw Dr Wilfrido macedo. Pain 9/10 to bilateral legs. Patient states the longer he sits the pain increases.       Patient has persistent lower extremity edema alth area to help with his care. He has had a stroke. He has had a left carotid artery stent and is on dual antiplatelet therapy. He has been followed at [facility]. He does have thyroid nodules. He did see Dr. Dana Gray July 22.   For now just observ insole   • Hearing impairment     bilateral hearing aids   • Hepatic cyst    • Hiatal hernia    • High blood pressure    • High cholesterol    • Internal hemorrhoids    • Malignant tumor of neck (HCC)    • Moderate aortic insufficiency    • Other and unspe shows normal sinus rhythm. Atrial rhythm. First-degree AV block. Abnormal P axis. Otherwise no acute changes.  - ELECTROCARDIOGRAM, COMPLETE    2. Deep venous thrombosis of left profunda femoris vein ([address]  As description above.   No anticoagulation nee

## 2019-08-27 NOTE — TELEPHONE ENCOUNTER
Discussed with wife. He does have an echocardiogram scheduled with Dr. Yuridia Christie. That we will evaluate patient for LV dysfunction. Patient will follow-up with me in 1 month.

## 2019-09-30 ENCOUNTER — TELEPHONE (OUTPATIENT)
Dept: INTERNAL MEDICINE CLINIC | Facility: CLINIC | Age: 84
End: 2019-09-30

## 2019-09-30 ENCOUNTER — OFFICE VISIT (OUTPATIENT)
Dept: INTERNAL MEDICINE CLINIC | Facility: CLINIC | Age: 84
End: 2019-09-30
Payer: MEDICARE

## 2019-09-30 ENCOUNTER — APPOINTMENT (OUTPATIENT)
Dept: LAB | Age: 84
End: 2019-09-30
Attending: INTERNAL MEDICINE
Payer: MEDICARE

## 2019-09-30 VITALS
WEIGHT: 211.19 LBS | SYSTOLIC BLOOD PRESSURE: 130 MMHG | BODY MASS INDEX: 30.23 KG/M2 | TEMPERATURE: 98 F | HEART RATE: 66 BPM | HEIGHT: 70 IN | DIASTOLIC BLOOD PRESSURE: 78 MMHG | OXYGEN SATURATION: 99 %

## 2019-09-30 DIAGNOSIS — Z98.890 HISTORY OF LEFT COMMON CAROTID ARTERY STENT PLACEMENT: ICD-10-CM

## 2019-09-30 DIAGNOSIS — R22.43 LOCALIZED SWELLING OF BOTH LOWER LEGS: ICD-10-CM

## 2019-09-30 DIAGNOSIS — I10 HYPERTENSION, BENIGN: ICD-10-CM

## 2019-09-30 DIAGNOSIS — Z95.828 HISTORY OF LEFT COMMON CAROTID ARTERY STENT PLACEMENT: ICD-10-CM

## 2019-09-30 DIAGNOSIS — I82.412 DEEP VENOUS THROMBOSIS OF LEFT PROFUNDA FEMORIS VEIN (HCC): Primary | ICD-10-CM

## 2019-09-30 DIAGNOSIS — I63.512 CEREBROVASCULAR ACCIDENT (CVA) DUE TO OCCLUSION OF LEFT MIDDLE CEREBRAL ARTERY (HCC): ICD-10-CM

## 2019-09-30 DIAGNOSIS — Z95.1 HX OF CABG: ICD-10-CM

## 2019-09-30 DIAGNOSIS — E04.2 MULTIPLE THYROID NODULES: ICD-10-CM

## 2019-09-30 DIAGNOSIS — Z23 FLU VACCINE NEED: ICD-10-CM

## 2019-09-30 PROCEDURE — 80048 BASIC METABOLIC PNL TOTAL CA: CPT

## 2019-09-30 PROCEDURE — 99214 OFFICE O/P EST MOD 30 MIN: CPT | Performed by: INTERNAL MEDICINE

## 2019-09-30 PROCEDURE — 90662 IIV NO PRSV INCREASED AG IM: CPT | Performed by: INTERNAL MEDICINE

## 2019-09-30 PROCEDURE — G0008 ADMIN INFLUENZA VIRUS VAC: HCPCS | Performed by: INTERNAL MEDICINE

## 2019-09-30 PROCEDURE — 36415 COLL VENOUS BLD VENIPUNCTURE: CPT

## 2019-09-30 PROCEDURE — 80061 LIPID PANEL: CPT

## 2019-09-30 PROCEDURE — G0463 HOSPITAL OUTPT CLINIC VISIT: HCPCS | Performed by: INTERNAL MEDICINE

## 2019-09-30 NOTE — PROGRESS NOTES
Jacquie Roper is a 80year old male. HPI:   Patient presents with: Follow - Up     Patient doing fairly well. No acute complaints. Wife does say that he gets short of breath getting into a car and bending down to put his shoes on.   However he is able update electrolytes and lipids. He is hoping to see Dr. Neda Ji this Wednesday when he has his echocardiogram.  Did ask wife to bring up the shortness of breath with Dr. Neda Ji.     His last stress test was October 2018 showing normal LV ejection frac Medical History:   Diagnosis Date   • Acute, but ill-defined, cerebrovascular disease 12/2018    Per patient wife, pt had stroke December 2018   • Ankle fracture, left    • CAD (coronary artery disease)     cardiologist dr. Clara Dowling   • Calcaneal spur, lef S2 normal  GI:  good BS's,  no masses,   HSM or tenderness  EXTREMITIES : no cyanosis, clubbing or edema    ASSESSMENT AND PLAN:     1. Deep venous thrombosis of left profunda femoris vein (HCC)  This is felt not to need anticoagulation.   I did discuss [de-identified] ultrasound for follow-up of his thyroid nodules July 18, 2019. Note is made of right lobe nodule #1 was in the midpole and was 1.3 x 0.8 x 1 cm that was unchanged. Left lobe also showed a nodule in the midpole 1.3 x 1.2 x 1.1 cm unchanged.   Isthmus showe

## 2019-09-30 NOTE — TELEPHONE ENCOUNTER
Please notify wife that patient's labs look good. Potassium good. Kidney function good. Blood sugar just a little bit elevated at 116. Lipids under good control. No changes needed for now.   I would continue his furosemide and potassium since it is teodora

## 2019-09-30 NOTE — TELEPHONE ENCOUNTER
I spoke with patient's wife, Eleuterio Momin per EMELY, and relayed Dr. Elyse Haddad message. She verbalized understanding.

## 2019-10-01 ENCOUNTER — TELEPHONE (OUTPATIENT)
Dept: INTERNAL MEDICINE CLINIC | Facility: CLINIC | Age: 84
End: 2019-10-01

## 2019-10-01 NOTE — TELEPHONE ENCOUNTER
Please let patient know that I did receive patient's carotid Doppler that he had with Dr. Hieu Guillory. As she indicated that the stent in his left carotid artery is open.   Also the nodule was seen but patient is already had his thyroid ultrasound this July that

## 2019-10-01 NOTE — TELEPHONE ENCOUNTER
I spoke with patient's wife, Emani Carrington, [place] per HIPPA, and relayed Dr. Jeanne Holt message. She verbalized understanding.  Copy of report at  for her to  per her request.

## 2019-10-02 ENCOUNTER — ANCILLARY PROCEDURE (OUTPATIENT)
Dept: CARDIOLOGY | Age: 84
End: 2019-10-02
Attending: INTERNAL MEDICINE

## 2019-10-02 DIAGNOSIS — I35.1 NONRHEUMATIC AORTIC VALVE INSUFFICIENCY: ICD-10-CM

## 2019-10-02 DIAGNOSIS — E78.00 PURE HYPERCHOLESTEROLEMIA: ICD-10-CM

## 2019-10-02 PROCEDURE — 93306 TTE W/DOPPLER COMPLETE: CPT | Performed by: INTERNAL MEDICINE

## 2019-10-03 ENCOUNTER — TELEPHONE (OUTPATIENT)
Dept: CARDIOLOGY | Age: 84
End: 2019-10-03

## 2019-10-25 ENCOUNTER — TELEPHONE (OUTPATIENT)
Dept: CARDIOLOGY | Age: 84
End: 2019-10-25

## 2019-10-25 RX ORDER — ATORVASTATIN CALCIUM 40 MG/1
TABLET, FILM COATED ORAL
Qty: 90 TABLET | Refills: 1 | OUTPATIENT
Start: 2019-10-25

## 2019-10-25 RX ORDER — ATORVASTATIN CALCIUM 40 MG/1
40 TABLET, FILM COATED ORAL DAILY
Qty: 90 TABLET | Refills: 0 | Status: SHIPPED | OUTPATIENT
Start: 2019-10-25 | End: 2020-02-27 | Stop reason: SDUPTHER

## 2019-11-11 ENCOUNTER — OFFICE VISIT (OUTPATIENT)
Dept: INTERNAL MEDICINE CLINIC | Facility: CLINIC | Age: 84
End: 2019-11-11
Payer: MEDICARE

## 2019-11-11 ENCOUNTER — APPOINTMENT (OUTPATIENT)
Dept: CT IMAGING | Facility: HOSPITAL | Age: 84
End: 2019-11-11
Attending: EMERGENCY MEDICINE
Payer: MEDICARE

## 2019-11-11 ENCOUNTER — APPOINTMENT (OUTPATIENT)
Dept: ULTRASOUND IMAGING | Facility: HOSPITAL | Age: 84
End: 2019-11-11
Payer: MEDICARE

## 2019-11-11 ENCOUNTER — APPOINTMENT (OUTPATIENT)
Dept: GENERAL RADIOLOGY | Facility: HOSPITAL | Age: 84
End: 2019-11-11
Payer: MEDICARE

## 2019-11-11 ENCOUNTER — HOSPITAL ENCOUNTER (EMERGENCY)
Facility: HOSPITAL | Age: 84
Discharge: HOME | End: 2019-11-11
Payer: MEDICARE

## 2019-11-11 VITALS
DIASTOLIC BLOOD PRESSURE: 72 MMHG | HEART RATE: 73 BPM | BODY MASS INDEX: 30.78 KG/M2 | OXYGEN SATURATION: 98 % | HEIGHT: 70 IN | SYSTOLIC BLOOD PRESSURE: 139 MMHG | WEIGHT: 215 LBS | RESPIRATION RATE: 20 BRPM | TEMPERATURE: 98 F

## 2019-11-11 VITALS
SYSTOLIC BLOOD PRESSURE: 130 MMHG | HEART RATE: 80 BPM | HEIGHT: 70 IN | OXYGEN SATURATION: 97 % | DIASTOLIC BLOOD PRESSURE: 74 MMHG | WEIGHT: 215 LBS | BODY MASS INDEX: 30.78 KG/M2

## 2019-11-11 DIAGNOSIS — E04.2 MULTIPLE THYROID NODULES: ICD-10-CM

## 2019-11-11 DIAGNOSIS — R06.00 DYSPNEA ON EXERTION: Primary | ICD-10-CM

## 2019-11-11 DIAGNOSIS — I82.412 DEEP VENOUS THROMBOSIS OF LEFT PROFUNDA FEMORIS VEIN (HCC): ICD-10-CM

## 2019-11-11 DIAGNOSIS — I10 HYPERTENSION, BENIGN: ICD-10-CM

## 2019-11-11 DIAGNOSIS — I63.512 CEREBROVASCULAR ACCIDENT (CVA) DUE TO OCCLUSION OF LEFT MIDDLE CEREBRAL ARTERY (HCC): Primary | ICD-10-CM

## 2019-11-11 DIAGNOSIS — Z98.890 HISTORY OF LEFT COMMON CAROTID ARTERY STENT PLACEMENT: ICD-10-CM

## 2019-11-11 DIAGNOSIS — R53.1 WEAKNESS: ICD-10-CM

## 2019-11-11 DIAGNOSIS — R06.00 DYSPNEA ON EXERTION: ICD-10-CM

## 2019-11-11 DIAGNOSIS — R53.1 WEAKNESS GENERALIZED: ICD-10-CM

## 2019-11-11 DIAGNOSIS — Z95.828 HISTORY OF LEFT COMMON CAROTID ARTERY STENT PLACEMENT: ICD-10-CM

## 2019-11-11 PROCEDURE — 83880 ASSAY OF NATRIURETIC PEPTIDE: CPT

## 2019-11-11 PROCEDURE — 99214 OFFICE O/P EST MOD 30 MIN: CPT | Performed by: INTERNAL MEDICINE

## 2019-11-11 PROCEDURE — 84484 ASSAY OF TROPONIN QUANT: CPT

## 2019-11-11 PROCEDURE — 93971 EXTREMITY STUDY: CPT

## 2019-11-11 PROCEDURE — 93010 ELECTROCARDIOGRAM REPORT: CPT | Performed by: INTERNAL MEDICINE

## 2019-11-11 PROCEDURE — 71260 CT THORAX DX C+: CPT | Performed by: EMERGENCY MEDICINE

## 2019-11-11 PROCEDURE — 85379 FIBRIN DEGRADATION QUANT: CPT | Performed by: EMERGENCY MEDICINE

## 2019-11-11 PROCEDURE — 85025 COMPLETE CBC W/AUTO DIFF WBC: CPT

## 2019-11-11 PROCEDURE — 36415 COLL VENOUS BLD VENIPUNCTURE: CPT

## 2019-11-11 PROCEDURE — 94640 AIRWAY INHALATION TREATMENT: CPT

## 2019-11-11 PROCEDURE — 80048 BASIC METABOLIC PNL TOTAL CA: CPT

## 2019-11-11 PROCEDURE — 93005 ELECTROCARDIOGRAM TRACING: CPT

## 2019-11-11 PROCEDURE — 99285 EMERGENCY DEPT VISIT HI MDM: CPT

## 2019-11-11 PROCEDURE — G0463 HOSPITAL OUTPT CLINIC VISIT: HCPCS | Performed by: INTERNAL MEDICINE

## 2019-11-11 RX ORDER — PREDNISONE 20 MG/1
40 TABLET ORAL ONCE
Status: COMPLETED | OUTPATIENT
Start: 2019-11-11 | End: 2019-11-11

## 2019-11-11 RX ORDER — IPRATROPIUM BROMIDE AND ALBUTEROL SULFATE 2.5; .5 MG/3ML; MG/3ML
3 SOLUTION RESPIRATORY (INHALATION) ONCE
Status: COMPLETED | OUTPATIENT
Start: 2019-11-11 | End: 2019-11-11

## 2019-11-11 RX ORDER — ALBUTEROL SULFATE 90 UG/1
2 AEROSOL, METERED RESPIRATORY (INHALATION) EVERY 4 HOURS PRN
Qty: 1 INHALER | Refills: 0 | Status: SHIPPED | OUTPATIENT
Start: 2019-11-11 | End: 2019-12-11

## 2019-11-11 RX ORDER — PREDNISONE 20 MG/1
40 TABLET ORAL DAILY
Qty: 8 TABLET | Refills: 0 | Status: SHIPPED | OUTPATIENT
Start: 2019-11-11 | End: 2019-11-15

## 2019-11-11 NOTE — ED PROVIDER NOTES
Patient Seen in: [facility] Emergency Department    History   Patient presents with:  Dyspnea REBEKAH SOB (respiratory)    Stated Complaint: sob     HPI    Patient presents with recommendation of his primary care physician.   He has seen his doctor buzz CHOLECYSTECTOMY  1996    laparoscopic cholecystectomy dr. Will Isaacs   • HERNIA SURGERY  11/24/15     robotic right inguinal hernia repair with mesh   • HERNIA SURGERY  4/7/2016    robotic repair of paraesophageal hernia with biologic mesh/Nissen fundoplica Review of Systems  Constitutional: no fever, normal appetite  HEENT: No cough, no congestion  Cardiovascular: no chest pain  Respiratory: shortness of breath  Gastrointestinal: no abdominal pain, no nausea, no vomiting  Genitourinary: no dysuria  [facility] [place] [facility] better after the breathing treatment his color was certainly improved he felt better. May be a component of bronchospasm though I did listen to his lungs again there is no wheezing.   We will give him dose of oral prednisone he was comfortable with this an RAINBOW DRAW BLUE   RAINBOW DRAW GOLD     EKG    Rate, intervals and axes as noted on EKG Report.   Rate: EKG shows rate 80 normal sinus rhythm no ST elevation seen              MDM     98% Normal  Pulse oximetry    Cardiac Monitor: Normal sinus rhythm

## 2019-11-11 NOTE — PROGRESS NOTES
Anil Alva is a 80year old male   HPI:   Pt.presents for the following problems. Patient seen about a month ago. He is here with his wife. His wife indicates that he is getting weaker. Having a difficult time walking.   Having a difficult time gett TWICE A DAY WITH MEALS 180 tablet 3   • finasteride 5 MG Oral Tab      • docusate sodium 50 MG Oral Cap Take 50 mg by mouth 2 (two) times daily. • Pantoprazole Sodium 40 MG Oral Tab EC Take 1 tablet (40 mg total) by mouth daily.  90 tablet 3   • Clopido laparoscopic cholecystectomy dr. Millie Hernández   • HERNIA SURGERY  11/24/15     robotic right inguinal hernia repair with mesh   • HERNIA SURGERY  4/7/2016    robotic repair of paraesophageal hernia with biologic mesh/Nissen fundoplication   • HIP REPLACEMENT normal  CARDIO:  RRR without murmur. S1 normal S2 normal.   GI:  good BS's,  no masses,  HSM or tenderness  EXTREMITIES: Patient has about 1+ edema lower extremities. NEURO:  Awake and aware.   She has difficulty getting up from a seated position with see

## 2019-11-11 NOTE — ED INITIAL ASSESSMENT (HPI)
Pt sent to ED from Dr. Ted Connolly for c/o increased SOB and labored breathing x 2 weeks. Pt denies CP. Dr. Ted Connolly reports recent choking episode and is concerned about an aspiration pneumonia.  Would also like to evaluate DVT in LLE

## 2019-11-12 ENCOUNTER — TELEPHONE (OUTPATIENT)
Dept: INTERNAL MEDICINE CLINIC | Facility: CLINIC | Age: 84
End: 2019-11-12

## 2019-11-14 ENCOUNTER — OFFICE VISIT (OUTPATIENT)
Dept: INTERNAL MEDICINE CLINIC | Facility: CLINIC | Age: 84
End: 2019-11-14
Payer: MEDICARE

## 2019-11-14 ENCOUNTER — TELEPHONE (OUTPATIENT)
Dept: INTERNAL MEDICINE CLINIC | Facility: CLINIC | Age: 84
End: 2019-11-14

## 2019-11-14 VITALS
HEART RATE: 83 BPM | HEIGHT: 70 IN | WEIGHT: 220.25 LBS | DIASTOLIC BLOOD PRESSURE: 70 MMHG | BODY MASS INDEX: 31.53 KG/M2 | TEMPERATURE: 97 F | SYSTOLIC BLOOD PRESSURE: 126 MMHG

## 2019-11-14 DIAGNOSIS — Z95.828 HISTORY OF LEFT COMMON CAROTID ARTERY STENT PLACEMENT: ICD-10-CM

## 2019-11-14 DIAGNOSIS — R06.00 DYSPNEA ON EXERTION: ICD-10-CM

## 2019-11-14 DIAGNOSIS — I82.412 DEEP VENOUS THROMBOSIS OF LEFT PROFUNDA FEMORIS VEIN (HCC): ICD-10-CM

## 2019-11-14 DIAGNOSIS — R53.1 WEAKNESS: ICD-10-CM

## 2019-11-14 DIAGNOSIS — Z98.890 HISTORY OF LEFT COMMON CAROTID ARTERY STENT PLACEMENT: ICD-10-CM

## 2019-11-14 DIAGNOSIS — I63.512 CEREBROVASCULAR ACCIDENT (CVA) DUE TO OCCLUSION OF LEFT MIDDLE CEREBRAL ARTERY (HCC): Primary | ICD-10-CM

## 2019-11-14 DIAGNOSIS — I10 HYPERTENSION, BENIGN: ICD-10-CM

## 2019-11-14 DIAGNOSIS — R06.00 DYSPNEA, UNSPECIFIED TYPE: ICD-10-CM

## 2019-11-14 PROBLEM — J40 BRONCHITIS: Status: ACTIVE | Noted: 2019-11-14

## 2019-11-14 PROCEDURE — G0463 HOSPITAL OUTPT CLINIC VISIT: HCPCS | Performed by: INTERNAL MEDICINE

## 2019-11-14 PROCEDURE — 1111F DSCHRG MED/CURRENT MED MERGE: CPT | Performed by: INTERNAL MEDICINE

## 2019-11-14 PROCEDURE — 99215 OFFICE O/P EST HI 40 MIN: CPT | Performed by: INTERNAL MEDICINE

## 2019-11-14 RX ORDER — FLUTICASONE PROPIONATE AND SALMETEROL 100; 50 UG/1; UG/1
1 POWDER RESPIRATORY (INHALATION) 2 TIMES DAILY
Qty: 1 PACKAGE | Refills: 3 | Status: SHIPPED | OUTPATIENT
Start: 2019-11-14 | End: 2020-01-16

## 2019-11-14 NOTE — TELEPHONE ENCOUNTER
Anoop is calling pt is at the pharmacy to have a pneumonia vaccine, she is calling to find out which one the pt has had in the past  Call given to nursing

## 2019-11-14 NOTE — PROGRESS NOTES
David Grandchild is a 80year old male. HPI:   Patient presents with:  Hospital F/U     Patient here for follow-up. He was sent to the emergency room from office November 11. I was concerned at that time that patient was short of breath.   Evaluation in the therefore no anticoagulation is needed. Her cyst on the left. In addition patient was noted also by a venous Doppler August 2019 to show a Baker's cyst in the right popliteal fossa.   Wife notes that right lower extremity is a little bit more swollen than Oral Tab Take 81 mg by mouth daily. • Atorvastatin Calcium (LIPITOR) 40 MG Oral Tab Take 1 tablet by mouth nightly.           Past Medical History:   Diagnosis Date   • Acute, but ill-defined, cerebrovascular disease 12/2018    Per patient wife, pt had rate stayed in the 70 range    GENERAL:  well developed, well nourished, in no apparent distress  SKIN:  no rashes , no suspicious lesions  HEENT: atraumatic. Pharynx normal without exudate. EYES:  PERRL. Sclera anicteric.   NECK:  Supple,  no adenopathy

## 2019-11-14 NOTE — TELEPHONE ENCOUNTER
Spoke to pharmacist at [facility], notified her that patient had   Prevnar 13 5/7/15  Pneumovax 23 7/5/02

## 2019-11-18 ENCOUNTER — TELEPHONE (OUTPATIENT)
Dept: INTERNAL MEDICINE CLINIC | Facility: CLINIC | Age: 84
End: 2019-11-18

## 2019-11-18 NOTE — TELEPHONE ENCOUNTER
Barney Emery is calling pt. Had a reaction to an inhaler Dr. Murali Dickerson prescribed she wants to know if he should discontinue using it please advise ph.  # [phone]   Routed high to clinical

## 2019-11-18 NOTE — TELEPHONE ENCOUNTER
Please advise - called spouse per hipaa who states patient used Advair Saturday morning and in the evening he had difficulty eating and his mouth hurt. They stopped using the inhaler and his mouth feels better now - to DR. PECK

## 2019-11-19 NOTE — TELEPHONE ENCOUNTER
Message noted. Please let wife know that I sent over another inhaler called Flovent. This might be a little bit milder. I am hoping he can tolerate this. This is an inhaled steroid to help what I think might be an asthma-like condition.   However before

## 2019-11-19 NOTE — TELEPHONE ENCOUNTER
I spoke with Hakeem Sullivan, ok per EMELY, and relayed Dr. Mainor Singh message. She verbalized understanding. Invited her to call back with any questions or concerns.

## 2019-11-20 NOTE — TELEPHONE ENCOUNTER
To DR PECK  Spouse calling --  flovent over $200  Can something else be ordered and sent to [facility]

## 2019-11-20 NOTE — TELEPHONE ENCOUNTER
Flovent is over $200 - can something else be ordered & be sent to [facility]..        Call spouse: [phone]

## 2019-11-21 NOTE — TELEPHONE ENCOUNTER
S/w spouse Yessica Higgins. She verified that patient was given a albuterol inhaler. I gave instruction to use 2 puffs TID. Pt did not receive education to how to properly use.  Called transferred to psr to schedule visit with carlo Caban

## 2019-11-21 NOTE — TELEPHONE ENCOUNTER
I think he was given an inhaler from the ER. Albuterol inhaler? ? If so he can use 2 puffs three times a day. Ask if they showed him how to use it properly. If not Livia Roberts can show him.

## 2019-11-25 ENCOUNTER — NURSE ONLY (OUTPATIENT)
Dept: INTERNAL MEDICINE CLINIC | Facility: CLINIC | Age: 84
End: 2019-11-25
Payer: MEDICARE

## 2019-11-25 PROCEDURE — G0463 HOSPITAL OUTPT CLINIC VISIT: HCPCS

## 2019-11-25 RX ORDER — INHALER, ASSIST DEVICES
SPACER (EA) MISCELLANEOUS
Qty: 1 EACH | Refills: 0 | Status: SHIPPED | OUTPATIENT
Start: 2019-11-25 | End: 2020-01-16

## 2019-12-05 RX ORDER — FUROSEMIDE 20 MG/1
TABLET ORAL
Qty: 30 TABLET | Refills: 5 | Status: SHIPPED | OUTPATIENT
Start: 2019-12-05 | End: 2020-06-05

## 2019-12-05 RX ORDER — POTASSIUM CHLORIDE 750 MG/1
TABLET, FILM COATED, EXTENDED RELEASE ORAL
Qty: 30 TABLET | Refills: 5 | Status: SHIPPED | OUTPATIENT
Start: 2019-12-05 | End: 2020-06-11

## 2019-12-05 NOTE — TELEPHONE ENCOUNTER
Dr. Wes Pagan, please review the refill requests for potassium and furosemide. These were started back in August by Dr. Randall Lake. Thank you.

## 2019-12-11 ENCOUNTER — TELEPHONE (OUTPATIENT)
Dept: INTERNAL MEDICINE CLINIC | Facility: CLINIC | Age: 84
End: 2019-12-11

## 2019-12-11 ENCOUNTER — APPOINTMENT (OUTPATIENT)
Dept: ULTRASOUND IMAGING | Facility: HOSPITAL | Age: 84
End: 2019-12-11
Attending: EMERGENCY MEDICINE
Payer: MEDICARE

## 2019-12-11 ENCOUNTER — APPOINTMENT (OUTPATIENT)
Dept: GENERAL RADIOLOGY | Facility: HOSPITAL | Age: 84
End: 2019-12-11
Payer: MEDICARE

## 2019-12-11 ENCOUNTER — HOSPITAL ENCOUNTER (EMERGENCY)
Facility: HOSPITAL | Age: 84
Discharge: HOME | End: 2019-12-11
Payer: MEDICARE

## 2019-12-11 VITALS
SYSTOLIC BLOOD PRESSURE: 131 MMHG | WEIGHT: 220 LBS | OXYGEN SATURATION: 95 % | HEART RATE: 95 BPM | TEMPERATURE: 98 F | BODY MASS INDEX: 31.5 KG/M2 | DIASTOLIC BLOOD PRESSURE: 82 MMHG | RESPIRATION RATE: 16 BRPM | HEIGHT: 70 IN

## 2019-12-11 DIAGNOSIS — M25.461 KNEE EFFUSION, RIGHT: ICD-10-CM

## 2019-12-11 DIAGNOSIS — M71.20 SYNOVIAL CYST OF KNEE, UNSPECIFIED LATERALITY: ICD-10-CM

## 2019-12-11 DIAGNOSIS — M11.261 PSEUDOGOUT OF RIGHT KNEE: Primary | ICD-10-CM

## 2019-12-11 DIAGNOSIS — M17.10 ARTHRITIS OF KNEE: ICD-10-CM

## 2019-12-11 PROCEDURE — 73560 X-RAY EXAM OF KNEE 1 OR 2: CPT

## 2019-12-11 PROCEDURE — 99284 EMERGENCY DEPT VISIT MOD MDM: CPT

## 2019-12-11 PROCEDURE — 93971 EXTREMITY STUDY: CPT | Performed by: EMERGENCY MEDICINE

## 2019-12-11 RX ORDER — COLCHICINE 0.6 MG/1
0.6 TABLET ORAL ONCE
Status: COMPLETED | OUTPATIENT
Start: 2019-12-11 | End: 2019-12-11

## 2019-12-11 RX ORDER — COLCHICINE 0.6 MG/1
0.6 TABLET ORAL DAILY
Qty: 3 TABLET | Refills: 0 | Status: SHIPPED | OUTPATIENT
Start: 2019-12-11 | End: 2019-12-14

## 2019-12-11 RX ORDER — IBUPROFEN 600 MG/1
600 TABLET ORAL ONCE
Status: COMPLETED | OUTPATIENT
Start: 2019-12-11 | End: 2019-12-11

## 2019-12-11 NOTE — TELEPHONE ENCOUNTER
As FYI to DR. PECK - called spouse per hipaa who states patients righ tknee and leg hurts , swollen and red , hot to touch - RN instructed spouse to bring patient to ER for assessment - verbalized understanding F/U 12/12

## 2019-12-11 NOTE — TELEPHONE ENCOUNTER
Pt spouse called, hoping pt could be seen by Dr Vance Perez today  Not sure if she should bring patient to the hospital  Concerned about pt knee, swollen and hot, pain in right leg  Concern of blood clot   Tasked to nursing

## 2019-12-11 NOTE — ED INITIAL ASSESSMENT (HPI)
Pt states atraumatic right knee pain for the past week. Pt states he's unable to ambulate due to pain.

## 2019-12-11 NOTE — ED PROVIDER NOTES
Patient Seen in: [facility] AND [facility] Emergency Department      History   Patient presents with:  Pain    Stated Complaint: right knee pain     HPI    Patient is an 27-year-old male who presents with right knee pain x1 week.   Getting progressively worse to REPLACEMENT SURGERY Right 2012   • INCISION AND DRAINAGE Left 10/10/2017    Performed by Marylen Celestine, MD at [facility] OR   • OTHER SURGICAL HISTORY  4/7/16    robotic repair of incarcerated paraesophageal hernia with biologic mesh robotic nissen fundoplic Date: 12/11/2019  CONCLUSION:  1. No right lower extremity DVT. 2.  Moderate prepatellar bursal effusion.  3. Moderate popliteal/Baker's cyst.    Dictated by (CST): Maryse Kilgore MD on 12/11/2019 at 15:53     Approved by (CST): Maryse Kilgore MD on 12/11/2

## 2019-12-12 NOTE — TELEPHONE ENCOUNTER
Please advise  to DR. PECK - called spouse per hipaa who states patient has heydi with DR. Aguilar on Friday.  Was cortney dhe can take advil , butpatient had stroke, so spouse wants to know if he can take that

## 2019-12-12 NOTE — TELEPHONE ENCOUNTER
Please call wife as she takes care of patient. I was able to review his emergency room visit and it looks like he has developed pain and swelling around the knee.   There looks to be water on the knee and it looks like he might have an inflammatory process

## 2019-12-12 NOTE — TELEPHONE ENCOUNTER
Message noted. He should NOT take product such as Advil, ibuprofen, Motrin, Aleve because they can interfere with his aspirin and Plavix. They can increase his risk of bleeding. For pain I would have him take Tylenol 325 mg strength.   He can take 2 tabl

## 2019-12-16 ENCOUNTER — TELEPHONE (OUTPATIENT)
Dept: INTERNAL MEDICINE CLINIC | Facility: CLINIC | Age: 84
End: 2019-12-16

## 2019-12-16 ENCOUNTER — OFFICE VISIT (OUTPATIENT)
Dept: INTERNAL MEDICINE CLINIC | Facility: CLINIC | Age: 84
End: 2019-12-16
Payer: MEDICARE

## 2019-12-16 VITALS
SYSTOLIC BLOOD PRESSURE: 130 MMHG | HEART RATE: 76 BPM | DIASTOLIC BLOOD PRESSURE: 60 MMHG | OXYGEN SATURATION: 97 % | BODY MASS INDEX: 31.21 KG/M2 | WEIGHT: 218 LBS | HEIGHT: 70 IN

## 2019-12-16 DIAGNOSIS — Z98.890 HISTORY OF LEFT COMMON CAROTID ARTERY STENT PLACEMENT: ICD-10-CM

## 2019-12-16 DIAGNOSIS — I63.512 CEREBROVASCULAR ACCIDENT (CVA) DUE TO OCCLUSION OF LEFT MIDDLE CEREBRAL ARTERY (HCC): ICD-10-CM

## 2019-12-16 DIAGNOSIS — M25.561 ACUTE PAIN OF RIGHT KNEE: Primary | ICD-10-CM

## 2019-12-16 DIAGNOSIS — I82.412 DEEP VENOUS THROMBOSIS OF LEFT PROFUNDA FEMORIS VEIN (HCC): ICD-10-CM

## 2019-12-16 DIAGNOSIS — I10 HYPERTENSION, BENIGN: ICD-10-CM

## 2019-12-16 DIAGNOSIS — R13.11 ORAL PHASE DYSPHAGIA: ICD-10-CM

## 2019-12-16 DIAGNOSIS — Z95.828 HISTORY OF LEFT COMMON CAROTID ARTERY STENT PLACEMENT: ICD-10-CM

## 2019-12-16 PROCEDURE — G0463 HOSPITAL OUTPT CLINIC VISIT: HCPCS | Performed by: INTERNAL MEDICINE

## 2019-12-16 PROCEDURE — 99214 OFFICE O/P EST MOD 30 MIN: CPT | Performed by: INTERNAL MEDICINE

## 2019-12-17 NOTE — TELEPHONE ENCOUNTER
Patient called and spoke with wife, Trinh Dejesus, and relayed Dr Hernandez Living message. Trinh Dejesus verbalized understanding with no further questions noted.

## 2019-12-17 NOTE — PROGRESS NOTES
Apurva Bautista is a 80year old male. HPI:   Patient presents with:  Er F/u: ED on 12/11     Patient was in the emergency room last week. This was for right knee pain. This was fairly acute onset.   X-ray of right knee showed advanced osteoarthritis of r MOUTH TWICE A DAY WITH MEALS 180 tablet 3   • finasteride 5 MG Oral Tab      • docusate sodium 50 MG Oral Cap Take 50 mg by mouth 2 (two) times daily. • Pantoprazole Sodium 40 MG Oral Tab EC Take 1 tablet (40 mg total) by mouth daily.  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 On aspirin and Plavix. 4. Hypertension, benign  Stable. On antihypertensive therapy.     5. 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

## 2019-12-17 NOTE — TELEPHONE ENCOUNTER
Please call patient and let her know that after patient left I thought it might be good to wear a support hose on his right lower extremity to keep the swelling down. Please see if they have any at home.   Otherwise they can get light compression support h

## 2019-12-26 ENCOUNTER — TELEPHONE (OUTPATIENT)
Dept: INTERNAL MEDICINE CLINIC | Facility: CLINIC | Age: 84
End: 2019-12-26

## 2019-12-26 NOTE — TELEPHONE ENCOUNTER
Patient was takng 2 regular Tylenol - they weren't helping. Wife started him on 1 Extra Strength Tylenol & now wants to know if patient can take 2 Extra Strength Tylenol every 4-6 hours?     Call Juju Smith at:  [phone]

## 2019-12-26 NOTE — TELEPHONE ENCOUNTER
I spoke with Brandi Herring per EMELY, and she asks if patient can take 2 extra strength tylenol q 4-6 hours for his right knee pain. She says he had been doing 2 regular tylenol a day but this does not seem to be working well enough. To Dr. Beverly Lua.

## 2019-12-27 NOTE — TELEPHONE ENCOUNTER
I believe the extra strength Tylenol has 500 mg of Tylenol. He should not use any more than 3000 mg of Tylenol per 24 hours. Therefore he can take 2 extra strength Tylenol tablets i.e. 1000 mg up to only 3 times a day.   Please ask wife if he was able to

## 2019-12-27 NOTE — TELEPHONE ENCOUNTER
As FYI to DR. PECK - called spouse per hipaa and relayed [place] Message - verbalized understanding. Patient saw DR. Aguilar, no injection( right knee) but prednisone. F/U with DR. Aguilar if not better and spouse states they will go back- prednisone was helpin

## 2020-01-16 ENCOUNTER — LAB ENCOUNTER (OUTPATIENT)
Dept: LAB | Age: 85
End: 2020-01-16
Attending: INTERNAL MEDICINE
Payer: MEDICARE

## 2020-01-16 ENCOUNTER — OFFICE VISIT (OUTPATIENT)
Dept: INTERNAL MEDICINE CLINIC | Facility: CLINIC | Age: 85
End: 2020-01-16
Payer: MEDICARE

## 2020-01-16 ENCOUNTER — HOSPITAL ENCOUNTER (OUTPATIENT)
Dept: ULTRASOUND IMAGING | Facility: HOSPITAL | Age: 85
Discharge: HOME | End: 2020-01-16
Attending: INTERNAL MEDICINE | Admitting: INTERNAL MEDICINE
Payer: MEDICARE

## 2020-01-16 VITALS
DIASTOLIC BLOOD PRESSURE: 76 MMHG | WEIGHT: 215 LBS | SYSTOLIC BLOOD PRESSURE: 130 MMHG | OXYGEN SATURATION: 98 % | RESPIRATION RATE: 16 BRPM | TEMPERATURE: 98 F | HEART RATE: 86 BPM | HEIGHT: 70 IN | BODY MASS INDEX: 30.78 KG/M2

## 2020-01-16 DIAGNOSIS — Z95.828 HISTORY OF LEFT COMMON CAROTID ARTERY STENT PLACEMENT: ICD-10-CM

## 2020-01-16 DIAGNOSIS — R73.9 ELEVATED BLOOD SUGAR: ICD-10-CM

## 2020-01-16 DIAGNOSIS — I63.512 CEREBROVASCULAR ACCIDENT (CVA) DUE TO OCCLUSION OF LEFT MIDDLE CEREBRAL ARTERY (HCC): Primary | ICD-10-CM

## 2020-01-16 DIAGNOSIS — Z00.00 ROUTINE HEALTH MAINTENANCE: ICD-10-CM

## 2020-01-16 DIAGNOSIS — I10 HYPERTENSION, BENIGN: ICD-10-CM

## 2020-01-16 DIAGNOSIS — Z98.890 HISTORY OF LEFT COMMON CAROTID ARTERY STENT PLACEMENT: ICD-10-CM

## 2020-01-16 DIAGNOSIS — I82.412 DEEP VENOUS THROMBOSIS OF LEFT PROFUNDA FEMORIS VEIN (HCC): ICD-10-CM

## 2020-01-16 LAB
ANION GAP SERPL CALC-SCNC: 4 MMOL/L (ref 0–18)
BASOPHILS # BLD AUTO: 0.05 X10(3) UL (ref 0–0.2)
BASOPHILS NFR BLD AUTO: 0.6 %
BUN BLD-MCNC: 17 MG/DL (ref 7–18)
BUN/CREAT SERPL: 18.5 (ref 10–20)
CALCIUM BLD-MCNC: 9.9 MG/DL (ref 8.5–10.1)
CHLORIDE SERPL-SCNC: 105 MMOL/L (ref 98–112)
CO2 SERPL-SCNC: 32 MMOL/L (ref 21–32)
CREAT BLD-MCNC: 0.92 MG/DL (ref 0.7–1.3)
DEPRECATED RDW RBC AUTO: 46.5 FL (ref 35.1–46.3)
EOSINOPHIL # BLD AUTO: 0.06 X10(3) UL (ref 0–0.7)
EOSINOPHIL NFR BLD AUTO: 0.7 %
ERYTHROCYTE [DISTWIDTH] IN BLOOD BY AUTOMATED COUNT: 14.6 % (ref 11–15)
EST. AVERAGE GLUCOSE BLD GHB EST-MCNC: 163 MG/DL (ref 68–126)
GLUCOSE BLD-MCNC: 111 MG/DL (ref 70–99)
HBA1C MFR BLD HPLC: 7.3 % (ref ?–5.7)
HCT VFR BLD AUTO: 46.7 % (ref 39–53)
HGB BLD-MCNC: 15.1 G/DL (ref 13–17.5)
IMM GRANULOCYTES # BLD AUTO: 0.11 X10(3) UL (ref 0–1)
IMM GRANULOCYTES NFR BLD: 1.2 %
LYMPHOCYTES # BLD AUTO: 1.2 X10(3) UL (ref 1–4)
LYMPHOCYTES NFR BLD AUTO: 13.3 %
MCH RBC QN AUTO: 28.5 PG (ref 26–34)
MCHC RBC AUTO-ENTMCNC: 32.3 G/DL (ref 31–37)
MCV RBC AUTO: 88.3 FL (ref 80–100)
MONOCYTES # BLD AUTO: 0.71 X10(3) UL (ref 0.1–1)
MONOCYTES NFR BLD AUTO: 7.9 %
NEUTROPHILS # BLD AUTO: 6.91 X10 (3) UL (ref 1.5–7.7)
NEUTROPHILS # BLD AUTO: 6.91 X10(3) UL (ref 1.5–7.7)
NEUTROPHILS NFR BLD AUTO: 76.3 %
OSMOLALITY SERPL CALC.SUM OF ELEC: 294 MOSM/KG (ref 275–295)
PATIENT FASTING Y/N/NP: NO
PLATELET # BLD AUTO: 212 10(3)UL (ref 150–450)
POTASSIUM SERPL-SCNC: 4.2 MMOL/L (ref 3.5–5.1)
RBC # BLD AUTO: 5.29 X10(6)UL (ref 3.8–5.8)
SODIUM SERPL-SCNC: 141 MMOL/L (ref 136–145)
WBC # BLD AUTO: 9 X10(3) UL (ref 4–11)

## 2020-01-16 PROCEDURE — G0463 HOSPITAL OUTPT CLINIC VISIT: HCPCS | Performed by: INTERNAL MEDICINE

## 2020-01-16 PROCEDURE — 36415 COLL VENOUS BLD VENIPUNCTURE: CPT

## 2020-01-16 PROCEDURE — 83036 HEMOGLOBIN GLYCOSYLATED A1C: CPT

## 2020-01-16 PROCEDURE — 80048 BASIC METABOLIC PNL TOTAL CA: CPT

## 2020-01-16 PROCEDURE — 99214 OFFICE O/P EST MOD 30 MIN: CPT | Performed by: INTERNAL MEDICINE

## 2020-01-16 PROCEDURE — 85025 COMPLETE CBC W/AUTO DIFF WBC: CPT

## 2020-01-16 PROCEDURE — 93971 EXTREMITY STUDY: CPT | Performed by: INTERNAL MEDICINE

## 2020-01-16 NOTE — PROGRESS NOTES
Surya Arevalo is a 80year old male. HPI:   Patient presents with:  Checkup: 1 month, patient c/o pain 4/10 to right knee and the is doing physical therapy for currently       Patient generally feels well.   He looks better than he has had in the past.  He 2019 it was felt he needed no further thyroid ultrasounds. He will be followed clinically.   Current Outpatient Medications   Medication Sig Dispense Refill   • POTASSIUM CHLORIDE ER 10 MEQ Oral Tab CR TAKE ONE TABLET BY MOUTH ONE TIME DAILY  30 tablet 5 fasciitis, left 05-    readjust insole, add more support   • Plantar fasciitis, left 04-    xray/readjust insole   • Salivary gland cancer ([facility] [place] [address] 2006   • Stroke [facility]) 12/13/2018   • Unspecified essential hypertension       Social History:   So Future    3. History of left common carotid artery stent placement  Stent placed left carotid with dual antiplatelet therapy. 4. Deep venous thrombosis of left profunda femoris vein (HCC)  History of DVT left profunda femoris vein.   It had been felt vinicio

## 2020-01-20 ENCOUNTER — TELEPHONE (OUTPATIENT)
Dept: INTERNAL MEDICINE CLINIC | Facility: CLINIC | Age: 85
End: 2020-01-20

## 2020-01-20 NOTE — TELEPHONE ENCOUNTER
Please advise - called spouse per hipaa who wants to know if patient should be on medications for his A1C , before she mentions anything to the doctors in the hospital he is at thanks - to DR. PECK

## 2020-01-20 NOTE — TELEPHONE ENCOUNTER
Wife calling back. She stated she just spoke to someone in our office regarding pt A1C she is asking if pt need medication for this and if so who will be prescribing. don't see any message regarding a lab.

## 2020-01-20 NOTE — TELEPHONE ENCOUNTER
As FYI to DR. PECK - called spouse per hipaa who states that  is at [facility], hurt right knee , slipped out of chair.  Patient will start PT today in hospital ( spouse states no fractures )

## 2020-01-20 NOTE — TELEPHONE ENCOUNTER
Please let her know that while he is in the hospital I am hoping that he can have his blood sugar checked before breakfast, before lunch, before dinner and bedtime. Then a decision about medication can be given.   Also if he could see a dietitian to get co

## 2020-01-22 ENCOUNTER — TELEPHONE (OUTPATIENT)
Dept: INTERNAL MEDICINE CLINIC | Facility: CLINIC | Age: 85
End: 2020-01-22

## 2020-01-22 NOTE — TELEPHONE ENCOUNTER
Spoke with Juju Smith. Jean came home from [facility] in [address] [place] [address] on Sunday night. She went today to  the rx that they sent in for him, diclofenac gel.  When she picked it up she realized it is a topical NSAID and she knows that Dr Martín Mclain

## 2020-01-22 NOTE — TELEPHONE ENCOUNTER
Please call pt spouse, Ayde Ship  Pt was prescribed Diclofenac (topical gel) while in the Hospital  Is this an Nsaid? Pt was told by Dr Ted Connolly to avoid these.   Tasked to [employer]

## 2020-01-23 NOTE — TELEPHONE ENCOUNTER
I spoke with Walker Smoker, [place] per HIPPA, and relayed Dr. Emma Lal message. She verbalized understanding. She says she usually gives Jean tylenol extra strength 500 mg 1-2 tablets at night time only. She will call her pharmacy and call back.  To Dr. Vance Perez, [facility]

## 2020-01-23 NOTE — TELEPHONE ENCOUNTER
Message noted. Since patient not having too much pain in the knee he probably does not need the diclofenac gel for now. If he needs something he can take Tylenol 325 mg 1 or 2 tablets 3 times a day.   I would go ahead with a swallowing test to give us add

## 2020-01-23 NOTE — TELEPHONE ENCOUNTER
Ericka Jesus called back. She was told by [facility] that their humana plan will not pay for diabetes equipment or supplies. They suggested calling [facility] or [facility] and they will bill medicare for it.   She called [facility] and they told her they need to have the r

## 2020-01-24 NOTE — TELEPHONE ENCOUNTER
Spoke to [place] - clarified Medicare coverage of DM testing supplies; She will find out which meter is covered and call us back;    Send in orders for daily testing of whatever she needs

## 2020-01-27 RX ORDER — BLOOD SUGAR DIAGNOSTIC
STRIP MISCELLANEOUS
Qty: 100 STRIP | Refills: 3 | Status: SHIPPED | OUTPATIENT
Start: 2020-01-27 | End: 2021-02-21

## 2020-01-27 RX ORDER — LANCETS
EACH MISCELLANEOUS
Qty: 100 EACH | Refills: 3 | Status: SHIPPED | OUTPATIENT
Start: 2020-01-27 | End: 2020-06-11

## 2020-01-27 RX ORDER — BLOOD-GLUCOSE METER
EACH MISCELLANEOUS
Qty: 1 KIT | Refills: 3 | Status: SHIPPED | OUTPATIENT
Start: 2020-01-27

## 2020-01-27 NOTE — TELEPHONE ENCOUNTER
[facility] is calling back with meter info Cleo  Ph. # [phone]   [facility] ph.  # [phone]  Routed to Rx

## 2020-01-30 ENCOUNTER — NURSE ONLY (OUTPATIENT)
Dept: INTERNAL MEDICINE CLINIC | Facility: CLINIC | Age: 85
End: 2020-01-30
Payer: MEDICARE

## 2020-01-30 PROCEDURE — G0463 HOSPITAL OUTPT CLINIC VISIT: HCPCS

## 2020-01-30 RX ORDER — LANCETS
EACH MISCELLANEOUS
Qty: 100 EACH | Refills: 3 | Status: SHIPPED | OUTPATIENT
Start: 2020-01-30 | End: 2021-11-23

## 2020-01-30 NOTE — PROGRESS NOTES
Jean Diane  : 1935 was seen for Diabetes Aware Consult. Date: 2020    Pt is s/p hosp. And recent HgA1C was elevated.  would like BS monitored to decide on pharmacotherapy.     Last Labs:    Glucose (mg/dL)   Date Value   2020 111

## 2020-01-31 ENCOUNTER — TELEPHONE (OUTPATIENT)
Dept: INTERNAL MEDICINE CLINIC | Facility: CLINIC | Age: 85
End: 2020-01-31

## 2020-01-31 NOTE — TELEPHONE ENCOUNTER
Diabetic Detailed Written Order Form received from [address] [place] [address] in blue folder

## 2020-02-03 NOTE — TELEPHONE ENCOUNTER
[facility] Diabetic Detailed Order Form completed and faxed to [place] @ [phone], conformation received. Original sent to scan.

## 2020-02-05 ENCOUNTER — HOSPITAL ENCOUNTER (OUTPATIENT)
Dept: GENERAL RADIOLOGY | Facility: HOSPITAL | Age: 85
Discharge: HOME | End: 2020-02-05
Attending: INTERNAL MEDICINE
Payer: MEDICARE

## 2020-02-05 DIAGNOSIS — R13.11 ORAL PHASE DYSPHAGIA: ICD-10-CM

## 2020-02-05 PROCEDURE — 92611 MOTION FLUOROSCOPY/SWALLOW: CPT

## 2020-02-05 PROCEDURE — 74230 X-RAY XM SWLNG FUNCJ C+: CPT | Performed by: INTERNAL MEDICINE

## 2020-02-05 NOTE — PATIENT INSTRUCTIONS
SWALLOWING INSTRUCTIONS  Recommended compensatory strategies:   Sit upright  Small sips  Small bites  Eat slowly  Alternate liquids and solids  No straw    Please follow up with Dr. Tamra Stanley.     Jenna Parks MA/CCC-SLP  Speech Language Pathologist  [place]

## 2020-02-05 NOTE — PROGRESS NOTES
ADULT VIDEOFLUOROSCOPIC SWALLOWING STUDY       ADULT VIDEOFLUOROSCOPIC SWALLOWING STUDY:   Referring Physician: Joycelyn Flores      Radiologist: Dr. Zenon Connor  Diagnosis: dysphagia    Date of Service: 2/5/2020     PATIENT SUMMARY   Chief Complaint: The patient has be of air trapping. 3. Mild bronchial wall thickening may reflect an element of bronchitis. 4. Dilatation of the main pulmonary artery trunk may relate to underlying pulmonary hypertension. 5. 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      6. Susp sips and alternating consistencies. May want to consider GI consultation or esophagram to further evaluate the esophagus. FCM category and level: Swallowing, 6  PLAN   Potential: Good    Diet Recommendations:  Solids: Regular  Liquids:  Thin    Recomm

## 2020-02-10 ENCOUNTER — HOSPITAL ENCOUNTER (OUTPATIENT)
Dept: CV DIAGNOSTICS | Age: 85
Discharge: HOME | End: 2020-02-10
Attending: INTERNAL MEDICINE
Payer: MEDICARE

## 2020-02-10 DIAGNOSIS — R06.00 DOE (DYSPNEA ON EXERTION): ICD-10-CM

## 2020-02-10 DIAGNOSIS — I10 HYPERTENSION, BENIGN: ICD-10-CM

## 2020-02-10 PROCEDURE — 93306 TTE W/DOPPLER COMPLETE: CPT | Performed by: INTERNAL MEDICINE

## 2020-02-10 RX ORDER — PANTOPRAZOLE SODIUM 40 MG/1
TABLET, DELAYED RELEASE ORAL
Qty: 90 TABLET | Refills: 3 | Status: SHIPPED | OUTPATIENT
Start: 2020-02-10 | End: 2021-01-30

## 2020-02-14 ENCOUNTER — OFFICE VISIT (OUTPATIENT)
Dept: INTERNAL MEDICINE CLINIC | Facility: CLINIC | Age: 85
End: 2020-02-14
Payer: MEDICARE

## 2020-02-14 VITALS
WEIGHT: 213 LBS | BODY MASS INDEX: 30.49 KG/M2 | DIASTOLIC BLOOD PRESSURE: 78 MMHG | SYSTOLIC BLOOD PRESSURE: 130 MMHG | OXYGEN SATURATION: 99 % | HEART RATE: 80 BPM | TEMPERATURE: 98 F | HEIGHT: 70 IN

## 2020-02-14 DIAGNOSIS — R13.12 OROPHARYNGEAL DYSPHAGIA: ICD-10-CM

## 2020-02-14 DIAGNOSIS — Z95.1 HX OF CABG: ICD-10-CM

## 2020-02-14 DIAGNOSIS — I10 HYPERTENSION, BENIGN: ICD-10-CM

## 2020-02-14 DIAGNOSIS — R91.8 ABNORMAL CT SCAN OF LUNG: ICD-10-CM

## 2020-02-14 DIAGNOSIS — I35.1 NONRHEUMATIC AORTIC VALVE INSUFFICIENCY: ICD-10-CM

## 2020-02-14 DIAGNOSIS — I82.412 DEEP VENOUS THROMBOSIS OF LEFT PROFUNDA FEMORIS VEIN (HCC): ICD-10-CM

## 2020-02-14 DIAGNOSIS — I63.512 CEREBROVASCULAR ACCIDENT (CVA) DUE TO OCCLUSION OF LEFT MIDDLE CEREBRAL ARTERY (HCC): Primary | ICD-10-CM

## 2020-02-14 DIAGNOSIS — Z95.828 HISTORY OF LEFT COMMON CAROTID ARTERY STENT PLACEMENT: ICD-10-CM

## 2020-02-14 DIAGNOSIS — Z98.890 HISTORY OF LEFT COMMON CAROTID ARTERY STENT PLACEMENT: ICD-10-CM

## 2020-02-14 DIAGNOSIS — Z98.890 HISTORY OF NISSEN FUNDOPLICATION: ICD-10-CM

## 2020-02-14 PROBLEM — E11.65 CONTROLLED TYPE 2 DIABETES MELLITUS WITH HYPERGLYCEMIA, WITHOUT LONG-TERM CURRENT USE OF INSULIN (HCC): Status: ACTIVE | Noted: 2020-02-14

## 2020-02-14 PROCEDURE — 99214 OFFICE O/P EST MOD 30 MIN: CPT | Performed by: INTERNAL MEDICINE

## 2020-02-14 PROCEDURE — G0463 HOSPITAL OUTPT CLINIC VISIT: HCPCS | Performed by: INTERNAL MEDICINE

## 2020-02-14 NOTE — PROGRESS NOTES
Jalil Garcia is a 80year old male   HPI:   Pt.presents for the following problems. Patient is here with his wife. He still has right knee pain. He has seen Dr. Sumeet Chen. He is gotten steroid injections and gel injections.   It is felt that patient stent placed by Dr. Yared Moya at [facility]. He is on dual antiplatelet therapy. Patient is diabetes. He has lost weight. He is reduced his caloric intake in terms of concentrated sweets. His hemoglobin A1c was 1.3 a month ago.   For now we will con Glycol 3350 Oral Powd Pack Take 17 g by mouth daily. • Cholecalciferol (VITAMIN D3) 1000 units Oral Cap Take 2 tablets by mouth daily. 30 capsule 0   • Vitamin B-12 1000 MCG Oral Tab Take 1,000 mcg by mouth daily.      • aspirin 81 MG Oral Tab Take 81 with biologic mesh robotic nissen fundoplication   • TOTAL KNEE REPLACEMENT        Family History   Problem Relation Age of Onset   • Cancer Father         lung cancer   • Heart Attack Mother    • Pulmonary Disease Brother    • Heart Attack Brother    • Ac [facility])  Doing well. History of CVA with left carotid artery stent in place. On dual antiplatelet therapy. Asymptomatic. 2. Hypertension, benign  Blood pressure under satisfactory control. Continue current therapy.     3. History of left common carotid

## 2020-02-17 RX ORDER — CLOPIDOGREL BISULFATE 75 MG/1
TABLET ORAL
Qty: 90 TABLET | Refills: 3 | Status: SHIPPED | OUTPATIENT
Start: 2020-02-17 | End: 2021-01-30

## 2020-02-20 ENCOUNTER — TELEPHONE (OUTPATIENT)
Dept: INTERNAL MEDICINE CLINIC | Facility: CLINIC | Age: 85
End: 2020-02-20

## 2020-02-20 NOTE — TELEPHONE ENCOUNTER
[address] called  Pt discharged from [facility] will go to [facility]   Please fax new order to [facility] where pt was going previously  Tasked to nursing

## 2020-02-27 RX ORDER — ATORVASTATIN CALCIUM 40 MG/1
TABLET, FILM COATED ORAL
Qty: 30 TABLET | Refills: 0 | Status: SHIPPED | OUTPATIENT
Start: 2020-02-27 | End: 2020-03-30

## 2020-03-12 RX ORDER — TAMSULOSIN HYDROCHLORIDE 0.4 MG/1
0.4 CAPSULE ORAL NIGHTLY
Qty: 90 CAPSULE | Refills: 3 | Status: SHIPPED | OUTPATIENT
Start: 2020-03-12 | End: 2021-03-05

## 2020-03-12 NOTE — TELEPHONE ENCOUNTER
Message noted. Yes I think he should take both since they were originally prescribed by a urologist.  Even though he does not see the urologist anymore I would refill both. Refill sent.

## 2020-03-12 NOTE — TELEPHONE ENCOUNTER
Please call pt wife, she is requesting a refill but has a question prior to refill for:  Tamsulosin   pt takes Finasteride is Tamsulosin needed also?    [facility]   Tasked to [employer]

## 2020-03-12 NOTE — TELEPHONE ENCOUNTER
The following prescription was reviewed, authorized, and electronically sent to the pharmacy. Requested Prescriptions     Signed Prescriptions Disp Refills   • tamsulosin (FLOMAX) cap 90 capsule 3     Sig: Take 1 capsule (0.4 mg total) by mouth nightly.

## 2020-03-12 NOTE — TELEPHONE ENCOUNTER
Spoke with Liliane Silver  She states finasteride was prescribed by Jean's urologist after his stroke. Jaen does not see a urologist anymore. He has been taking finasteride and tamsulosin together for a while. Should he keep taking both?   If so he needs refill for

## 2020-03-16 ENCOUNTER — TELEPHONE (OUTPATIENT)
Dept: PULMONOLOGY | Facility: CLINIC | Age: 85
End: 2020-03-16

## 2020-03-16 NOTE — TELEPHONE ENCOUNTER
PA verified, spoke with wife about rescheduling due to coronavirus containment efforts. Appointment rescheduled to 5/21/2020.

## 2020-03-24 RX ORDER — METOPROLOL TARTRATE 50 MG/1
TABLET, FILM COATED ORAL
Qty: 180 TABLET | Refills: 3 | Status: SHIPPED | OUTPATIENT
Start: 2020-03-24 | End: 2021-03-19

## 2020-03-30 RX ORDER — ATORVASTATIN CALCIUM 40 MG/1
TABLET, FILM COATED ORAL
Qty: 30 TABLET | Refills: 0 | Status: SHIPPED | OUTPATIENT
Start: 2020-03-30 | End: 2020-04-27 | Stop reason: SDUPTHER

## 2020-04-13 ENCOUNTER — TELEPHONE (OUTPATIENT)
Dept: INTERNAL MEDICINE CLINIC | Facility: CLINIC | Age: 85
End: 2020-04-13

## 2020-04-13 NOTE — TELEPHONE ENCOUNTER
Discussed with wife. Patient doing reasonably well. No acute complaints. He still has puffiness in his feet. Wife states may be \"a little more\" but nothing that seems to be very dramatic. He is eating well. Blood sugars fluctuate.   Last Friday he w

## 2020-04-28 RX ORDER — ATORVASTATIN CALCIUM 40 MG/1
40 TABLET, FILM COATED ORAL DAILY
Qty: 90 TABLET | Refills: 1 | Status: SHIPPED | OUTPATIENT
Start: 2020-04-28 | End: 2020-10-23

## 2020-04-29 ENCOUNTER — TELEPHONE (OUTPATIENT)
Dept: INTERNAL MEDICINE CLINIC | Facility: CLINIC | Age: 85
End: 2020-04-29

## 2020-04-29 NOTE — TELEPHONE ENCOUNTER
[facility] sent Diabetic Detailed Written Order for:  Glucose Test Strips  Lancets  Meter  Form placed in blue folder  Tasked to April Pemberton

## 2020-04-30 NOTE — TELEPHONE ENCOUNTER
[facility] Diabetic Detailed Order Form completed and faxed to [place] @ [phone], conformation received. Original sent to scan.

## 2020-05-04 ENCOUNTER — TELEPHONE (OUTPATIENT)
Dept: INTERNAL MEDICINE CLINIC | Facility: CLINIC | Age: 85
End: 2020-05-04

## 2020-05-04 NOTE — TELEPHONE ENCOUNTER
Wife called  Having trouble getting pt's glucose test strips refilled - - has only one strip left   Pharmacy advises the codes we faxed back are incorrecct and medicare will not cover rx

## 2020-05-04 NOTE — TELEPHONE ENCOUNTER
[facility] called asking that we fax CMN form to them as well for a faster turn around time. Fax it to [phone].

## 2020-05-05 NOTE — TELEPHONE ENCOUNTER
Form refaxed for 3rd time directly to store [phone], conformation received. Original in blue folder ( if needs to be refaxed).

## 2020-05-05 NOTE — TELEPHONE ENCOUNTER
[address] calling  Patient is out of test strips & lancets  Re faxing CMN form as they need sent back to process refills

## 2020-05-21 ENCOUNTER — OFFICE VISIT (OUTPATIENT)
Dept: PULMONOLOGY | Facility: CLINIC | Age: 85
End: 2020-05-21
Payer: MEDICARE

## 2020-05-21 ENCOUNTER — TELEPHONE (OUTPATIENT)
Dept: PULMONOLOGY | Facility: CLINIC | Age: 85
End: 2020-05-21

## 2020-05-21 VITALS
WEIGHT: 220 LBS | RESPIRATION RATE: 18 BRPM | OXYGEN SATURATION: 96 % | BODY MASS INDEX: 31.5 KG/M2 | HEIGHT: 70 IN | DIASTOLIC BLOOD PRESSURE: 66 MMHG | HEART RATE: 79 BPM | SYSTOLIC BLOOD PRESSURE: 120 MMHG

## 2020-05-21 DIAGNOSIS — J45.20 MILD INTERMITTENT ASTHMA WITHOUT COMPLICATION: Primary | ICD-10-CM

## 2020-05-21 PROCEDURE — 94010 BREATHING CAPACITY TEST: CPT | Performed by: INTERNAL MEDICINE

## 2020-05-21 PROCEDURE — 99204 OFFICE O/P NEW MOD 45 MIN: CPT | Performed by: INTERNAL MEDICINE

## 2020-05-21 PROCEDURE — 3078F DIAST BP <80 MM HG: CPT | Performed by: INTERNAL MEDICINE

## 2020-05-21 PROCEDURE — G0463 HOSPITAL OUTPT CLINIC VISIT: HCPCS | Performed by: INTERNAL MEDICINE

## 2020-05-21 PROCEDURE — 3008F BODY MASS INDEX DOCD: CPT | Performed by: INTERNAL MEDICINE

## 2020-05-21 PROCEDURE — 3074F SYST BP LT 130 MM HG: CPT | Performed by: INTERNAL MEDICINE

## 2020-05-21 RX ORDER — IPRATROPIUM BROMIDE AND ALBUTEROL SULFATE 2.5; .5 MG/3ML; MG/3ML
3 SOLUTION RESPIRATORY (INHALATION) 2 TIMES DAILY
Qty: 60 VIAL | Refills: 5 | Status: SHIPPED | OUTPATIENT
Start: 2020-05-21 | End: 2020-06-30

## 2020-05-21 NOTE — H&P
Referring Physician  Chanel Powers MD    Chief Complaint  Dyspnea, wheezing    History of Present Illness  Patient is a 80-year-old male who presents for evaluation of underlying dyspnea.   Wife states that patient has had increased dyspnea since December 2 total) by mouth nightly., Disp: 90 capsule, Rfl: 3  CLOPIDOGREL BISULFATE 75 MG Oral Tab, TAKE ONE TABLET BY MOUTH ONE TIME DAILY , Disp: 90 tablet, Rfl: 3  PANTOPRAZOLE SODIUM 40 MG Oral Tab EC, TAKE ONE TABLET BY MOUTH ONE TIME DAILY , Disp: 90 tablet, R embolism seen. Scattered bilateral atelectatic changes seen. Mild bronchial wall thickening seen. Pulmonary Function Testing  None available to review    Assessment  1. Dyspnea with exertion  2. Prior CVA  3.   Coronary artery disease    Plan  -Patie

## 2020-05-22 NOTE — TELEPHONE ENCOUNTER
Nika/ALEXANDRO states supplies must be added to nebulizer script. Dr Lilton Simmonds can you correct order from yesterday?

## 2020-05-23 ENCOUNTER — HOSPITAL ENCOUNTER (OUTPATIENT)
Dept: GENERAL RADIOLOGY | Facility: HOSPITAL | Age: 85
Discharge: HOME | End: 2020-05-23
Attending: INTERNAL MEDICINE
Payer: MEDICARE

## 2020-05-23 DIAGNOSIS — R13.10 DYSPHAGIA, UNSPECIFIED TYPE: ICD-10-CM

## 2020-05-23 PROCEDURE — 74220 X-RAY XM ESOPHAGUS 1CNTRST: CPT | Performed by: INTERNAL MEDICINE

## 2020-06-05 ENCOUNTER — TELEPHONE (OUTPATIENT)
Dept: INTERNAL MEDICINE CLINIC | Facility: CLINIC | Age: 85
End: 2020-06-05

## 2020-06-05 ENCOUNTER — OFFICE VISIT (OUTPATIENT)
Dept: INTERNAL MEDICINE CLINIC | Facility: CLINIC | Age: 85
End: 2020-06-05
Payer: MEDICARE

## 2020-06-05 ENCOUNTER — APPOINTMENT (OUTPATIENT)
Dept: LAB | Age: 85
End: 2020-06-05
Attending: INTERNAL MEDICINE
Payer: MEDICARE

## 2020-06-05 VITALS
OXYGEN SATURATION: 98 % | TEMPERATURE: 98 F | SYSTOLIC BLOOD PRESSURE: 142 MMHG | HEIGHT: 67 IN | BODY MASS INDEX: 32.33 KG/M2 | DIASTOLIC BLOOD PRESSURE: 80 MMHG | HEART RATE: 72 BPM | WEIGHT: 206 LBS

## 2020-06-05 DIAGNOSIS — R73.9 ELEVATED BLOOD SUGAR: ICD-10-CM

## 2020-06-05 DIAGNOSIS — I10 HYPERTENSION, BENIGN: ICD-10-CM

## 2020-06-05 DIAGNOSIS — Z98.890 HISTORY OF LEFT COMMON CAROTID ARTERY STENT PLACEMENT: ICD-10-CM

## 2020-06-05 DIAGNOSIS — Z95.828 HISTORY OF LEFT COMMON CAROTID ARTERY STENT PLACEMENT: ICD-10-CM

## 2020-06-05 DIAGNOSIS — Z95.1 HX OF CABG: ICD-10-CM

## 2020-06-05 DIAGNOSIS — I82.412 DEEP VENOUS THROMBOSIS OF LEFT PROFUNDA FEMORIS VEIN (HCC): ICD-10-CM
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
## 2020-06-05 DIAGNOSIS — R06.00 DYSPNEA, UNSPECIFIED TYPE: ICD-10-CM

## 2020-06-05 DIAGNOSIS — N39.498 OTHER URINARY INCONTINENCE: ICD-10-CM

## 2020-06-05 DIAGNOSIS — R91.8 ABNORMAL CT SCAN OF LUNG: ICD-10-CM

## 2020-06-05 DIAGNOSIS — I63.512 CEREBROVASCULAR ACCIDENT (CVA) DUE TO OCCLUSION OF LEFT MIDDLE CEREBRAL ARTERY (HCC): Primary | ICD-10-CM

## 2020-06-05 PROCEDURE — 81001 URINALYSIS AUTO W/SCOPE: CPT | Performed by: INTERNAL MEDICINE

## 2020-06-05 PROCEDURE — G0463 HOSPITAL OUTPT CLINIC VISIT: HCPCS | Performed by: INTERNAL MEDICINE

## 2020-06-05 PROCEDURE — 99214 OFFICE O/P EST MOD 30 MIN: CPT | Performed by: INTERNAL MEDICINE

## 2020-06-05 PROCEDURE — 83036 HEMOGLOBIN GLYCOSYLATED A1C: CPT

## 2020-06-05 PROCEDURE — 36415 COLL VENOUS BLD VENIPUNCTURE: CPT

## 2020-06-05 PROCEDURE — 80053 COMPREHEN METABOLIC PANEL: CPT

## 2020-06-05 RX ORDER — FUROSEMIDE 20 MG/1
TABLET ORAL
Qty: 30 TABLET | Refills: 5 | COMMUNITY
Start: 2020-06-05 | End: 2020-06-11

## 2020-06-05 NOTE — TELEPHONE ENCOUNTER
Reviewed labs with wife. Will start metformin just one 500 mg tablet a day. She will continue to watch his blood sugars 3 times a week before breakfast and before dinner. She will call in about 10 to 14 days with progress.

## 2020-06-05 NOTE — PROGRESS NOTES
Fannie Wells is a 80year old male. HPI:   Patient presents with:  Checkup: 2 mo f/u     Patient here for follow-up. Discussed with wife. Patient is always \"hungry\". He does snack a lot. He is drinking enough fluids. He does urinate frequently. was reviewed. Intermittent swallowing issues may be multifactorial secondary to dementia, mild oropharyngeal dysphasia, perhaps some esophageal dysmotility but no obstruction seen on esophagram.  Swallowing precautions reviewed.     Patient does have a his ill-defined, cerebrovascular disease 12/2018    Per patient wife, pt had stroke December 2018   • Ankle fracture, left    • CAD (coronary artery disease)     cardiologist dr. Daniel Max   • Calcaneal spur, left 04-    xray/readjust insole   • Coronary This is chronic. Very minimal however. MS: Patient had a fall about a month ago. He was in the garage. He missed step. He fell on his right hip. He needed help getting up. Wife did say that they saw his orthopedic physician Dr. Sarah Wiley.   X-ray

## 2020-06-11 RX ORDER — POTASSIUM CHLORIDE 750 MG/1
10 TABLET, FILM COATED, EXTENDED RELEASE ORAL DAILY
Qty: 30 TABLET | Refills: 3 | Status: SHIPPED | OUTPATIENT
Start: 2020-06-11 | End: 2021-03-19

## 2020-06-11 RX ORDER — FUROSEMIDE 20 MG/1
TABLET ORAL
Qty: 30 TABLET | Refills: 3 | Status: SHIPPED | OUTPATIENT
Start: 2020-06-11 | End: 2021-03-19

## 2020-06-11 NOTE — TELEPHONE ENCOUNTER
Spoke to  and wife---furosemide dose changed 6/5;  Now KCl with also only be BIW    Refill request is for a maintenance medication and has met the criteria specified in the Ambulatory Medication Refill Standing Order for eligibility, visits, laboratory

## 2020-06-30 RX ORDER — IPRATROPIUM BROMIDE AND ALBUTEROL SULFATE 2.5; .5 MG/3ML; MG/3ML
SOLUTION RESPIRATORY (INHALATION)
Qty: 180 ML | Refills: 0 | Status: SHIPPED | OUTPATIENT
Start: 2020-06-30 | End: 2020-07-27

## 2020-06-30 RX ORDER — IPRATROPIUM BROMIDE AND ALBUTEROL SULFATE 2.5; .5 MG/3ML; MG/3ML
SOLUTION RESPIRATORY (INHALATION)
Qty: 540 ML | Refills: 0 | OUTPATIENT
Start: 2020-06-30

## 2020-07-20 ENCOUNTER — TELEPHONE (OUTPATIENT)
Dept: INTERNAL MEDICINE CLINIC | Facility: CLINIC | Age: 85
End: 2020-07-20

## 2020-07-20 RX ORDER — FINASTERIDE 5 MG/1
5 TABLET, FILM COATED ORAL DAILY
Qty: 90 TABLET | Refills: 3 | Status: SHIPPED | OUTPATIENT
Start: 2020-07-20 | End: 2021-06-13

## 2020-07-20 NOTE — TELEPHONE ENCOUNTER
To Dr. Lacey Palomino to advise on taking over refill. Spoke with Giorgio Madrid ([place] per HIPPA) and confirmed patient is taking finasteride 5mg oral tab once daily and she would like rx sent to [facility]. I do not see a recent PSA in Epic labs, care everywhere or media. Last one found in 2012.

## 2020-07-20 NOTE — TELEPHONE ENCOUNTER
Message noted. I sent over refill.   Patient is 80years of age so probably will not do any more PSAs

## 2020-07-20 NOTE — TELEPHONE ENCOUNTER
Pt wife asking if  can fill this for pt. Also if  can do some prostate tests for pt so he wont have to see this other doctor.

## 2020-07-27 NOTE — TELEPHONE ENCOUNTER
Pt's wife  Nicole Valle states the IPRATROPIUM-ALBUTEROL 0.5-2.5 (3) MG/3ML Inhalation Solution is helping and is calling for a refill. ..  Please advise

## 2020-07-28 ENCOUNTER — TELEPHONE (OUTPATIENT)
Dept: PULMONOLOGY | Facility: CLINIC | Age: 85
End: 2020-07-28

## 2020-07-28 RX ORDER — IPRATROPIUM BROMIDE AND ALBUTEROL SULFATE 2.5; .5 MG/3ML; MG/3ML
SOLUTION RESPIRATORY (INHALATION)
Qty: 180 ML | Refills: 0 | Status: SHIPPED | OUTPATIENT
Start: 2020-07-28 | End: 2020-08-25

## 2020-07-28 RX ORDER — IPRATROPIUM BROMIDE AND ALBUTEROL SULFATE 2.5; .5 MG/3ML; MG/3ML
SOLUTION RESPIRATORY (INHALATION)
Qty: 180 ML | Refills: 3 | Status: SHIPPED | OUTPATIENT
Start: 2020-07-28

## 2020-07-28 NOTE — TELEPHONE ENCOUNTER
Miriam requesting Ipratropium - Albuterol to be sent to [facility] instead of [facility].  Please call. Thank you.     Current Outpatient Medications   Medication Sig Dispense Refill   • ipratropium-albuterol 0.5-2.5 (3) MG/3ML Inhalation Solution USE 1 VI

## 2020-07-29 NOTE — TELEPHONE ENCOUNTER
Spoke with wife Constanza Allen ([place] on file). Informed wife script was sent to [address] Wife verbalized understanding. Spoke with Sal Bernal at [address] [facility]. RN inquired if script for ipatropium- albuteril was received.  Sal Bernal confirmed script was rec

## 2020-07-31 RX ORDER — IPRATROPIUM BROMIDE AND ALBUTEROL SULFATE 2.5; .5 MG/3ML; MG/3ML
SOLUTION RESPIRATORY (INHALATION)
Qty: 540 ML | Refills: 0 | OUTPATIENT
Start: 2020-07-31

## 2020-08-13 ENCOUNTER — OFFICE VISIT (OUTPATIENT)
Dept: INTERNAL MEDICINE CLINIC | Facility: CLINIC | Age: 85
End: 2020-08-13
Payer: MEDICARE

## 2020-08-13 VITALS
TEMPERATURE: 97 F | SYSTOLIC BLOOD PRESSURE: 128 MMHG | HEIGHT: 67 IN | DIASTOLIC BLOOD PRESSURE: 84 MMHG | OXYGEN SATURATION: 98 % | WEIGHT: 207.63 LBS | HEART RATE: 88 BPM | BODY MASS INDEX: 32.59 KG/M2
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
## 2020-08-13 DIAGNOSIS — Z95.828 HISTORY OF LEFT COMMON CAROTID ARTERY STENT PLACEMENT: ICD-10-CM

## 2020-08-13 DIAGNOSIS — E11.9 CONTROLLED TYPE 2 DIABETES MELLITUS WITHOUT COMPLICATION, WITHOUT LONG-TERM CURRENT USE OF INSULIN (HCC): ICD-10-CM

## 2020-08-13 DIAGNOSIS — Z85.818 HISTORY OF SALIVARY GLAND CANCER: ICD-10-CM

## 2020-08-13 DIAGNOSIS — I10 HYPERTENSION, BENIGN: ICD-10-CM

## 2020-08-13 DIAGNOSIS — Z98.890 HISTORY OF LEFT COMMON CAROTID ARTERY STENT PLACEMENT: ICD-10-CM

## 2020-08-13 DIAGNOSIS — R22.0 MASS OF MANDIBLE: Primary | ICD-10-CM

## 2020-08-13 DIAGNOSIS — I63.512 CEREBROVASCULAR ACCIDENT (CVA) DUE TO OCCLUSION OF LEFT MIDDLE CEREBRAL ARTERY (HCC): ICD-10-CM

## 2020-08-13 PROCEDURE — G0463 HOSPITAL OUTPT CLINIC VISIT: HCPCS | Performed by: INTERNAL MEDICINE

## 2020-08-13 PROCEDURE — 99214 OFFICE O/P EST MOD 30 MIN: CPT | Performed by: INTERNAL MEDICINE

## 2020-08-13 NOTE — PROGRESS NOTES
Jacquie Roper is a 80year old male. HPI:   Patient presents with:  Checkup: Pt reports small bump beneath skin that at times is painful on left cheek  Fall: Pt's wife states pt fell while going up or down 1 step. Denies hitting head.  Pt fell backwards on IPRATROPIUM-ALBUTEROL 0.5-2.5 (3) MG/3ML Inhalation Solution USE 1 VIAL VIA NEBULIZER TWICE DAILY 180 mL 0   • finasteride 5 MG Oral Tab Take 1 tablet (5 mg total) by mouth daily.  90 tablet 3   • furosemide 20 MG Oral Tab TAKE ONE TABLET BY MOUTH ONE TIME 04-17-21007    xray/readjust insole   • Hearing impairment     bilateral hearing aids   • Hepatic cyst    • Hiatal hernia    • High blood pressure    • High cholesterol    • Internal hemorrhoids    • Malignant tumor of neck (HCC)    • Moderate aortic insuf edema  NEURO: Right facial droop from his stroke. Motor power equal in arms and legs. Gait stable with walker. ASSESSMENT AND PLAN:     1. Mass of mandible  Patient seems to have a possible mass over the left mandible area.   I cannot really define a d

## 2020-08-14 ENCOUNTER — TELEPHONE (OUTPATIENT)
Dept: INTERNAL MEDICINE CLINIC | Facility: CLINIC | Age: 85
End: 2020-08-14

## 2020-08-14 RX ORDER — BLOOD SUGAR DIAGNOSTIC
STRIP MISCELLANEOUS
Qty: 100 STRIP | Refills: 3 | Status: CANCELLED | OUTPATIENT
Start: 2020-08-14

## 2020-08-14 RX ORDER — BLOOD-GLUCOSE METER
EACH MISCELLANEOUS
Qty: 1 KIT | Refills: 3 | Status: CANCELLED | OUTPATIENT
Start: 2020-08-14

## 2020-08-14 RX ORDER — LANCING DEVICE/LANCETS
KIT MISCELLANEOUS
Qty: 1 KIT | Refills: 1 | Status: SHIPPED | OUTPATIENT
Start: 2020-08-14

## 2020-08-14 NOTE — TELEPHONE ENCOUNTER
LM on patient's home ([place] per HIPPA) informing him device sent in and to let us know if he didn't get everything he needed.

## 2020-08-14 NOTE — TELEPHONE ENCOUNTER
Spoke with Sid Lau ([place] per HIPAA) who reports the device she puts the lancet in is not working to puncture the skin, she needs a new one. Informed Sid Lau I would check with the pharmacy if this is included in a kit and call back.      Spoke with pharmacy who r

## 2020-08-14 NOTE — TELEPHONE ENCOUNTER
Pt spouse called  Pt plunger for insulin testing is not working  Does pt need RX for this?   Please call to advise  Tasked to nursing

## 2020-08-18 NOTE — TELEPHONE ENCOUNTER
[facility] Diabetic Detailed Order Form completed and faxed to [place] @ [phone], conformation received. Original sent to scan.

## 2020-08-18 NOTE — TELEPHONE ENCOUNTER
[facility] Detailed Written Order     Glucose Test Strips  Lancets   Meter  Lancet Device       Placed in blue folder

## 2020-08-21 ENCOUNTER — HOSPITAL ENCOUNTER (OUTPATIENT)
Dept: CT IMAGING | Age: 85
Discharge: HOME | End: 2020-08-21
Attending: INTERNAL MEDICINE
Payer: MEDICARE

## 2020-08-21 DIAGNOSIS — Z85.818 HISTORY OF SALIVARY GLAND CANCER: ICD-10-CM

## 2020-08-21 DIAGNOSIS — R22.0 MASS OF MANDIBLE: ICD-10-CM

## 2020-08-21 PROCEDURE — 70491 CT SOFT TISSUE NECK W/DYE: CPT | Performed by: INTERNAL MEDICINE

## 2020-08-25 ENCOUNTER — TELEPHONE (OUTPATIENT)
Dept: INTERNAL MEDICINE CLINIC | Facility: CLINIC | Age: 85
End: 2020-08-25

## 2020-08-25 DIAGNOSIS — E04.1 THYROID CYST: Primary | ICD-10-CM

## 2020-08-25 RX ORDER — IPRATROPIUM BROMIDE AND ALBUTEROL SULFATE 2.5; .5 MG/3ML; MG/3ML
SOLUTION RESPIRATORY (INHALATION)
Qty: 180 ML | Refills: 5 | Status: SHIPPED | OUTPATIENT
Start: 2020-08-25 | End: 2021-02-24

## 2020-08-25 NOTE — TELEPHONE ENCOUNTER
Discussed with wife. Reviewed CT scan findings. No obvious malignancy. He does have some cysts in his thyroid. He had this last year. We will go ahead and order thyroid ultrasound to confirm that there is been no changes. Lab order in place. She will call to help get appointments for both labs and thyroid ultrasound.

## 2020-09-14 ENCOUNTER — HOSPITAL ENCOUNTER (OUTPATIENT)
Dept: ULTRASOUND IMAGING | Age: 85
Discharge: HOME | End: 2020-09-14
Attending: INTERNAL MEDICINE
Payer: MEDICARE

## 2020-09-14 ENCOUNTER — LAB ENCOUNTER (OUTPATIENT)
Dept: LAB | Age: 85
End: 2020-09-14
Attending: INTERNAL MEDICINE
Payer: MEDICARE

## 2020-09-14 DIAGNOSIS — E11.9 CONTROLLED TYPE 2 DIABETES MELLITUS WITHOUT COMPLICATION, WITHOUT LONG-TERM CURRENT USE OF INSULIN (HCC): ICD-10-CM

## 2020-09-14 DIAGNOSIS — E04.1 THYROID CYST: ICD-10-CM

## 2020-09-14 LAB
ANION GAP SERPL CALC-SCNC: 5 MMOL/L
ANION GAP SERPL CALC-SCNC: 5 MMOL/L (ref 0–18)
BASOPHILS # BLD AUTO: 0.04 X10(3) UL (ref 0–0.2)
BASOPHILS NFR BLD AUTO: 0.6 %
BUN BLD-MCNC: 12 MG/DL (ref 7–18)
BUN SERPL-MCNC: 12 MG/DL
BUN/CREAT SERPL: 12.4
BUN/CREAT SERPL: 12.4 (ref 10–20)
CALCIUM BLD-MCNC: 9.4 MG/DL (ref 8.5–10.1)
CALCIUM SERPL-MCNC: 9.4 MG/DL
CHLORIDE SERPL-SCNC: 107 MMOL/L
CHLORIDE SERPL-SCNC: 107 MMOL/L (ref 98–112)
CHOLEST SERPL-MCNC: 116 MG/DL
CHOLEST SMN-MCNC: 116 MG/DL (ref ?–200)
CO2 SERPL-SCNC: 29 MMOL/L
CO2 SERPL-SCNC: 29 MMOL/L (ref 21–32)
CREAT BLD-MCNC: 0.97 MG/DL (ref 0.7–1.3)
CREAT SERPL-MCNC: 0.97 MG/DL
DEPRECATED RDW RBC AUTO: 48.9 FL (ref 35.1–46.3)
EOSINOPHIL # BLD AUTO: 0.05 X10(3) UL (ref 0–0.7)
EOSINOPHIL NFR BLD AUTO: 0.7 %
ERYTHROCYTE [DISTWIDTH] IN BLOOD BY AUTOMATED COUNT: 15 % (ref 11–15)
ERYTHROCYTE [DISTWIDTH] IN BLOOD BY AUTOMATED COUNT: 48.9 %
ERYTHROCYTE [DISTWIDTH] IN BLOOD: 15 %
EST. AVERAGE GLUCOSE BLD GHB EST-MCNC: 148 MG/DL (ref 68–126)
GLUCOSE BLD-MCNC: 128 MG/DL (ref 70–99)
GLUCOSE SERPL-MCNC: 128 MG/DL
HBA1C MFR BLD HPLC: 6.8 % (ref ?–5.7)
HCT VFR BLD AUTO: 45.8 % (ref 39–53)
HCT VFR BLD CALC: 45.8 %
HDLC SERPL-MCNC: 40 MG/DL
HDLC SERPL-MCNC: 40 MG/DL (ref 40–59)
HGB BLD-MCNC: 14.9 G/DL
HGB BLD-MCNC: 14.9 G/DL (ref 13–17.5)
IMM GRANULOCYTES # BLD AUTO: 0.04 X10(3) UL (ref 0–1)
IMM GRANULOCYTES NFR BLD: 0.6 %
LDLC SERPL CALC-MCNC: 59 MG/DL
LDLC SERPL CALC-MCNC: 59 MG/DL (ref ?–100)
LENGTH OF FAST TIME PATIENT: YES H
LENGTH OF FAST TIME PATIENT: YES H
LYMPHOCYTES # BLD AUTO: 0.99 X10(3) UL (ref 1–4)
LYMPHOCYTES NFR BLD AUTO: 14.7 %
MCH RBC QN AUTO: 28.8 PG
MCH RBC QN AUTO: 28.8 PG (ref 26–34)
MCHC RBC AUTO-ENTMCNC: 32.5 G/DL
MCHC RBC AUTO-ENTMCNC: 32.5 G/DL (ref 31–37)
MCV RBC AUTO: 88.4 FL
MCV RBC AUTO: 88.4 FL (ref 80–100)
MONOCYTES # BLD AUTO: 0.47 X10(3) UL (ref 0.1–1)
MONOCYTES NFR BLD AUTO: 7 %
NEUTROPHILS # BLD AUTO: 5.15 X10 (3) UL (ref 1.5–7.7)
NEUTROPHILS # BLD AUTO: 5.15 X10(3) UL (ref 1.5–7.7)
NEUTROPHILS NFR BLD AUTO: 76.4 %
NONHDLC SERPL-MCNC: 76 MG/DL
NONHDLC SERPL-MCNC: 76 MG/DL (ref ?–130)
OSMOLALITY SERPL CALC.SUM OF ELEC: 293 MOSM/KG (ref 275–295)
PATIENT FASTING Y/N/NP: YES
PATIENT FASTING Y/N/NP: YES
PLATELET # BLD AUTO: 197 10(3)UL (ref 150–450)
PLATELET # BLD: 197 K/MCL
POTASSIUM SERPL-SCNC: 4 MMOL/L
POTASSIUM SERPL-SCNC: 4 MMOL/L (ref 3.5–5.1)
RBC # BLD AUTO: 5.18 X10(6)UL (ref 3.8–5.8)
RBC # BLD: 5.18 10*6/UL
SODIUM SERPL-SCNC: 141 MMOL/L
SODIUM SERPL-SCNC: 141 MMOL/L (ref 136–145)
TRIGL SERPL-MCNC: 87 MG/DL
TRIGL SERPL-MCNC: 87 MG/DL (ref 30–149)
VLDLC SERPL CALC-MCNC: 17 MG/DL
VLDLC SERPL CALC-MCNC: 17 MG/DL (ref 0–30)
WBC # BLD AUTO: 6.7 X10(3) UL (ref 4–11)
WBC # BLD: 6.7 K/MCL

## 2020-09-14 PROCEDURE — 80061 LIPID PANEL: CPT

## 2020-09-14 PROCEDURE — 83036 HEMOGLOBIN GLYCOSYLATED A1C: CPT

## 2020-09-14 PROCEDURE — 36415 COLL VENOUS BLD VENIPUNCTURE: CPT

## 2020-09-14 PROCEDURE — 80048 BASIC METABOLIC PNL TOTAL CA: CPT

## 2020-09-14 PROCEDURE — 76536 US EXAM OF HEAD AND NECK: CPT | Performed by: INTERNAL MEDICINE

## 2020-09-14 PROCEDURE — 85025 COMPLETE CBC W/AUTO DIFF WBC: CPT

## 2020-09-18 ENCOUNTER — TELEPHONE (OUTPATIENT)
Dept: INTERNAL MEDICINE CLINIC | Facility: CLINIC | Age: 85
End: 2020-09-18

## 2020-09-18 NOTE — TELEPHONE ENCOUNTER
Ericka Jesus is calling to get the results of Jean's U/s & labs ph.  # [phone]  Routed to clinical

## 2020-09-20 NOTE — TELEPHONE ENCOUNTER
Please let patient's wife know that thyroid ultrasound was good. He has 3 nodules in his right thyroid lobe, one on the left and 1 in the middle collection connecting right and left lobes but these are all stable. No changes in growth.   The radiologist r

## 2020-09-21 NOTE — TELEPHONE ENCOUNTER
S/w spouse Trinh Dejesus and relayed MDs message-verbalized understanding. Spouse requested that patient come in to receive the flu and shingles vaccines. Per our records patient received zostavax, but not shingrix.  Spouse confirmed that he has not received sh

## 2020-09-21 NOTE — TELEPHONE ENCOUNTER
Please call patient and let her know that I would prefer him to get the flu shot first.  I hesitate to give 2 different vaccines on the same day so that if any side effects occur we know which one is given the side effects.   For now just come in and get th

## 2020-10-07 ENCOUNTER — NURSE ONLY (OUTPATIENT)
Dept: INTERNAL MEDICINE CLINIC | Facility: CLINIC | Age: 85
End: 2020-10-07
Payer: MEDICARE

## 2020-10-07 DIAGNOSIS — Z23 NEED FOR VACCINATION: ICD-10-CM

## 2020-10-07 DIAGNOSIS — Z23 NEED FOR IMMUNIZATION AGAINST INFLUENZA: Primary | ICD-10-CM

## 2020-10-07 PROCEDURE — G0008 ADMIN INFLUENZA VIRUS VAC: HCPCS | Performed by: INTERNAL MEDICINE

## 2020-10-07 PROCEDURE — 90662 IIV NO PRSV INCREASED AG IM: CPT | Performed by: INTERNAL MEDICINE

## 2020-10-21 ENCOUNTER — TELEPHONE (OUTPATIENT)
Dept: INTERNAL MEDICINE CLINIC | Facility: CLINIC | Age: 85
End: 2020-10-21

## 2020-10-21 NOTE — TELEPHONE ENCOUNTER
Message noted. Thank you. I also do not see any documentation of the first Shingrix vaccine in our office.

## 2020-10-21 NOTE — TELEPHONE ENCOUNTER
Patient was on schedule for shingles vaccine # 2 today. Spoke with patient and his wife, Gwenlyn Lanes, as I only saw a zostavax in 2012 but no shingrix #1. Gwenlyn Lanes reports patient got his first vaccine here but it was not within the 6 month time frame (2nd shingrix given 2-6 months after first). I asked patient if he had verified with his insurance that he can receive it at the office. Explained to patient and his wife that insurance will only cover at pharmacy OR office and if it is given at the wrong place the cost per vaccine is over $400. Gwenlyn Lanes reports [facility]. Apologized for the inconvenience of patient coming in today.      FYI to Dr. Patrick Jovel

## 2020-10-23 RX ORDER — ATORVASTATIN CALCIUM 40 MG/1
TABLET, FILM COATED ORAL
Qty: 90 TABLET | Refills: 0 | Status: SHIPPED | OUTPATIENT
Start: 2020-10-23 | End: 2021-01-26 | Stop reason: SDUPTHER

## 2021-01-22 ENCOUNTER — TELEPHONE (OUTPATIENT)
Dept: INTERNAL MEDICINE CLINIC | Facility: CLINIC | Age: 86
End: 2021-01-22

## 2021-01-22 NOTE — TELEPHONE ENCOUNTER
Wife called  Pt is due for 2nd shingles vaccination   can he get that & the Covid vaccine at the same time    Please call Mrs Destini James to advise [phone]

## 2021-01-25 DIAGNOSIS — Z23 NEED FOR VACCINATION: ICD-10-CM

## 2021-01-25 RX ORDER — ATORVASTATIN CALCIUM 40 MG/1
TABLET, FILM COATED ORAL
Qty: 90 TABLET | Refills: 0 | OUTPATIENT
Start: 2021-01-25

## 2021-01-26 ENCOUNTER — TELEPHONE (OUTPATIENT)
Dept: CARDIOLOGY | Age: 86
End: 2021-01-26

## 2021-01-26 RX ORDER — ATORVASTATIN CALCIUM 40 MG/1
40 TABLET, FILM COATED ORAL DAILY
Qty: 90 TABLET | Refills: 0 | Status: SHIPPED | OUTPATIENT
Start: 2021-01-26 | End: 2021-04-26

## 2021-01-26 NOTE — TELEPHONE ENCOUNTER
Called Mr &  Luis F number and line was busy no vm could be left. Called 2x .  Nursing to f/u 1/26/21

## 2021-01-26 NOTE — TELEPHONE ENCOUNTER
Spoke to [facility] and based off of [facility] patients are not to get any other vaccines two weeks prior to the initial covid vaccine and 2 weeks after covid vaccine.

## 2021-01-28 ENCOUNTER — IMMUNIZATION (OUTPATIENT)
Dept: LAB | Age: 86
End: 2021-01-28
Attending: HOSPITALIST
Payer: MEDICARE

## 2021-01-28 DIAGNOSIS — Z23 NEED FOR VACCINATION: Primary | ICD-10-CM

## 2021-01-28 PROCEDURE — 0001A SARSCOV2 VAC 30MCG/0.3ML IM: CPT

## 2021-01-30 RX ORDER — PANTOPRAZOLE SODIUM 40 MG/1
TABLET, DELAYED RELEASE ORAL
Qty: 90 TABLET | Refills: 3 | Status: SHIPPED | OUTPATIENT
Start: 2021-01-30 | End: 2022-01-24

## 2021-01-30 RX ORDER — CLOPIDOGREL BISULFATE 75 MG/1
TABLET ORAL
Qty: 90 TABLET | Refills: 3 | Status: SHIPPED | OUTPATIENT
Start: 2021-01-30

## 2021-02-04 NOTE — TELEPHONE ENCOUNTER
Spoke to patient and patients wife (speaker) patient stated that he was made aware and he had no other questions or concerns.

## 2021-02-15 ENCOUNTER — OFFICE VISIT (OUTPATIENT)
Dept: INTERNAL MEDICINE CLINIC | Facility: CLINIC | Age: 86
End: 2021-02-15
Payer: MEDICARE

## 2021-02-15 ENCOUNTER — LAB ENCOUNTER (OUTPATIENT)
Dept: LAB | Age: 86
End: 2021-02-15
Attending: INTERNAL MEDICINE
Payer: MEDICARE

## 2021-02-15 ENCOUNTER — TELEPHONE (OUTPATIENT)
Dept: INTERNAL MEDICINE CLINIC | Facility: CLINIC | Age: 86
End: 2021-02-15

## 2021-02-15 VITALS
DIASTOLIC BLOOD PRESSURE: 64 MMHG | HEART RATE: 72 BPM | BODY MASS INDEX: 33.02 KG/M2 | HEIGHT: 67 IN | SYSTOLIC BLOOD PRESSURE: 122 MMHG | OXYGEN SATURATION: 98 % | TEMPERATURE: 98 F | WEIGHT: 210.38 LBS

## 2021-02-15 DIAGNOSIS — I82.412 DEEP VENOUS THROMBOSIS OF LEFT PROFUNDA FEMORIS VEIN (HCC): ICD-10-CM

## 2021-02-15 DIAGNOSIS — I63.512 CEREBROVASCULAR ACCIDENT (CVA) DUE TO OCCLUSION OF LEFT MIDDLE CEREBRAL ARTERY (HCC): ICD-10-CM

## 2021-02-15 DIAGNOSIS — E11.9 DIABETES MELLITUS WITHOUT COMPLICATION (HCC): Primary | ICD-10-CM

## 2021-02-15 DIAGNOSIS — Z98.890 HISTORY OF LEFT COMMON CAROTID ARTERY STENT PLACEMENT: ICD-10-CM

## 2021-02-15 DIAGNOSIS — Z00.00 MEDICARE ANNUAL WELLNESS VISIT, INITIAL: Primary | ICD-10-CM

## 2021-02-15 DIAGNOSIS — E04.2 MULTIPLE THYROID NODULES: ICD-10-CM

## 2021-02-15 DIAGNOSIS — Z00.00 ROUTINE HEALTH MAINTENANCE: ICD-10-CM

## 2021-02-15 DIAGNOSIS — Z95.1 HX OF CABG: ICD-10-CM

## 2021-02-15 DIAGNOSIS — I10 HYPERTENSION, BENIGN: ICD-10-CM

## 2021-02-15 DIAGNOSIS — E11.9 CONTROLLED TYPE 2 DIABETES MELLITUS WITHOUT COMPLICATION, WITHOUT LONG-TERM CURRENT USE OF INSULIN (HCC): ICD-10-CM

## 2021-02-15 DIAGNOSIS — Z95.828 HISTORY OF LEFT COMMON CAROTID ARTERY STENT PLACEMENT: ICD-10-CM

## 2021-02-15 DIAGNOSIS — Z85.818 HISTORY OF SALIVARY GLAND CANCER: ICD-10-CM

## 2021-02-15 DIAGNOSIS — I35.1 NONRHEUMATIC AORTIC VALVE INSUFFICIENCY: ICD-10-CM

## 2021-02-15 LAB
ANION GAP SERPL CALC-SCNC: 2 MMOL/L
ANION GAP SERPL CALC-SCNC: 2 MMOL/L (ref 0–18)
BUN BLD-MCNC: 13 MG/DL (ref 7–18)
BUN SERPL-MCNC: 13 MG/DL
BUN/CREAT SERPL: 14.9
BUN/CREAT SERPL: 14.9 (ref 10–20)
CALCIUM BLD-MCNC: 9.5 MG/DL (ref 8.5–10.1)
CALCIUM SERPL-MCNC: 9.5 MG/DL
CHLORIDE SERPL-SCNC: 109 MMOL/L
CHLORIDE SERPL-SCNC: 109 MMOL/L (ref 98–112)
CHOLEST SERPL-MCNC: 116 MG/DL
CHOLEST SMN-MCNC: 116 MG/DL (ref ?–200)
CO2 SERPL-SCNC: 31 MMOL/L
CO2 SERPL-SCNC: 31 MMOL/L (ref 21–32)
CREAT BLD-MCNC: 0.87 MG/DL
CREAT SERPL-MCNC: 0.87 MG/DL
EST. AVERAGE GLUCOSE BLD GHB EST-MCNC: 143 MG/DL (ref 68–126)
ESTIMATED AVERAGE GLUCOSE: 143
GLUCOSE BLD-MCNC: 108 MG/DL (ref 70–99)
GLUCOSE SERPL-MCNC: 108 MG/DL
HBA1C MFR BLD HPLC: 6.6 % (ref ?–5.7)
HBA1C MFR BLD: 6.6 %
HDLC SERPL-MCNC: 44 MG/DL
HDLC SERPL-MCNC: 44 MG/DL (ref 40–59)
LDLC SERPL CALC-MCNC: 53 MG/DL
LDLC SERPL CALC-MCNC: 53 MG/DL (ref ?–100)
LENGTH OF FAST TIME PATIENT: NO H
LENGTH OF FAST TIME PATIENT: NO H
NONHDLC SERPL-MCNC: 72 MG/DL
NONHDLC SERPL-MCNC: 72 MG/DL (ref ?–130)
OSMOLALITY SERPL CALC.SUM OF ELEC: 295 MOSM/KG (ref 275–295)
PATIENT FASTING Y/N/NP: NO
PATIENT FASTING Y/N/NP: NO
POTASSIUM SERPL-SCNC: 4.1 MMOL/L
POTASSIUM SERPL-SCNC: 4.1 MMOL/L (ref 3.5–5.1)
SODIUM SERPL-SCNC: 142 MMOL/L
SODIUM SERPL-SCNC: 142 MMOL/L (ref 136–145)
TRIGL SERPL-MCNC: 95 MG/DL
TRIGL SERPL-MCNC: 95 MG/DL (ref 30–149)
TSH SERPL-ACNC: 1.76 MCUNITS/ML
TSI SER-ACNC: 1.76 MIU/ML (ref 0.36–3.74)
VIT B12 SERPL-MCNC: 1297 PG/ML (ref 193–986)
VLDLC SERPL CALC-MCNC: 19 MG/DL
VLDLC SERPL CALC-MCNC: 19 MG/DL (ref 0–30)

## 2021-02-15 PROCEDURE — 80061 LIPID PANEL: CPT

## 2021-02-15 PROCEDURE — 99214 OFFICE O/P EST MOD 30 MIN: CPT | Performed by: INTERNAL MEDICINE

## 2021-02-15 PROCEDURE — 82607 VITAMIN B-12: CPT

## 2021-02-15 PROCEDURE — 36415 COLL VENOUS BLD VENIPUNCTURE: CPT

## 2021-02-15 PROCEDURE — 80048 BASIC METABOLIC PNL TOTAL CA: CPT

## 2021-02-15 PROCEDURE — 83036 HEMOGLOBIN GLYCOSYLATED A1C: CPT

## 2021-02-15 PROCEDURE — 84443 ASSAY THYROID STIM HORMONE: CPT

## 2021-02-15 NOTE — TELEPHONE ENCOUNTER
Wife, Barney Emery, called and relayed Dr Ousmane Wallace message. Barney Emery verbalized understanding with no further questions noted.

## 2021-02-15 NOTE — PROGRESS NOTES
Dany Morelos is a 80year old male. HPI:   No chief complaint on file. Jean comes in for Medicare annual wellness visit    He does have diabetes. On therapy. He did bring his blood sugars in.   They range from what appeared to be a low of 10 7 in the is felt to be cured. He had an ultrasound of the thyroid September 2020 that showed stable appearance of multiple bilateral thyroid nodules. Follow-up 1 year i.e. September 2021.     He had a CT scan of his neck for what was thought by the wife to be eval TWICE DAILY WITH MEALS 180 tablet 3   • tamsulosin (FLOMAX) cap Take 1 capsule (0.4 mg total) by mouth nightly.  90 capsule 3   • Accu-Chek Soft Touch Lancets Does not apply Misc Test daily 100 each 3   • Blood Glucose Monitoring Suppl (ACCU-CHEK MONAE PLUS HEALTH:  feels well otherwise  RESPIRATORY:  Voices no shortness of breath with exertion or cough  CARDIOVASCULAR:  Voices no chest pain on exertion or shortness of breath  GI:   Voices no abdominal pain or changes of bowels   :Viices no urning or freque valve insufficiency  Asymptomatic. No new symptoms. 10. Multiple thyroid nodules  Stable. Update thyroid ultrasound this year. 11. Routine health maintenance  Patient did have his first Covid vaccination. He has had flu vaccine.   He had Pneumovax

## 2021-02-18 ENCOUNTER — IMMUNIZATION (OUTPATIENT)
Dept: LAB | Age: 86
End: 2021-02-18
Attending: HOSPITALIST
Payer: MEDICARE

## 2021-02-18 DIAGNOSIS — Z23 NEED FOR VACCINATION: Primary | ICD-10-CM

## 2021-02-18 PROCEDURE — 0002A SARSCOV2 VAC 30MCG/0.3ML IM: CPT

## 2021-02-21 RX ORDER — BLOOD SUGAR DIAGNOSTIC
STRIP MISCELLANEOUS
Qty: 100 STRIP | Refills: 3 | Status: SHIPPED | OUTPATIENT
Start: 2021-02-21

## 2021-02-23 NOTE — TELEPHONE ENCOUNTER
Current Outpatient Medications   Medication Sig Dispense Refill   • IPRATROPIUM-ALBUTEROL 0.5-2.5 (3) MG/3ML Inhalation Solution USE 1 VIAL VIA NEBULIZER TWICE DAILY 180 mL 5

## 2021-02-25 RX ORDER — IPRATROPIUM BROMIDE AND ALBUTEROL SULFATE 2.5; .5 MG/3ML; MG/3ML
SOLUTION RESPIRATORY (INHALATION)
COMMUNITY
Start: 2020-07-28

## 2021-02-25 RX ORDER — POTASSIUM CHLORIDE 750 MG/1
1 TABLET, FILM COATED, EXTENDED RELEASE ORAL DAILY
COMMUNITY
Start: 2020-12-03

## 2021-02-26 RX ORDER — IPRATROPIUM BROMIDE AND ALBUTEROL SULFATE 2.5; .5 MG/3ML; MG/3ML
SOLUTION RESPIRATORY (INHALATION)
Qty: 180 ML | Refills: 5 | Status: SHIPPED | OUTPATIENT
Start: 2021-02-26 | End: 2021-04-20

## 2021-03-03 ENCOUNTER — TELEPHONE (OUTPATIENT)
Dept: INTERNAL MEDICINE CLINIC | Facility: CLINIC | Age: 86
End: 2021-03-03

## 2021-03-03 NOTE — TELEPHONE ENCOUNTER
Message noted. He should NOT take Aleve, ibuprofen, Motrin or Advil. He is limited to topicals like Aspercreme or Voltaren gel both OTC.

## 2021-03-03 NOTE — TELEPHONE ENCOUNTER
To Dr. Babin Hard----    To address upon return. Wife is calling to report patient has had bilateral knee pain \"for years. \" States he is due for cortisone injections but does not have an appointment until next week.  She is inquiring if patient can take Aleve, but

## 2021-03-03 NOTE — TELEPHONE ENCOUNTER
Spoke with Karlee Ly ([place] per HIPAA) and relayed MD message, she verbalizes understanding. She patient had 2nd COVID vaccine on 2/18/21 and is wondering when it is safe to get shingrix.  Advised to wait at least 2 weeks after COVID vaccine before getting the

## 2021-03-03 NOTE — TELEPHONE ENCOUNTER
Message noted. I found on the Internet recommendations by the [employer] the following: For Pfizer vaccinations- wait at least 7 days after second dose. For Moderna vaccinations- wait at least 14 days after second dose.

## 2021-03-03 NOTE — TELEPHONE ENCOUNTER
Wife, Spike Do, is calling to leave a message for Dr Bill Gutierrez or nursing. Spike Do states the patient is having lots of pain in his knee at this time. Spike Do is wondering if patient can take aleve to help with the pain.  Spike Do is calling to get the okay from nursing

## 2021-03-05 RX ORDER — TAMSULOSIN HYDROCHLORIDE 0.4 MG/1
CAPSULE ORAL
Qty: 90 CAPSULE | Refills: 3 | Status: SHIPPED | OUTPATIENT
Start: 2021-03-05

## 2021-03-08 ENCOUNTER — OFFICE VISIT (OUTPATIENT)
Dept: CARDIOLOGY | Age: 86
End: 2021-03-08

## 2021-03-08 ENCOUNTER — TELEPHONE (OUTPATIENT)
Dept: INTERNAL MEDICINE CLINIC | Facility: CLINIC | Age: 86
End: 2021-03-08

## 2021-03-08 ENCOUNTER — TELEPHONE (OUTPATIENT)
Dept: CARDIOLOGY | Age: 86
End: 2021-03-08

## 2021-03-08 VITALS
DIASTOLIC BLOOD PRESSURE: 70 MMHG | OXYGEN SATURATION: 98 % | HEART RATE: 76 BPM | SYSTOLIC BLOOD PRESSURE: 132 MMHG | WEIGHT: 210 LBS | BODY MASS INDEX: 29.4 KG/M2 | HEIGHT: 71 IN

## 2021-03-08 DIAGNOSIS — I25.119 ATHEROSCLEROSIS OF NATIVE CORONARY ARTERY OF NATIVE HEART WITH ANGINA PECTORIS (CMD): ICD-10-CM

## 2021-03-08 DIAGNOSIS — E78.49 OTHER HYPERLIPIDEMIA: ICD-10-CM

## 2021-03-08 DIAGNOSIS — E78.00 PURE HYPERCHOLESTEROLEMIA: ICD-10-CM

## 2021-03-08 DIAGNOSIS — I35.1 NONRHEUMATIC AORTIC VALVE INSUFFICIENCY: Primary | ICD-10-CM

## 2021-03-08 PROBLEM — I25.10 ATHEROSCLEROSIS OF NATIVE CORONARY ARTERY OF NATIVE HEART WITHOUT ANGINA PECTORIS: Status: RESOLVED | Noted: 2019-06-27 | Resolved: 2021-03-08

## 2021-03-08 PROCEDURE — 99214 OFFICE O/P EST MOD 30 MIN: CPT | Performed by: INTERNAL MEDICINE

## 2021-03-08 RX ORDER — FUROSEMIDE 20 MG/1
1 TABLET ORAL DAILY
COMMUNITY
Start: 2021-02-19

## 2021-03-08 SDOH — HEALTH STABILITY: MENTAL HEALTH: HOW OFTEN DO YOU HAVE A DRINK CONTAINING ALCOHOL?: NEVER

## 2021-03-08 ASSESSMENT — PATIENT HEALTH QUESTIONNAIRE - PHQ9
SUM OF ALL RESPONSES TO PHQ9 QUESTIONS 1 AND 2: 0
1. LITTLE INTEREST OR PLEASURE IN DOING THINGS: NOT AT ALL
CLINICAL INTERPRETATION OF PHQ2 SCORE: NO FURTHER SCREENING NEEDED
CLINICAL INTERPRETATION OF PHQ9 SCORE: NO FURTHER SCREENING NEEDED
2. FEELING DOWN, DEPRESSED OR HOPELESS: NOT AT ALL
SUM OF ALL RESPONSES TO PHQ9 QUESTIONS 1 AND 2: 0

## 2021-03-08 NOTE — TELEPHONE ENCOUNTER
Message noted. I think the vitamin D can be 1000 units EVERY day. We can tell  wife that seems like a lot but that is usual dosage recommended. I am not quite sure how it got to be once a week. No harm done however.

## 2021-03-08 NOTE — TELEPHONE ENCOUNTER
Please advise - called spouse per hipaa who states patient is taking 1000 Units vitamin D weekly  And she wants to know what dose  DR. PECK wants patient to take -to DR. PECK

## 2021-03-08 NOTE — TELEPHONE ENCOUNTER
Spoke to patient's wife, Maria Del Carmen Rawls (ok per HIPAA) and relayed MD message, she verbalized understanding.

## 2021-03-10 ENCOUNTER — ANCILLARY PROCEDURE (OUTPATIENT)
Dept: CARDIOLOGY | Age: 86
End: 2021-03-10
Attending: INTERNAL MEDICINE

## 2021-03-10 VITALS — HEIGHT: 71 IN | WEIGHT: 210 LBS | BODY MASS INDEX: 29.4 KG/M2

## 2021-03-10 DIAGNOSIS — E78.00 PURE HYPERCHOLESTEROLEMIA: ICD-10-CM

## 2021-03-10 DIAGNOSIS — I35.1 NONRHEUMATIC AORTIC VALVE INSUFFICIENCY: ICD-10-CM

## 2021-03-10 DIAGNOSIS — E78.49 OTHER HYPERLIPIDEMIA: ICD-10-CM

## 2021-03-10 DIAGNOSIS — I25.119 ATHEROSCLEROSIS OF NATIVE CORONARY ARTERY OF NATIVE HEART WITH ANGINA PECTORIS (CMD): ICD-10-CM

## 2021-03-10 LAB
LV EF: 63 %
STRESS POST EXERCISE DUR SEC: 38 SEC
STRESS TARGET HR: 135 BPM

## 2021-03-10 PROCEDURE — 78452 HT MUSCLE IMAGE SPECT MULT: CPT | Performed by: INTERNAL MEDICINE

## 2021-03-10 PROCEDURE — A9502 TC99M TETROFOSMIN: HCPCS | Performed by: INTERNAL MEDICINE

## 2021-03-10 PROCEDURE — 93015 CV STRESS TEST SUPVJ I&R: CPT | Performed by: INTERNAL MEDICINE

## 2021-03-10 RX ORDER — REGADENOSON 0.08 MG/ML
0.4 INJECTION, SOLUTION INTRAVENOUS ONCE
Status: COMPLETED | OUTPATIENT
Start: 2021-03-10 | End: 2021-03-10

## 2021-03-10 RX ADMIN — REGADENOSON 0.4 MG: 0.08 INJECTION, SOLUTION INTRAVENOUS at 11:20

## 2021-03-10 ASSESSMENT — EXERCISE STRESS TEST
PEAK_HR: 90
COMMENTS: 4 MINUTE RECOVERY
PEAK_BP: 122/58
PEAK_RPP: 11088
STOPPAGE_REASON: PROTOCOL COMPLETE
PEAK_RPP: 8174
PEAK_RPP: 8778
STRESS_SYMPTOMS: DYSPNEA
PEAK_RPP: 9900
PEAK_BP: 126/66
STAGE_CATEGORIES: RECOVERY 3
PEAK_HR: 77
PEAK_HR: 67
STAGE_CATEGORIES: 1
PEAK_BP: 102/60
PEAK_BP: 114/62
PEAK_HR: 88
PEAK_HR: 88
STAGE_CATEGORIES: RECOVERY 2
PEAK_BP: 110/64
PEAK_HR: 70
COMMENTS: 1 MINUTE RECOVERY
STAGE_CATEGORIES: RECOVERY 1
COMMENTS: 20 SECOND RECOVERY
PEAK_RPP: 8976
STAGE_CATEGORIES: RESTING
STAGE_CATEGORIES: RECOVERY 0

## 2021-03-12 ENCOUNTER — TELEPHONE (OUTPATIENT)
Dept: CARDIOLOGY | Age: 86
End: 2021-03-12

## 2021-03-12 ENCOUNTER — LAB ENCOUNTER (OUTPATIENT)
Dept: LAB | Age: 86
End: 2021-03-12
Attending: INTERNAL MEDICINE
Payer: MEDICARE

## 2021-03-12 DIAGNOSIS — I35.1 AORTIC VALVE INSUFFICIENCY: Primary | ICD-10-CM

## 2021-03-12 DIAGNOSIS — E78.49 FAMILIAL COMBINED HYPERLIPIDEMIA: ICD-10-CM

## 2021-03-12 DIAGNOSIS — I35.1 AORTIC INCOMPETENCE: ICD-10-CM

## 2021-03-12 LAB
ANION GAP SERPL CALC-SCNC: 5 MMOL/L (ref 0–18)
BUN BLD-MCNC: 16 MG/DL (ref 7–18)
BUN SERPL-MCNC: 16 MG/DL
BUN/CREAT SERPL: 15.7 (ref 10–20)
CALCIUM BLD-MCNC: 8.9 MG/DL (ref 8.5–10.1)
CALCIUM SERPL-MCNC: 8.9 MG/DL
CHLORIDE SERPL-SCNC: 105 MMOL/L
CHLORIDE SERPL-SCNC: 105 MMOL/L (ref 98–112)
CO2 SERPL-SCNC: 30 MMOL/L (ref 21–32)
CREAT BLD-MCNC: 1.02 MG/DL
CREAT SERPL-MCNC: 1.02 MG/DL
GLUCOSE BLD-MCNC: 150 MG/DL (ref 70–99)
GLUCOSE SERPL-MCNC: 150 MG/DL
OSMOLALITY SERPL CALC.SUM OF ELEC: 294 MOSM/KG (ref 275–295)
PATIENT FASTING Y/N/NP: NO
POTASSIUM SERPL-SCNC: 3.7 MMOL/L
POTASSIUM SERPL-SCNC: 3.7 MMOL/L (ref 3.5–5.1)
SODIUM SERPL-SCNC: 140 MMOL/L
SODIUM SERPL-SCNC: 140 MMOL/L (ref 136–145)

## 2021-03-12 PROCEDURE — 80048 BASIC METABOLIC PNL TOTAL CA: CPT

## 2021-03-12 PROCEDURE — 36415 COLL VENOUS BLD VENIPUNCTURE: CPT

## 2021-03-15 ENCOUNTER — CLINICAL ABSTRACT (OUTPATIENT)
Dept: CARDIOLOGY | Age: 86
End: 2021-03-15

## 2021-03-17 ENCOUNTER — TELEPHONE (OUTPATIENT)
Dept: PULMONOLOGY | Facility: CLINIC | Age: 86
End: 2021-03-17

## 2021-03-17 NOTE — TELEPHONE ENCOUNTER
Prior Authorization request for;    •  ipratropium-albuterol 0.5-2.5 (3) MG/3ML Inhalation Solution, USE 1 VIAL VIA NEBULIZER TWICE DAILY, Disp: 180 mL, Rfl: 3    MESSAGE:   Plan does not cover this medication IF USED FOR SHORT TERM.  FOR LONG TERM DIAGNOSI

## 2021-03-17 NOTE — TELEPHONE ENCOUNTER
LOV 5/21/20  LR 2/26/21  Prior refill 7/28/20  Latosha Chacon she will fax CMN form to our office for completion. Confirmed our fax # is [phone]. Per Rigo Murray she will send it to this RN's attn.

## 2021-03-18 NOTE — TELEPHONE ENCOUNTER
Per [facility] they rcvd rx for ipratropium-albuterol. Confirmed there is nothing further needed from our office at this time.

## 2021-03-19 RX ORDER — FUROSEMIDE 20 MG/1
TABLET ORAL
Qty: 90 TABLET | Refills: 3 | Status: SHIPPED | OUTPATIENT
Start: 2021-03-19

## 2021-03-19 RX ORDER — METOPROLOL TARTRATE 50 MG/1
TABLET, FILM COATED ORAL
Qty: 180 TABLET | Refills: 3 | Status: SHIPPED | OUTPATIENT
Start: 2021-03-19

## 2021-03-19 RX ORDER — POTASSIUM CHLORIDE 750 MG/1
10 TABLET, FILM COATED, EXTENDED RELEASE ORAL DAILY
Qty: 90 TABLET | Refills: 3 | Status: SHIPPED | OUTPATIENT
Start: 2021-03-19 | End: 2021-10-15

## 2021-03-19 NOTE — TELEPHONE ENCOUNTER
Wife, Constanza Allen, is calling to speak with a nurse or Dr Beverly Lua. Constanza Allen states the patient has two medications needing to be refilled. 1. furosemide 20 MG Oral Tab  2.  Potassium Chloride ER 10 MEQ Oral Tab CR  Constanza Allen states the patient was informed to take dennis

## 2021-03-19 NOTE — TELEPHONE ENCOUNTER
To Dr. Eva Reyes, please advise if you would like meds forwarded to Dr. Luis Grande or if you want to continue refilling? Per Dr. Gaitan Dears ov note 3/8/21 \"Increase furosemide to 40 mg daily if taking 20 mg daily now. May use 2 tablets.  Do this on Tuesday Wednesday

## 2021-03-19 NOTE — TELEPHONE ENCOUNTER
Message noted. I went ahead and refilled prescription and left Lasix 20 mg a day and potassium daily.

## 2021-04-20 RX ORDER — IPRATROPIUM BROMIDE AND ALBUTEROL SULFATE 2.5; .5 MG/3ML; MG/3ML
SOLUTION RESPIRATORY (INHALATION)
Qty: 180 ML | Refills: 5 | Status: SHIPPED | OUTPATIENT
Start: 2021-04-20 | End: 2021-11-23

## 2021-04-20 NOTE — TELEPHONE ENCOUNTER
Last office visit: 5/21/2020  Last refill: 2/26/21    Dr. Khoi Hernandes - Please review pended refill and sign if agreeable

## 2021-04-20 NOTE — TELEPHONE ENCOUNTER
Refill request    Current Outpatient Medications   Medication Sig Dispense Refill   • ipratropium-albuterol 0.5-2.5 (3) MG/3ML Inhalation Solution Use 1 vial via nebulizer twice daily 180 mL 5   • ipratropium-albuterol 0.5-2.5 (3) MG/3ML Inhalation Solutio

## 2021-04-26 RX ORDER — ATORVASTATIN CALCIUM 40 MG/1
TABLET, FILM COATED ORAL
Qty: 90 TABLET | Refills: 0 | Status: SHIPPED | OUTPATIENT
Start: 2021-04-26 | End: 2021-06-14

## 2021-04-28 ENCOUNTER — TELEPHONE (OUTPATIENT)
Dept: INTERNAL MEDICINE CLINIC | Facility: CLINIC | Age: 86
End: 2021-04-28

## 2021-04-28 NOTE — TELEPHONE ENCOUNTER
Wife Krysta Sterling called     Received text message that pt is due for a diabetic eye exam    Patient had his regular eye exam but was not able to complete the full exam due to his dementia  - No vision changes were noted in the exam    Does Dr Akin Wheatley want patient

## 2021-04-28 NOTE — TELEPHONE ENCOUNTER
We can tell wife this message was generated by the hospital and not me. We can discuss next visit. A regular eye exam yearly is fine.

## 2021-05-06 NOTE — TELEPHONE ENCOUNTER
Wife, called     Is it [id number] [address] for patient to use Voltaren gel for his knee pain?   Information with medication advises to check with your doctor & pt has some of the issues mentioned    Call back to [place] [phone]

## 2021-05-06 NOTE — TELEPHONE ENCOUNTER
Message noted. Okay to use Voltaren gel for his knee pain. We can let wife know that no contraindications with his multiple loculations. She can read directions on how much to use and how often.     If she has questions about the issue about steroid in

## 2021-05-06 NOTE — TELEPHONE ENCOUNTER
+ bruit/thrill at right AV fistula sight.  Good distal circulation.   To Dr. Thien Spain -   See recent message below as well as your recommendation of 3/3/21 below re: Voltaren.

## 2021-05-24 ENCOUNTER — APPOINTMENT (OUTPATIENT)
Dept: GENERAL RADIOLOGY | Facility: HOSPITAL | Age: 86
End: 2021-05-24
Payer: MEDICARE

## 2021-05-24 ENCOUNTER — APPOINTMENT (OUTPATIENT)
Dept: ULTRASOUND IMAGING | Facility: HOSPITAL | Age: 86
End: 2021-05-24
Attending: EMERGENCY MEDICINE
Payer: MEDICARE

## 2021-05-24 ENCOUNTER — HOSPITAL ENCOUNTER (EMERGENCY)
Facility: HOSPITAL | Age: 86
Discharge: HOME | End: 2021-05-24
Attending: EMERGENCY MEDICINE
Payer: MEDICARE

## 2021-05-24 ENCOUNTER — TELEPHONE (OUTPATIENT)
Dept: INTERNAL MEDICINE CLINIC | Facility: CLINIC | Age: 86
End: 2021-05-24

## 2021-05-24 VITALS
RESPIRATION RATE: 25 BRPM | SYSTOLIC BLOOD PRESSURE: 143 MMHG | TEMPERATURE: 98 F | HEART RATE: 77 BPM | DIASTOLIC BLOOD PRESSURE: 73 MMHG | OXYGEN SATURATION: 97 %

## 2021-05-24 DIAGNOSIS — R60.9 PERIPHERAL EDEMA: Primary | ICD-10-CM

## 2021-05-24 PROCEDURE — 93970 EXTREMITY STUDY: CPT | Performed by: EMERGENCY MEDICINE

## 2021-05-24 PROCEDURE — 93010 ELECTROCARDIOGRAM REPORT: CPT | Performed by: INTERNAL MEDICINE

## 2021-05-24 PROCEDURE — 80048 BASIC METABOLIC PNL TOTAL CA: CPT | Performed by: EMERGENCY MEDICINE

## 2021-05-24 PROCEDURE — 99285 EMERGENCY DEPT VISIT HI MDM: CPT

## 2021-05-24 PROCEDURE — 71045 X-RAY EXAM CHEST 1 VIEW: CPT | Performed by: EMERGENCY MEDICINE

## 2021-05-24 PROCEDURE — 83880 ASSAY OF NATRIURETIC PEPTIDE: CPT | Performed by: EMERGENCY MEDICINE

## 2021-05-24 PROCEDURE — 96374 THER/PROPH/DIAG INJ IV PUSH: CPT

## 2021-05-24 PROCEDURE — 84484 ASSAY OF TROPONIN QUANT: CPT | Performed by: EMERGENCY MEDICINE

## 2021-05-24 PROCEDURE — 85025 COMPLETE CBC W/AUTO DIFF WBC: CPT | Performed by: EMERGENCY MEDICINE

## 2021-05-24 PROCEDURE — 93005 ELECTROCARDIOGRAM TRACING: CPT

## 2021-05-24 RX ORDER — FUROSEMIDE 10 MG/ML
40 INJECTION INTRAMUSCULAR; INTRAVENOUS ONCE
Status: COMPLETED | OUTPATIENT
Start: 2021-05-24 | End: 2021-05-24

## 2021-05-24 NOTE — ED QUICK NOTES
Discharged instructions reviewed with Pt's wife - instructed to take an additional dose of Lasix for the next 3 days (starting tomorrow). ERT Bobie Olszewski assisted Pt with getting dressed and into vehicle.

## 2021-05-24 NOTE — TELEPHONE ENCOUNTER
Please call patient's wife Sid Lau. I see that he is on the schedule at 4:00. This is for swollen ankles and calves. I worry about the swelling in the calves as we does have to worry about blood clots.   I am thinking that it might be wise for him to go

## 2021-05-24 NOTE — HOME CARE LIAISON
Received referral from [address] [place] [address]. Met with patient and his wife at the bedside. Patient has history with [facility] and would like to utilize [place] [address] services again. Financial interest disclosure provided to patient/wife.  Reside

## 2021-05-24 NOTE — ED QUICK NOTES
Jean arrived from triage with his Wife for c/o progressively worsening BLE pitting edema with associated dyspnea on exertion. Hx of dementia and CVA with R sided residual deficits.  Jean is oriented to person and following commands- he is able to state his na

## 2021-05-24 NOTE — ED QUICK NOTES
Jean's wife provides most of the history as Jean has history of dementia and is a poor historian. She states he's had recent exertional dyspnea.

## 2021-05-24 NOTE — CM/SW NOTE
Met with patient and wife at beside    Wife states patient has a history of HHC but unsure of agency    Agreeable for residential hhc    Referral sent in ingrid

## 2021-05-24 NOTE — ED INITIAL ASSESSMENT (HPI)
[facility] is here for evaluation of lower extremity swelling, worsening over past two weeks.

## 2021-05-24 NOTE — TELEPHONE ENCOUNTER
Spoke with patient's wife, Kat Oshea ([place] per HIPAA), and relayed MD message and instructions. She is agreeable to this plan and will take patient to ER today. I asked if I could assist her in any way in getting patient to ER by calling an ambulance.  Kat Oshea feel

## 2021-05-24 NOTE — TELEPHONE ENCOUNTER
Wife, Karmen Urbano, called back and canceled the patient's  appointment today with Dr Brianna Lombardo. MACK to nursing.

## 2021-05-25 NOTE — ED PROVIDER NOTES
Patient Seen in: [facility] Emergency Department    History   Patient presents with:  Swelling Edema  Difficulty Breathing    Stated Complaint:     HPI    Patient complains of increased swelling in both legs over the past week.   Has chronic dyspne Solution,  Use 1 vial via nebulizer twice daily   furosemide 20 MG Oral Tab,  TAKE ONE TABLET BY MOUTH ONE TIME DAILY   Potassium Chloride ER 10 MEQ Oral Tab CR,  Take 1 tablet (10 mEq total) by mouth daily.    Metoprolol Tartrate 50 MG Oral Tab,  TAKE ONE Systems    Positive for stated complaint:   Other systems are as noted in HPI. Constitutional and vital signs reviewed. All other systems reviewed and negative except as noted above.     [facility] from today and agreed except as otherwise PLATELET.   Procedure                               Abnormality         Status                     ---------                               -----------         ------                     CBC W/ DIFFERENTIAL[[id number]]          Abnormal            Final resul Prescribed:  Discharge Medication List as of 5/24/2021  2:44 PM                         Disposition and Plan     Clinical Impression:  Peripheral edema  (primary encounter diagnosis)    Disposition:  Discharge    Follow-up:  Rakesh Del Toro MD  [facility]

## 2021-05-26 ENCOUNTER — TELEPHONE (OUTPATIENT)
Dept: INTERNAL MEDICINE CLINIC | Facility: CLINIC | Age: 86
End: 2021-05-26

## 2021-05-26 NOTE — TELEPHONE ENCOUNTER
Please call [place]  Pt opened for Home Care today, nursing and PT    Please call regarding patient water pill, pt spouse would like some clarification    Tasked to nursing

## 2021-05-26 NOTE — TELEPHONE ENCOUNTER
Spoke with patients wife Dominick Osullivan to relay MD message below; She asked how soon you would like to see Same? Also, thought he was to take 3 extra days of Lasix --- Is this true? How many extra doses are advised?

## 2021-05-26 NOTE — TELEPHONE ENCOUNTER
Please call patient's wife and let her know that I was able to discuss his condition with Dr. Nneka Sanchez emergency room. I have recommended some extra doses of Lasix. Please make a follow-up for patient when he is able to come in.

## 2021-05-27 ENCOUNTER — TELEPHONE (OUTPATIENT)
Dept: INTERNAL MEDICINE CLINIC | Facility: CLINIC | Age: 86
End: 2021-05-27

## 2021-05-27 NOTE — TELEPHONE ENCOUNTER
Jose/PT/[facility] called  Will be seeing patient  1x per week for 1 week  2x per week for 2 weeks  1x per week for 4 weeks  Will work on gate, balance and safety  This is FYI, please call with any questions or concerns  Tasked to nursing

## 2021-06-04 ENCOUNTER — OFFICE VISIT (OUTPATIENT)
Dept: INTERNAL MEDICINE CLINIC | Facility: CLINIC | Age: 86
End: 2021-06-04
Payer: MEDICARE

## 2021-06-04 VITALS
HEIGHT: 67 IN | DIASTOLIC BLOOD PRESSURE: 82 MMHG | BODY MASS INDEX: 32.33 KG/M2 | WEIGHT: 206 LBS | HEART RATE: 82 BPM | OXYGEN SATURATION: 98 % | SYSTOLIC BLOOD PRESSURE: 122 MMHG

## 2021-06-04 DIAGNOSIS — Z95.828 HISTORY OF LEFT COMMON CAROTID ARTERY STENT PLACEMENT: ICD-10-CM

## 2021-06-04 DIAGNOSIS — Z98.890 HISTORY OF LEFT COMMON CAROTID ARTERY STENT PLACEMENT: ICD-10-CM

## 2021-06-04 DIAGNOSIS — I10 HYPERTENSION, BENIGN: Primary | ICD-10-CM

## 2021-06-04 DIAGNOSIS — F02.80 DEMENTIA ASSOCIATED WITH OTHER UNDERLYING DISEASE WITHOUT BEHAVIORAL DISTURBANCE (HCC): ICD-10-CM

## 2021-06-04 DIAGNOSIS — E11.9 CONTROLLED TYPE 2 DIABETES MELLITUS WITHOUT COMPLICATION, WITHOUT LONG-TERM CURRENT USE OF INSULIN (HCC): ICD-10-CM

## 2021-06-04 DIAGNOSIS — Z95.1 HX OF CABG: ICD-10-CM

## 2021-06-04 DIAGNOSIS — I63.512 CEREBROVASCULAR ACCIDENT (CVA) DUE TO OCCLUSION OF LEFT MIDDLE CEREBRAL ARTERY (HCC): ICD-10-CM

## 2021-06-04 DIAGNOSIS — I82.412 DEEP VENOUS THROMBOSIS OF LEFT PROFUNDA FEMORIS VEIN (HCC): ICD-10-CM

## 2021-06-04 PROCEDURE — 99214 OFFICE O/P EST MOD 30 MIN: CPT | Performed by: INTERNAL MEDICINE

## 2021-06-04 NOTE — PROGRESS NOTES
Shelley Almeida is a 80year old male. HPI:   No chief complaint on file. Jean comes in with his wife. He has a history of left-sided cerebral stroke. This was treated with left carotid artery stent. He is on aspirin and Plavix.   Per his wife she did This is asymptomatic. For now I do not feel he needs any anticoagulation. He is on dual antiplatelet therapy for his carotid stent. Patient's wife indicated that she will see Dr. Anuel Murrell from pulmonary as patient does have some shortness of breath. mg by mouth daily. • Atorvastatin Calcium (LIPITOR) 40 MG Oral Tab Take 1 tablet by mouth nightly.           Past Medical History:   Diagnosis Date   • Acute, but ill-defined, cerebrovascular disease 12/2018    Per patient wife, pt had stroke December 2 adenopathy,    LUNGS:  clear to auscultation. Effort normal  CARDIO:  RRR without murmur. S1 and S2 normal  GI:  good BS's,  no masses,   HSM or tenderness  EXTREMITIES : no cyanosis, clubbing or edema    ASSESSMENT AND PLAN:     1.  Hypertension, benign

## 2021-06-07 ENCOUNTER — MED REC SCAN ONLY (OUTPATIENT)
Dept: INTERNAL MEDICINE CLINIC | Facility: CLINIC | Age: 86
End: 2021-06-07

## 2021-06-08 ENCOUNTER — TELEPHONE (OUTPATIENT)
Dept: INTERNAL MEDICINE CLINIC | Facility: CLINIC | Age: 86
End: 2021-06-08

## 2021-06-08 NOTE — TELEPHONE ENCOUNTER
To Dr. Ortiz Lovett - see Anna's message below. Per Feliz Parents: 1.5 cm increase on both ankles, calves are the same.   Anna's/RN suggestion is to double lasix/K+ once/twice week on a regular basis (e.g.. every Sunday/Wed) verses waiting until fluid buildup and then

## 2021-06-08 NOTE — TELEPHONE ENCOUNTER
[facility] Has an increase of edema in his lower extremities and she would like to discuss diuretics ph.  #    [phone]   Routed to clinical

## 2021-06-08 NOTE — TELEPHONE ENCOUNTER
Message noted.     1.  I agree with Sophie's recommendation for patient on 2 days of the week to double up on his furosemide and take two of his 20 mg tablets of furosemide and 2 potassium tablets and the rest of the 5 days one of his furosemide tablets and

## 2021-06-08 NOTE — TELEPHONE ENCOUNTER
Spoke with home health [address] and relayed MDs message. She verbalized understanding.    Med module updated

## 2021-06-11 ENCOUNTER — TELEPHONE (OUTPATIENT)
Dept: INTERNAL MEDICINE CLINIC | Facility: CLINIC | Age: 86
End: 2021-06-11

## 2021-06-11 NOTE — TELEPHONE ENCOUNTER
Message noted. Please tell Ayde Mary the text was not initiated by myself. The text was sent out by the hospital system. Jean does not need anything now. The \"nephropathy screening test\" is a urine test that we can do sometime in the future.   It is not ne

## 2021-06-11 NOTE — TELEPHONE ENCOUNTER
Spoke to Favian (wife okay per maría elena) and advised per MD, robin verbalized understanding and has no further questions.

## 2021-06-11 NOTE — TELEPHONE ENCOUNTER
Patient wife Maria Del Carmen Rawls calling regarding text message she received stating patient needs to schedule nephropathy screening test.  Asking what this test is and where to schedule.      Best call back number Maria Del Carmen Rawls [phone]

## 2021-06-13 RX ORDER — FINASTERIDE 5 MG/1
TABLET, FILM COATED ORAL
Qty: 90 TABLET | Refills: 3 | Status: SHIPPED | OUTPATIENT
Start: 2021-06-13

## 2021-06-14 RX ORDER — ATORVASTATIN CALCIUM 40 MG/1
TABLET, FILM COATED ORAL
Qty: 90 TABLET | Refills: 0 | Status: SHIPPED | OUTPATIENT
Start: 2021-06-14

## 2021-06-21 ENCOUNTER — TELEPHONE (OUTPATIENT)
Dept: INTERNAL MEDICINE CLINIC | Facility: CLINIC | Age: 86
End: 2021-06-21

## 2021-06-21 RX ORDER — CLOTRIMAZOLE 10 MG/1
10 LOZENGE ORAL; TOPICAL
Qty: 35 TROCHE | Refills: 0 | Status: SHIPPED | OUTPATIENT
Start: 2021-06-21

## 2021-06-21 NOTE — TELEPHONE ENCOUNTER
Alex Gomez @ [facility] is with the patient now, at the patient's home. \"Patient has thrust in his mouth. \"     Ph # [phone]

## 2021-06-21 NOTE — TELEPHONE ENCOUNTER
I spoke with home health nurse Tio Law. Patient has a yellow coating in his mouth and on the tongue. She thinks this is mild thrush. She asks if a medication can be sent in for the patient.  Tio Law says that the patient's furosemide and potassium were increase

## 2021-06-21 NOTE — TELEPHONE ENCOUNTER
Spoke to [facility] nurse and relayed MD message. Mónica Yanez verbalized understanding and read-back orders correctly. Speech eval will be done at pt's home per Mónica Yanez.  Called Maurice Her pt's wife to ask what is the pt's preferred pharmacy- [facility]. Rx pended to MD to re

## 2021-06-21 NOTE — TELEPHONE ENCOUNTER
Message noted. 1.  For mild cases of oral candidiasis or thrush we can use Chlortrimazole troches. These are wafers that he would put in his mouth 5 times a day for 7 days.   Please ask your pharmacy she wishes us to send this to.    2.  For speech ther

## 2021-06-24 ENCOUNTER — TELEPHONE (OUTPATIENT)
Dept: INTERNAL MEDICINE CLINIC | Facility: CLINIC | Age: 86
End: 2021-06-24

## 2021-06-24 NOTE — TELEPHONE ENCOUNTER
[facility]/Speech Therapist called  Completed evaluation, not recommending any other visits at this time  Shared some information with spouse: communication strategies for dementia patients  fyi for Dr Martín Mclain, no call back needed

## 2021-07-08 ENCOUNTER — TELEPHONE (OUTPATIENT)
Dept: INTERNAL MEDICINE CLINIC | Facility: CLINIC | Age: 86
End: 2021-07-08

## 2021-07-08 NOTE — TELEPHONE ENCOUNTER
Martín Carlin from [facility] calling, patient will be discharged 7/19/2021 from home healthcare. Patient is doing well.     No call back necessary

## 2021-09-20 ENCOUNTER — APPOINTMENT (OUTPATIENT)
Dept: CARDIOLOGY | Age: 86
End: 2021-09-20

## 2021-10-08 ENCOUNTER — LAB ENCOUNTER (OUTPATIENT)
Dept: LAB | Age: 86
End: 2021-10-08
Attending: INTERNAL MEDICINE
Payer: MEDICARE

## 2021-10-08 DIAGNOSIS — I35.1 AORTIC VALVE INSUFFICIENCY: Primary | ICD-10-CM

## 2021-10-08 DIAGNOSIS — I25.119: ICD-10-CM

## 2021-10-08 DIAGNOSIS — E78.5 HYPERLIPIDEMIA: ICD-10-CM

## 2021-10-08 PROCEDURE — 84460 ALANINE AMINO (ALT) (SGPT): CPT

## 2021-10-08 PROCEDURE — 36415 COLL VENOUS BLD VENIPUNCTURE: CPT

## 2021-10-08 PROCEDURE — 84450 TRANSFERASE (AST) (SGOT): CPT

## 2021-10-08 PROCEDURE — 80061 LIPID PANEL: CPT

## 2021-10-11 ENCOUNTER — TELEPHONE (OUTPATIENT)
Dept: INTERNAL MEDICINE CLINIC | Facility: CLINIC | Age: 86
End: 2021-10-11

## 2021-10-11 NOTE — TELEPHONE ENCOUNTER
Spoke to wife--- we talked about the formulation they have; It's already round; She will dissolve one in a small amt of water and see if the time release pellets are still there; If so we can use applesauce;   She will call me back if needed

## 2021-10-11 NOTE — TELEPHONE ENCOUNTER
Pt wife is Maria Del Carmen Rawls  Is calling and states that the Pt is taking a Potassium Chloride 10MG Tab and the pill is just to big. The pt cant swallow it and he has to take two of them. Is there a smaller pill he can take.   Pt would rather not cut the current p

## 2021-10-11 NOTE — TELEPHONE ENCOUNTER
Should we just advise patient to cut the pill ? Or take it with apple sauce or yogurt?      Please advise

## 2021-10-12 NOTE — TELEPHONE ENCOUNTER
Patient's wife Dayton Abrams is calling to speak to Peter Girard or Dr Ortiz Lovett  She tried to dissolve the pill in water it did not dissolve    Please call Dayton Abrams [phone]  She is aware Peter Girard is out of the office

## 2021-10-15 RX ORDER — POTASSIUM CHLORIDE 750 MG/1
10 TABLET, EXTENDED RELEASE ORAL DAILY
Qty: 90 TABLET | Refills: 3 | Status: SHIPPED | OUTPATIENT
Start: 2021-10-15

## 2021-11-01 ENCOUNTER — TELEPHONE (OUTPATIENT)
Dept: INTERNAL MEDICINE CLINIC | Facility: CLINIC | Age: 86
End: 2021-11-01

## 2021-11-01 DIAGNOSIS — R04.0 EPISTAXIS: Primary | ICD-10-CM

## 2021-11-01 NOTE — TELEPHONE ENCOUNTER
Spoke to pt spouse (ok per hipaa) and advised on MD message below; spouse verbalized understanding. Contact information provided.      Letter mailed to home per spouse request.

## 2021-11-01 NOTE — TELEPHONE ENCOUNTER
Please call pt spouse, Amber Holguin  pt is having nose bleeds every morning, seem to be getting worse  Should they change dosage? Should they change time of day they give medication?   Please call Amber Holguin to discuss/advise  Tasked to Coumadin

## 2021-11-01 NOTE — TELEPHONE ENCOUNTER
Message noted. 1. In terms of his nosebleeds please give him contact information to Dr. Krystina Walls. Patient had seen Dr. Senait Noonan in the past but Dr. Senait Noonan is no longer available. Dr. Thomasena Riedel contact number [phone].   He is in the same office as [facility]

## 2021-11-01 NOTE — TELEPHONE ENCOUNTER
To Angelo Baer----    Spoke to pt's spouse who reports pt has been experiencing nose bleeds in the AM after blowing his nose. States bleeds happen only after pt blows nose and that pt blows his nose forcefully even after being advised not to.  States prior to th

## 2021-11-02 ENCOUNTER — OFFICE VISIT (OUTPATIENT)
Dept: OTOLARYNGOLOGY | Facility: CLINIC | Age: 86
End: 2021-11-02
Payer: MEDICARE

## 2021-11-02 VITALS
HEIGHT: 67 IN | WEIGHT: 206 LBS | BODY MASS INDEX: 32.33 KG/M2 | SYSTOLIC BLOOD PRESSURE: 139 MMHG | DIASTOLIC BLOOD PRESSURE: 73 MMHG

## 2021-11-02 DIAGNOSIS — R04.0 NOSEBLEED: Primary | ICD-10-CM

## 2021-11-02 DIAGNOSIS — H61.22 IMPACTED CERUMEN OF LEFT EAR: ICD-10-CM

## 2021-11-02 PROCEDURE — 99213 OFFICE O/P EST LOW 20 MIN: CPT | Performed by: OTOLARYNGOLOGY

## 2021-11-02 PROCEDURE — 30901 CONTROL OF NOSEBLEED: CPT | Performed by: OTOLARYNGOLOGY

## 2021-11-02 PROCEDURE — G0268 REMOVAL OF IMPACTED WAX MD: HCPCS | Performed by: OTOLARYNGOLOGY

## 2021-11-02 NOTE — PROGRESS NOTES
Anil Alva is a 80year old male. Patient presents with:  Epistaxis: pt is here today have a few blood noses over the course of 5 days       HISTORY OF PRESENT ILLNESS  He presents today with bloody noses for the past 5 days he is on a blood thinner.   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 Eyebrows - Normal. Skull - Normal.        Nasopharynx Normal External nose - Normal. Lips/teeth/gums - Normal. Tonsils - Normal. Oropharynx - Normal.   Ears Normal Inspection - Right: Normal, Left: Normal. Canal - Right: No Disp: [address], Rfl: 0  •  FINASTERIDE 5 MG Oral Tab, TAKE ONE TABLET BY MOUTH ONE TIME DAILY , Disp: 90 tablet, Rfl: 3  •  ipratropium-albuterol 0.5-2.5 (3) MG/3ML Inhalation Solution, Use 1 vial via nebulizer twice daily, Disp: 180 mL, Rfl: 5  •  furose ASSESSMENT AND PLAN    1. Nosebleed    2. Impacted cerumen of left ear  Impacted cerumen cleared on the left. Right septal vessels cauterized with silver nitrate. Use Vaseline 3 times daily and return to see me in 1 month.   Epistaxis precautions discus

## 2021-11-04 ENCOUNTER — HOSPITAL ENCOUNTER (EMERGENCY)
Facility: HOSPITAL | Age: 86
Discharge: HOME | End: 2021-11-04
Attending: EMERGENCY MEDICINE
Payer: MEDICARE

## 2021-11-04 ENCOUNTER — TELEPHONE (OUTPATIENT)
Dept: INTERNAL MEDICINE CLINIC | Facility: CLINIC | Age: 86
End: 2021-11-04

## 2021-11-04 VITALS
OXYGEN SATURATION: 94 % | SYSTOLIC BLOOD PRESSURE: 177 MMHG | DIASTOLIC BLOOD PRESSURE: 96 MMHG | RESPIRATION RATE: 16 BRPM | WEIGHT: 200 LBS | HEART RATE: 72 BPM | HEIGHT: 70 IN | TEMPERATURE: 99 F | BODY MASS INDEX: 28.63 KG/M2

## 2021-11-04 DIAGNOSIS — R04.0 ACUTE ANTERIOR EPISTAXIS: Primary | ICD-10-CM

## 2021-11-04 PROCEDURE — 99283 EMERGENCY DEPT VISIT LOW MDM: CPT

## 2021-11-04 PROCEDURE — 36415 COLL VENOUS BLD VENIPUNCTURE: CPT

## 2021-11-04 PROCEDURE — 85025 COMPLETE CBC W/AUTO DIFF WBC: CPT | Performed by: EMERGENCY MEDICINE

## 2021-11-04 PROCEDURE — 85610 PROTHROMBIN TIME: CPT | Performed by: EMERGENCY MEDICINE

## 2021-11-04 PROCEDURE — 80048 BASIC METABOLIC PNL TOTAL CA: CPT | Performed by: EMERGENCY MEDICINE

## 2021-11-04 PROCEDURE — 85730 THROMBOPLASTIN TIME PARTIAL: CPT | Performed by: EMERGENCY MEDICINE

## 2021-11-04 NOTE — TELEPHONE ENCOUNTER
Patient wife, Karmen Urbano, asked that Dr Megha Hutson please call  Pt has been to ER twice can't get bleeding to stop in nose  Pt is home now, should pt stop taking other medications/non--blood thinner?   Tasked to nursing

## 2021-11-04 NOTE — ED INITIAL ASSESSMENT (HPI)
Pt brought in by wife for continued nose bleed on plavix. Reports it started on Monday. Saw Bina Kohli in office and had it cauterized on Tuesday. Started bleeding again yesterday, went to OSH and had rhino rocket placed. Still bleeding through rhino rocket.  O

## 2021-11-04 NOTE — TELEPHONE ENCOUNTER
Emergency room note reviewed. Dr. Maddie Hamilton note from November 2 reviewed. We can tell wife that in my opinion we can \"hold\" the Plavix for a week or so until we make sure his nose bleeding stops. He should continue his aspirin 81 mg a day.     Ana

## 2021-11-04 NOTE — TELEPHONE ENCOUNTER
Per record, patient was seen in the ED today. Per ED documentation:  Patient presents to the emergency department with the bleeding around the [facility] that was placed at an outside hospital yesterday.   He has had nosebleeds and was seen by his ENT doc

## 2021-11-04 NOTE — TELEPHONE ENCOUNTER
Spoke with spouse Lorena Davis and relayed MDs message-verbalized understanding. She already has a call out to Dr. Renae Corona. I advised that she call our office back with an update after she hears from Dr. Renae Corona regarding holding of blood thinners.    Patient has a rhin

## 2021-11-05 NOTE — TELEPHONE ENCOUNTER
Patient's wife Roddy Naeem is calling Dr France Junior agreed to patient holding his blood thinners until after his procedure

## 2021-11-05 NOTE — TELEPHONE ENCOUNTER
Message noted. Just to clarify I would only hold one of his blood thinners i.e. the Plavix but I would have him continue the aspirin 81 mg a day.

## 2021-11-05 NOTE — TELEPHONE ENCOUNTER
I spoke with Brandi Momin, and relayed Dr. Elyse Haddad message. She agreed with the recommendation. She verbalized understanding.

## 2021-11-08 ENCOUNTER — TELEPHONE (OUTPATIENT)
Dept: OTOLARYNGOLOGY | Facility: CLINIC | Age: 86
End: 2021-11-08

## 2021-11-08 NOTE — TELEPHONE ENCOUNTER
Future Appointments   Date Time Provider Brandi Mancia   11/9/2021  1:40 PM Carlos Rawls MD [address] [facility]   12/2/2021 11:40 AM Carlos Rawls MD [address] [facility]   12/6/2021 11:30 AM MD CHLOE Mejía     Per pt wife, packing

## 2021-11-08 NOTE — TELEPHONE ENCOUNTER
Patients wife states packing is falling out of nose and is basically almost out. States they have an appointment tomorrow but would like to get in today.  Please advise

## 2021-11-09 ENCOUNTER — OFFICE VISIT (OUTPATIENT)
Dept: OTOLARYNGOLOGY | Facility: CLINIC | Age: 86
End: 2021-11-09
Payer: MEDICARE

## 2021-11-09 VITALS
SYSTOLIC BLOOD PRESSURE: 116 MMHG | BODY MASS INDEX: 31 KG/M2 | HEART RATE: 107 BPM | HEIGHT: 67 IN | DIASTOLIC BLOOD PRESSURE: 73 MMHG

## 2021-11-09 DIAGNOSIS — R04.0 NOSEBLEED: Primary | ICD-10-CM

## 2021-11-09 PROCEDURE — 99213 OFFICE O/P EST LOW 20 MIN: CPT | Performed by: OTOLARYNGOLOGY

## 2021-11-09 NOTE — PROGRESS NOTES
Anil Alva is a 80year old male.   Patient presents with:  Nose Bleed: packing placed on 11/03 at [facility], started bleeding and reinflated on 11/04 [facility], per pt off blood thinner, packing is coming out       HISTORY OF PRESENT ILLNESS  He presents toda Medical History:   Diagnosis Date   • Acute, but ill-defined, cerebrovascular disease 12/2018    Per patient wife, pt had stroke December 2018   • Ankle fracture, left    • CAD (coronary artery disease)     cardiologist dr. Maria T Turner   • Calcaneal spur, lef Anxiety and depression. Integumentary Negative Frequent skin infections, pigment change and rash. Hema/Lymph Negative Easy bleeding and easy bruising.            PHYSICAL EXAM    /73   Pulse 107   Ht 5' 7\" (1.702 m)   BMI 31.32 kg/m²        Const Anesthesia used was topical Lidocaine/epinephrine 4%/1:44819   Patient tolerated the procedure well. Discharge instructions were discussed with the patient/parent. Epistaxis precautions were explained and understood.  Use Vaseline and/or nasal saline gel to MG Oral Cap, Take 50 mg by mouth 2 (two) times daily. , Disp: , Rfl:   •  Cholecalciferol (VITAMIN D3) 1000 units Oral Cap, Take 2 tablets by mouth daily. , Disp: 30 capsule, Rfl: 0  •  Vitamin B-12 1000 MCG Oral Tab, Take 1,000 mcg by mouth daily. , Disp:

## 2021-11-23 RX ORDER — LANCETS
EACH MISCELLANEOUS
Qty: 100 EACH | Refills: 3 | Status: SHIPPED | OUTPATIENT
Start: 2021-11-23

## 2021-11-23 RX ORDER — IPRATROPIUM BROMIDE AND ALBUTEROL SULFATE 2.5; .5 MG/3ML; MG/3ML
SOLUTION RESPIRATORY (INHALATION)
Qty: 180 ML | Refills: 5 | Status: SHIPPED | OUTPATIENT
Start: 2021-11-23

## 2021-12-02 ENCOUNTER — OFFICE VISIT (OUTPATIENT)
Dept: OTOLARYNGOLOGY | Facility: CLINIC | Age: 86
End: 2021-12-02
Payer: MEDICARE

## 2021-12-02 VITALS — BODY MASS INDEX: 28.63 KG/M2 | WEIGHT: 200 LBS | HEIGHT: 70 IN | TEMPERATURE: 97 F

## 2021-12-02 DIAGNOSIS — R04.0 NOSEBLEED: Primary | ICD-10-CM

## 2021-12-02 PROCEDURE — 99213 OFFICE O/P EST LOW 20 MIN: CPT | Performed by: OTOLARYNGOLOGY

## 2021-12-02 NOTE — PROGRESS NOTES
Ying Oneal is a 80year old male. Patient presents with:   Other: Patient is here for follow up stated doing better no more episodes of nosebleed      HISTORY OF PRESENT ILLNESS    He presents today with bloody noses for the past 5 days he is on a blood Exercise: No      Family History   Problem Relation Age of Onset   • Cancer Father         lung cancer   • Heart Attack Mother    • Pulmonary Disease Brother    • Heart Attack Brother    • Actinic Keratosis Brother    • Heart Attack Brother    • Other (MS) Blurred vision and vision changes. Respiratory Negative Dyspnea and wheezing. Cardio Negative Chest pain, irregular heartbeat/palpitations and syncope. GI Negative Abdominal pain and diarrhea.    Endocrine Negative Cold intolerance and heat intoleranc Softclix Lancets Does not apply Misc, USE TO TEST BLOOD SUGAR ONCE DAILY, Disp: 100 each, Rfl: 3  •  IPRATROPIUM-ALBUTEROL 0.5-2.5 (3) MG/3ML Inhalation Solution, USE 1 VIAL VIA NEBULIZER TWICE DAILY, Disp: 180 mL, Rfl: 5  •  potassium chloride 10 MEQ Oral Vitamin B-12 1000 MCG Oral Tab, Take 1,000 mcg by mouth daily. , Disp: , Rfl:   •  aspirin 81 MG Oral Tab, Take 81 mg by mouth daily. , Disp: , Rfl:   •  Atorvastatin Calcium (LIPITOR) 40 MG Oral Tab, Take 1 tablet by mouth nightly.  , Disp: , Rfl:   ASSESSM

## 2021-12-03 ENCOUNTER — TELEPHONE (OUTPATIENT)
Dept: INTERNAL MEDICINE CLINIC | Facility: CLINIC | Age: 86
End: 2021-12-03

## 2021-12-03 NOTE — TELEPHONE ENCOUNTER
Received a fax from [employer] requesting diabetic testing supplies. [facility] is requesting Glucose Test Strips, Lancets, Meter, and Lancets Device. Placed in blue folder.

## 2021-12-06 ENCOUNTER — TELEPHONE (OUTPATIENT)
Dept: INTERNAL MEDICINE CLINIC | Facility: CLINIC | Age: 86
End: 2021-12-06

## 2021-12-06 ENCOUNTER — OFFICE VISIT (OUTPATIENT)
Dept: INTERNAL MEDICINE CLINIC | Facility: CLINIC | Age: 86
End: 2021-12-06
Payer: MEDICARE

## 2021-12-06 ENCOUNTER — LAB ENCOUNTER (OUTPATIENT)
Dept: LAB | Age: 86
End: 2021-12-06
Attending: INTERNAL MEDICINE
Payer: MEDICARE

## 2021-12-06 VITALS
RESPIRATION RATE: 16 BRPM | HEART RATE: 75 BPM | WEIGHT: 193 LBS | BODY MASS INDEX: 27.63 KG/M2 | SYSTOLIC BLOOD PRESSURE: 134 MMHG | DIASTOLIC BLOOD PRESSURE: 68 MMHG | TEMPERATURE: 98 F | OXYGEN SATURATION: 99 % | HEIGHT: 70 IN

## 2021-12-06 DIAGNOSIS — Z98.890 HISTORY OF LEFT COMMON CAROTID ARTERY STENT PLACEMENT: ICD-10-CM

## 2021-12-06 DIAGNOSIS — I35.1 NONRHEUMATIC AORTIC VALVE INSUFFICIENCY: ICD-10-CM

## 2021-12-06 DIAGNOSIS — F02.80 DEMENTIA ASSOCIATED WITH OTHER UNDERLYING DISEASE WITHOUT BEHAVIORAL DISTURBANCE (HCC): ICD-10-CM

## 2021-12-06 DIAGNOSIS — I10 HYPERTENSION, BENIGN: Primary | ICD-10-CM

## 2021-12-06 DIAGNOSIS — I63.512 CEREBROVASCULAR ACCIDENT (CVA) DUE TO OCCLUSION OF LEFT MIDDLE CEREBRAL ARTERY (HCC): ICD-10-CM

## 2021-12-06 DIAGNOSIS — Z23 FLU VACCINE NEED: ICD-10-CM

## 2021-12-06 DIAGNOSIS — Z95.828 HISTORY OF LEFT COMMON CAROTID ARTERY STENT PLACEMENT: ICD-10-CM

## 2021-12-06 DIAGNOSIS — R29.898 MUSCULAR DECONDITIONING: ICD-10-CM

## 2021-12-06 DIAGNOSIS — R63.4 WEIGHT LOSS: ICD-10-CM

## 2021-12-06 DIAGNOSIS — E11.9 CONTROLLED TYPE 2 DIABETES MELLITUS WITHOUT COMPLICATION, WITHOUT LONG-TERM CURRENT USE OF INSULIN (HCC): ICD-10-CM

## 2021-12-06 DIAGNOSIS — Z95.1 HX OF CABG: ICD-10-CM

## 2021-12-06 DIAGNOSIS — Z00.00 ROUTINE HEALTH MAINTENANCE: ICD-10-CM

## 2021-12-06 DIAGNOSIS — I82.412 DEEP VENOUS THROMBOSIS OF LEFT PROFUNDA FEMORIS VEIN (HCC): ICD-10-CM

## 2021-12-06 PROCEDURE — 82607 VITAMIN B-12: CPT

## 2021-12-06 PROCEDURE — 99214 OFFICE O/P EST MOD 30 MIN: CPT | Performed by: INTERNAL MEDICINE

## 2021-12-06 PROCEDURE — 90662 IIV NO PRSV INCREASED AG IM: CPT | Performed by: INTERNAL MEDICINE

## 2021-12-06 PROCEDURE — 36415 COLL VENOUS BLD VENIPUNCTURE: CPT

## 2021-12-06 PROCEDURE — 80053 COMPREHEN METABOLIC PANEL: CPT

## 2021-12-06 PROCEDURE — 84443 ASSAY THYROID STIM HORMONE: CPT

## 2021-12-06 PROCEDURE — 83036 HEMOGLOBIN GLYCOSYLATED A1C: CPT

## 2021-12-06 PROCEDURE — G0008 ADMIN INFLUENZA VIRUS VAC: HCPCS | Performed by: INTERNAL MEDICINE

## 2021-12-06 RX ORDER — POTASSIUM CHLORIDE 750 MG/1
1 TABLET, FILM COATED, EXTENDED RELEASE ORAL DAILY
COMMUNITY
Start: 2020-12-03

## 2021-12-06 NOTE — PROGRESS NOTES
Jalil Garcia is a 80year old male. HPI:   Patient presents with: Follow - Up: 6 MONTH     Jean is here with his wife. He has lost a considerable amount of weight because he is not eating. We talked about this.   He will have a sandwich but then not fi TAKE ONE TABLET BY MOUTH ONE TIME DAILY  90 tablet 3   • furosemide 20 MG Oral Tab TAKE ONE TABLET BY MOUTH ONE TIME DAILY (Patient taking differently: Take 20 mg by mouth daily.) 90 tablet 3   • Metoprolol Tartrate 50 MG Oral Tab TAKE ONE TABLET BY MOUTH Diagnosis Date   • Acute, but ill-defined, cerebrovascular disease 12/2018    Per patient wife, pt had stroke December 2018   • Ankle fracture, left    • CAD (coronary artery disease)     cardiologist dr. Dewey Irving   • Calcaneal spur, left 04-    x states that he states that his \"jaw\" hurts when yawning. I do not feel any abnormalities in the jaw area. 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   He had a CT skin soft tissue August 21, 2020 of neck showing left submandibular gland vinicio HEMOGLOBIN A1C; Future  - VITAMIN B12 WITH REFLEX TO MMA; Future    8. Routine health maintenance  He has had Covid plus booster. Flu vaccine today. 9. Nonrheumatic aortic valve insufficiency  Asymptomatic. He follows with cardiology.     10. Flu vacci

## 2021-12-07 RX ORDER — IPRATROPIUM BROMIDE AND ALBUTEROL SULFATE 2.5; .5 MG/3ML; MG/3ML
SOLUTION RESPIRATORY (INHALATION)
Qty: 180 ML | Refills: 3 | Status: CANCELLED | OUTPATIENT
Start: 2021-12-07

## 2021-12-07 NOTE — TELEPHONE ENCOUNTER
Left message for Cele Calderon at [facility] to let her know that Dr. Kwabena Gordon signed note in Epic and to see if she needs this faxed. Left message to call back.

## 2021-12-07 NOTE — TELEPHONE ENCOUNTER
Natasha/yannick called      - Visit notes, in Epic, from yesterday need to be signed for home health

## 2021-12-08 ENCOUNTER — TELEPHONE (OUTPATIENT)
Dept: PULMONOLOGY | Facility: CLINIC | Age: 86
End: 2021-12-08

## 2021-12-08 NOTE — TELEPHONE ENCOUNTER
•  IPRATROPIUM-ALBUTEROL 0.5-2.5 (3) MG/3ML Inhalation Solution, USE 1 VIAL VIA NEBULIZER TWICE DAILY (Patient not taking: Reported on 12/6/2021), Disp: 180 mL, Rfl: 5

## 2021-12-09 ENCOUNTER — TELEPHONE (OUTPATIENT)
Dept: INTERNAL MEDICINE CLINIC | Facility: CLINIC | Age: 86
End: 2021-12-09

## 2021-12-09 NOTE — TELEPHONE ENCOUNTER
Spoke to pharmacy staff regarding message below. Pharmacist states CMN form is needed and will fax to [facility].

## 2021-12-09 NOTE — TELEPHONE ENCOUNTER
Cathy/[facility]  Pt was admitted to [facility] today  Will be sending orders for Dr Carlos Bowden  Only nursing, OT and  ordered  Felicitas Hernandez believes pt would benefit from PT as well  Tasked to nursing as fyi, orders will follow

## 2021-12-10 ENCOUNTER — TELEPHONE (OUTPATIENT)
Dept: INTERNAL MEDICINE CLINIC | Facility: CLINIC | Age: 86
End: 2021-12-10

## 2021-12-10 NOTE — TELEPHONE ENCOUNTER
office will need to complete the CMS form prior to dispensing;  Spoke to [employer];   It has been faxed

## 2021-12-10 NOTE — TELEPHONE ENCOUNTER
Fan Underwood Jean uses DuoNeb twice a day. For some reason his wife indicated that there is some sort of hold up in getting it approved. There have been orders by Dr. Shaniqua Christianson from pulmonary for refills. Would you be able to get this approved.   The instruct

## 2021-12-10 NOTE — TELEPHONE ENCOUNTER
Please let wife know that Jean's labs look acceptable. Diabetes in the satisfactory range. No changes in his medications are needed for now. It looks like Dr. Kimberly Gutierrez office from [facility] is working on the [place] refills.

## 2021-12-10 NOTE — TELEPHONE ENCOUNTER
Patient called and spoke with wife, Estelle Hough. Estelle Hough was relayed Dr Kyara Chavira message and verbalized understanding with no further questions noted.

## 2021-12-13 ENCOUNTER — TELEPHONE (OUTPATIENT)
Dept: PULMONOLOGY | Facility: CLINIC | Age: 86
End: 2021-12-13

## 2021-12-13 ENCOUNTER — TELEPHONE (OUTPATIENT)
Dept: INTERNAL MEDICINE CLINIC | Facility: CLINIC | Age: 86
End: 2021-12-13

## 2021-12-13 NOTE — TELEPHONE ENCOUNTER
Received RX form from [place] and placed in [employer] [facility] folder for signature.

## 2021-12-13 NOTE — TELEPHONE ENCOUNTER
[facility] Diabetic Detailed Order Form completed and faxed to [facility] at  and confirmation received.   Original sent to scan

## 2021-12-15 ENCOUNTER — TELEPHONE (OUTPATIENT)
Dept: INTERNAL MEDICINE CLINIC | Facility: CLINIC | Age: 86
End: 2021-12-15

## 2021-12-15 NOTE — TELEPHONE ENCOUNTER
rec'd corrected number from another [facility] nurse for [facility] at [phone]

## 2021-12-15 NOTE — TELEPHONE ENCOUNTER
To FD----    RN attempted to call back Ajay Orlando PT with number listed from [address] call and was advised Incorrect #. Are you able to please look at phone log and verify correct #? Also, please clarify statement:   \"Also, asking if they can request order fr

## 2021-12-15 NOTE — TELEPHONE ENCOUNTER
Jaxon Eastman / [facility] is call they saw patient for PT evaluation  Patient will be seen twice a week for 1 week, then 1 time a week for 7 weeks    Also, asking if they can request order from Dr Madhavi Murray / Pulmonology

## 2021-12-16 NOTE — TELEPHONE ENCOUNTER
Shauna Andujar, [address]     ID    12/13/21 5:58 PM  Note  Received RX form from [employer] and placed in [address] folder for signature.         See TE 12/13/21

## 2021-12-16 NOTE — TELEPHONE ENCOUNTER
Gene Quintero at [facility] is calling back with his correct phone number of [phone].

## 2021-12-16 NOTE — TELEPHONE ENCOUNTER
Detailed written order for DuoNeb qty 540ML refills 5 signed by Dr. Grazyna Melgar and faxed back to [place] at fx# [phone] and fx# [phone]. Confirmation received and form sent to scanning.

## 2021-12-16 NOTE — TELEPHONE ENCOUNTER
Spoke to Josef and he was asking if Andrew Gonzalez would just give verbal order that he is okay that once patient see's [facility] if there is anything  orders Andrew Gonzalez would be okay with it like a sleep machine etc.

## 2021-12-16 NOTE — TELEPHONE ENCOUNTER
Called Jose back, lmtcb  Trying to clarify Ata question regarding needing an order for Bertha Victoria

## 2021-12-20 ENCOUNTER — TELEPHONE (OUTPATIENT)
Dept: INTERNAL MEDICINE CLINIC | Facility: CLINIC | Age: 86
End: 2021-12-20

## 2021-12-20 NOTE — TELEPHONE ENCOUNTER
Jose/Kellen called     Pt's wife is requesting a hospital bed for him    [facility] will be sending paperwork to   Dr Megha Hutson

## 2021-12-29 ENCOUNTER — TELEPHONE (OUTPATIENT)
Dept: INTERNAL MEDICINE CLINIC | Facility: CLINIC | Age: 86
End: 2021-12-29

## 2021-12-29 NOTE — TELEPHONE ENCOUNTER
Albina/[facility] called  Nurse not able to go out to see pt today  Pt contacted, doing ok, and ok to wait until next week for home visit  Fyi, no call back needed   Tasked to nursing

## 2022-01-01 ENCOUNTER — TELEPHONE (OUTPATIENT)
Dept: INTERNAL MEDICINE CLINIC | Facility: CLINIC | Age: 87
End: 2022-01-01

## 2022-01-01 RX ORDER — BLOOD SUGAR DIAGNOSTIC
STRIP MISCELLANEOUS
Qty: 100 STRIP | Refills: 3 | OUTPATIENT
Start: 2022-01-01

## 2022-01-01 RX ORDER — TAMSULOSIN HYDROCHLORIDE 0.4 MG/1
CAPSULE ORAL
Qty: 90 CAPSULE | Refills: 3 | Status: SHIPPED | OUTPATIENT
Start: 2022-01-01

## 2022-01-01 RX ORDER — FUROSEMIDE 20 MG/1
TABLET ORAL
Qty: 90 TABLET | Refills: 3 | Status: SHIPPED | OUTPATIENT
Start: 2022-01-01

## 2022-01-11 ENCOUNTER — TELEPHONE (OUTPATIENT)
Dept: INTERNAL MEDICINE CLINIC | Facility: CLINIC | Age: 87
End: 2022-01-11

## 2022-01-11 NOTE — TELEPHONE ENCOUNTER
Jose/[facility] requesting verbal order to add extra Physical therapy visits  1 for this week, 1 for next week  Tasked to nursing

## 2022-01-24 RX ORDER — PANTOPRAZOLE SODIUM 40 MG/1
40 TABLET, DELAYED RELEASE ORAL
Qty: 90 TABLET | Refills: 3 | Status: SHIPPED | OUTPATIENT
Start: 2022-01-24

## 2022-01-27 NOTE — TELEPHONE ENCOUNTER
[facility] Transitions Follow Up Call    2022    Patient: Sukumar Henry  Patient : 1955   MRN: [id number]  Reason for Admission: Impaired mobility and ADL's d/t late effects of CVA  Discharge Date: 22 RARS: Readmission Risk Score: 11.2 ( )    Care Transitions Follow Up Call    Needs to be reviewed by the provider   Additional needs identified to be addressed with provider: No  none             Method of communication with provider : none      Care Transition Nurse (CTN) contacted the family by telephone to follow up after admission on 22. Verified name and  with family as identifiers. Addressed changes since last contact: none  Discussed follow-up appointments. If no appointment was previously scheduled, appointment scheduling offered: CTN confirmed with patient son Elsie Lr) that patient  was scheduled for HFU appt with Dr. Carlyn Davis (PCP) but needed to r/s. States patient was scheduled for visit today but r/s because of cold weather and is now scheduled for visit on 22 at 10 am.     Is follow up appointment scheduled within 7 days of discharge? No.    Advance Care Planning:   Does patient have an Advance Directive: reviewed and current. CTN reviewed discharge instructions, medical action plan and red flags with family and discussed any barriers to care and/or understanding of plan of care after discharge. Discussed appropriate site of care based on symptoms and resources available to patient including: PCP, Specialist, Urgent care clinics and When to call 911. The family agrees to contact the PCP office for questions related to their healthcare. Patients top risk factors for readmission: functional physical ability  medical condition-CVA, seizure and chronic pain  polypharmacy     Plan for follow-up call in 7-10 days based on severity of symptoms and risk factors.     Care Transitions Subsequent and Final Call    Subsequent and Final Calls  Do you have any ongoing symptoms?: pt states he is returning Dr. Mcfarlane Emanuel phone call. He thinks its for biopsy results. No  Have your medications changed?: No  Do you have any questions related to your medications?: No  Do you currently have any active services?: No  Do you have any needs or concerns that I can assist you with?: No  Identified Barriers: Lack of Motivation  Care Transitions Interventions  No Identified Needs  Other Interventions:         Spoke with patient son Elsie Lr) today 1/27/22 for hospital discharge follow up sub call. Cristo Hong states patient continues with baseline activity and continues working with OP therapy. States patient has chronic right sided weakness from stroke and utilizes a quad cane when walking. Denies any new symptoms or falls. [facility] [place] [facility] patent is tolerating Keppra twice daily that was ordered on discharge. Denies any seizure-like symptoms. [facility] [place] [facility] patient was scheduled to follow up with PCP today but canceled d/t cold weather and r/c for 2/4/22. Cristo Hong states not taking patient out in cold weather and will do a Mycahrt follow up next week. Denies any complaints or concerns at this time. CTN will continue to follow for Care Transition. Follow Up  Future Appointments   Date Time Provider Radha Doll   1/27/2022  1:45 PM OSCAR Hussein MLOZ AM [address] [place] [address],[address]   1/27/2022  2:30 PM Ronan Gonzalez OTR/L [address] [facility],[address]Th Floor   2/4/2022 10:00 AM Rogelio Gamboa MD [facility] [place]     CTN provided contact information for future needs.     JULITA Coleman

## 2022-01-28 ENCOUNTER — TELEPHONE (OUTPATIENT)
Dept: INTERNAL MEDICINE CLINIC | Facility: CLINIC | Age: 87
End: 2022-01-28

## 2022-01-28 NOTE — TELEPHONE ENCOUNTER
Jose/[facility] requesting verbal order to re-certify patient for Physical Therapy    Planning to see patient 1-2 times per week for next 6-7 weeks  Tasked to nursing

## 2022-01-28 NOTE — TELEPHONE ENCOUNTER
As FYI to DR. LIV resendiz from [facility] and gave verbal approval for plan of care per protocol - verbalized understanding

## 2022-01-31 ENCOUNTER — TELEPHONE (OUTPATIENT)
Dept: INTERNAL MEDICINE CLINIC | Facility: CLINIC | Age: 87
End: 2022-01-31

## 2022-01-31 NOTE — TELEPHONE ENCOUNTER
Original Disability Certification letter sent to [address], copies to scanning and as reuested, copy mailed to there residence.

## 2022-01-31 NOTE — TELEPHONE ENCOUNTER
Patient mailed parking placard to be filled out  Patient's wife Stephanie Grayson is requesting the completed form be mailed to their home    Also requesting Permanent disability be checked on the form  Form placed in Dr Edward Olmos mail slot

## 2022-02-03 NOTE — TELEPHONE ENCOUNTER
Ana Griffin is calling from [facility]. Ana Griffin is calling to inform doctor of plan of care for Physical Therapy.   Plan is as follows    1 X for 4 weeks  And  1 X every other week for 2 weeks    Any questions please call Ana Griffin at [phone]

## 2022-02-03 NOTE — TELEPHONE ENCOUNTER
When was the last Hemoglobin A1C & what was the result    When will pt be due for another    Call back to Tono Gilbert at [facility]     [phone]

## 2022-02-04 NOTE — TELEPHONE ENCOUNTER
Called astrid from [facility] and relayed result of last A1C from December and relayed DR. PECK message - verbalized understanding

## 2022-02-04 NOTE — TELEPHONE ENCOUNTER
Okay to release information regarding hemoglobin A1c.   Next hemoglobin A1c will be done 6 months from last i.e. June or July of this year

## 2022-02-04 NOTE — TELEPHONE ENCOUNTER
Please advise - last A1C done 12/6/21 - 6.5 . [facility] wants to know results and when next one is due - to DR. PECK

## 2022-02-11 RX ORDER — CLOPIDOGREL BISULFATE 75 MG/1
TABLET ORAL
Qty: 90 TABLET | Refills: 0 | Status: SHIPPED | OUTPATIENT
Start: 2022-02-11

## 2022-02-17 ENCOUNTER — TELEPHONE (OUTPATIENT)
Dept: INTERNAL MEDICINE CLINIC | Facility: CLINIC | Age: 87
End: 2022-02-17

## 2022-02-17 NOTE — TELEPHONE ENCOUNTER
Dre faxed Diabetic Detailed Written order     \"please verify whether the information on the form is correct and sign and date and return the form for Medicare billing. Please initial and date all changes. \"    Fax placed in blue folder    Tasked to [employer]

## 2022-02-25 NOTE — TELEPHONE ENCOUNTER
Dre fax over a request to fax the Diabetic Detailed Order Form to: [phone]    The original was sent to: [phone]    Please resend    Fax placed in blue folder

## 2022-02-28 NOTE — TELEPHONE ENCOUNTER
Dre sent over a Prior Authorization for:    [address] [place] [address] 100[address]    Plan does not cover this medication. Please call [phone].     Patient ID [id number]    Placed in purple folder

## 2022-03-01 NOTE — TELEPHONE ENCOUNTER
[place] not approved unless pt legally blind. Pt will need to switch to alternate covered brand. MYchart to pt to call insurance for list of alternatives.

## 2022-03-03 NOTE — TELEPHONE ENCOUNTER
Re-faxed again! Located form from [employer] in [address] [place] [address] 'Diabetic Detailed Written Order'. Filled in blanks / completed. [Date of Order: 11/21/21]     Seeing the following Diabetic Supplies:  -Glucose test Strips  -Lancets  -Meter   -Lancet Device      Faxed form with provider's signature and date to [phone]. Received fax transmission \"success\". Copy sent to scanning. If copy not received and another duplicate request is sent via fax --- Please see 'media tab', 'diabetic management' and refax.

## 2022-03-07 ENCOUNTER — LAB ENCOUNTER (OUTPATIENT)
Dept: LAB | Age: 87
End: 2022-03-07
Attending: INTERNAL MEDICINE
Payer: MEDICARE

## 2022-03-07 ENCOUNTER — OFFICE VISIT (OUTPATIENT)
Dept: INTERNAL MEDICINE CLINIC | Facility: CLINIC | Age: 87
End: 2022-03-07
Payer: MEDICARE

## 2022-03-07 VITALS
OXYGEN SATURATION: 98 % | BODY MASS INDEX: 27.58 KG/M2 | SYSTOLIC BLOOD PRESSURE: 108 MMHG | WEIGHT: 192.69 LBS | HEIGHT: 70 IN | HEART RATE: 88 BPM | DIASTOLIC BLOOD PRESSURE: 52 MMHG | RESPIRATION RATE: 16 BRPM | TEMPERATURE: 98 F

## 2022-03-07 DIAGNOSIS — E11.9 CONTROLLED TYPE 2 DIABETES MELLITUS WITHOUT COMPLICATION, WITHOUT LONG-TERM CURRENT USE OF INSULIN (HCC): ICD-10-CM

## 2022-03-07 DIAGNOSIS — F02.80 DEMENTIA ASSOCIATED WITH OTHER UNDERLYING DISEASE WITHOUT BEHAVIORAL DISTURBANCE (HCC): ICD-10-CM

## 2022-03-07 DIAGNOSIS — Z98.890 HISTORY OF LEFT COMMON CAROTID ARTERY STENT PLACEMENT: ICD-10-CM

## 2022-03-07 DIAGNOSIS — I63.512 CEREBROVASCULAR ACCIDENT (CVA) DUE TO OCCLUSION OF LEFT MIDDLE CEREBRAL ARTERY (HCC): ICD-10-CM

## 2022-03-07 DIAGNOSIS — Z12.5 PROSTATE CANCER SCREENING: ICD-10-CM

## 2022-03-07 DIAGNOSIS — Z00.00 ROUTINE HEALTH MAINTENANCE: ICD-10-CM

## 2022-03-07 DIAGNOSIS — Z95.1 HX OF CABG: ICD-10-CM

## 2022-03-07 DIAGNOSIS — Z95.828 HISTORY OF LEFT COMMON CAROTID ARTERY STENT PLACEMENT: ICD-10-CM

## 2022-03-07 DIAGNOSIS — I35.1 NONRHEUMATIC AORTIC VALVE INSUFFICIENCY: ICD-10-CM

## 2022-03-07 DIAGNOSIS — Z00.00 MEDICARE ANNUAL WELLNESS VISIT, SUBSEQUENT: Primary | ICD-10-CM

## 2022-03-07 DIAGNOSIS — I82.412 DEEP VENOUS THROMBOSIS OF LEFT PROFUNDA FEMORIS VEIN (HCC): ICD-10-CM

## 2022-03-07 DIAGNOSIS — I10 HYPERTENSION, BENIGN: ICD-10-CM

## 2022-03-07 PROBLEM — L02.214 INGUINAL ABSCESS: Status: RESOLVED | Noted: 2017-10-05 | Resolved: 2022-03-07

## 2022-03-07 PROBLEM — T85.79XD INFECTED HERNIOPLASTY MESH, SUBSEQUENT ENCOUNTER: Status: RESOLVED | Noted: 2017-10-25 | Resolved: 2022-03-07

## 2022-03-07 PROBLEM — A49.02 MRSA INFECTION: Status: RESOLVED | Noted: 2017-11-17 | Resolved: 2022-03-07

## 2022-03-07 PROBLEM — A49.02 MRSA INFECTION: Status: RESOLVED | Noted: 2017-11-17 | Resolved: 2022-01-01

## 2022-03-07 PROBLEM — L02.214 INGUINAL ABSCESS: Status: RESOLVED | Noted: 2017-10-05 | Resolved: 2022-01-01

## 2022-03-07 PROBLEM — T85.79XD INFECTED HERNIOPLASTY MESH, SUBSEQUENT ENCOUNTER: Status: RESOLVED | Noted: 2017-10-25 | Resolved: 2022-01-01

## 2022-03-07 LAB
ALBUMIN SERPL-MCNC: 3.7 G/DL (ref 3.4–5)
ALBUMIN/GLOB SERPL: 1.2 {RATIO} (ref 1–2)
ALP LIVER SERPL-CCNC: 100 U/L
ALT SERPL-CCNC: 34 U/L
ANION GAP SERPL CALC-SCNC: 6 MMOL/L (ref 0–18)
AST SERPL-CCNC: 21 U/L (ref 15–37)
BASOPHILS # BLD AUTO: 0.06 X10(3) UL (ref 0–0.2)
BASOPHILS NFR BLD AUTO: 0.8 %
BILIRUB SERPL-MCNC: 0.6 MG/DL (ref 0.1–2)
BILIRUB UR QL: NEGATIVE
BUN BLD-MCNC: 13 MG/DL (ref 7–18)
BUN/CREAT SERPL: 15.5 (ref 10–20)
CALCIUM BLD-MCNC: 9.4 MG/DL (ref 8.5–10.1)
CHLORIDE SERPL-SCNC: 104 MMOL/L (ref 98–112)
CHOLEST SERPL-MCNC: 121 MG/DL (ref ?–200)
CO2 SERPL-SCNC: 28 MMOL/L (ref 21–32)
COLOR UR: YELLOW
COMPLEXED PSA SERPL-MCNC: 0.48 NG/ML (ref ?–4)
CREAT BLD-MCNC: 0.84 MG/DL
CREAT UR-SCNC: 156 MG/DL
DEPRECATED RDW RBC AUTO: 48.5 FL (ref 35.1–46.3)
EOSINOPHIL # BLD AUTO: 0.09 X10(3) UL (ref 0–0.7)
EOSINOPHIL NFR BLD AUTO: 1.2 %
ERYTHROCYTE [DISTWIDTH] IN BLOOD BY AUTOMATED COUNT: 14.5 % (ref 11–15)
EST. AVERAGE GLUCOSE BLD GHB EST-MCNC: 146 MG/DL (ref 68–126)
FASTING PATIENT LIPID ANSWER: NO
FASTING STATUS PATIENT QL REPORTED: NO
GLOBULIN PLAS-MCNC: 3.1 G/DL (ref 2.8–4.4)
GLUCOSE BLD-MCNC: 98 MG/DL (ref 70–99)
GLUCOSE UR-MCNC: NEGATIVE MG/DL
HBA1C MFR BLD: 6.7 % (ref ?–5.7)
HCT VFR BLD AUTO: 43.5 %
HDLC SERPL-MCNC: 43 MG/DL (ref 40–59)
HGB BLD-MCNC: 13.8 G/DL
HGB UR QL STRIP.AUTO: NEGATIVE
IMM GRANULOCYTES # BLD AUTO: 0.03 X10(3) UL (ref 0–1)
IMM GRANULOCYTES NFR BLD: 0.4 %
KETONES UR-MCNC: NEGATIVE MG/DL
LDLC SERPL CALC-MCNC: 58 MG/DL (ref ?–100)
LYMPHOCYTES # BLD AUTO: 1.04 X10(3) UL (ref 1–4)
LYMPHOCYTES NFR BLD AUTO: 13.5 %
MCH RBC QN AUTO: 28.8 PG (ref 26–34)
MCHC RBC AUTO-ENTMCNC: 31.7 G/DL (ref 31–37)
MICROALBUMIN UR-MCNC: 0.71 MG/DL
MONOCYTES # BLD AUTO: 0.6 X10(3) UL (ref 0.1–1)
MONOCYTES NFR BLD AUTO: 7.8 %
NEUTROPHILS # BLD AUTO: 5.91 X10 (3) UL (ref 1.5–7.7)
NEUTROPHILS # BLD AUTO: 5.91 X10(3) UL (ref 1.5–7.7)
NEUTROPHILS NFR BLD AUTO: 76.3 %
NITRITE UR QL STRIP.AUTO: NEGATIVE
NONHDLC SERPL-MCNC: 78 MG/DL (ref ?–130)
OSMOLALITY SERPL CALC.SUM OF ELEC: 286 MOSM/KG (ref 275–295)
PH UR: 7 [PH] (ref 5–8)
PLATELET # BLD AUTO: 211 10(3)UL (ref 150–450)
POTASSIUM SERPL-SCNC: 4.2 MMOL/L (ref 3.5–5.1)
PROT SERPL-MCNC: 6.8 G/DL (ref 6.4–8.2)
PROT UR-MCNC: 30 MG/DL
RBC # BLD AUTO: 4.8 X10(6)UL
SODIUM SERPL-SCNC: 138 MMOL/L (ref 136–145)
SP GR UR STRIP: 1.02 (ref 1–1.03)
TRIGL SERPL-MCNC: 109 MG/DL (ref 30–149)
UROBILINOGEN UR STRIP-ACNC: <2
VIT C UR-MCNC: NEGATIVE MG/DL
VLDLC SERPL CALC-MCNC: 16 MG/DL (ref 0–30)
WBC # BLD AUTO: 7.7 X10(3) UL (ref 4–11)

## 2022-03-07 PROCEDURE — 80061 LIPID PANEL: CPT

## 2022-03-07 PROCEDURE — 84075 ASSAY ALKALINE PHOSPHATASE: CPT

## 2022-03-07 PROCEDURE — 80053 COMPREHEN METABOLIC PANEL: CPT

## 2022-03-07 PROCEDURE — 82043 UR ALBUMIN QUANTITATIVE: CPT

## 2022-03-07 PROCEDURE — 81001 URINALYSIS AUTO W/SCOPE: CPT | Performed by: INTERNAL MEDICINE

## 2022-03-07 PROCEDURE — G0439 PPPS, SUBSEQ VISIT: HCPCS | Performed by: INTERNAL MEDICINE

## 2022-03-07 PROCEDURE — 87077 CULTURE AEROBIC IDENTIFY: CPT | Performed by: INTERNAL MEDICINE

## 2022-03-07 PROCEDURE — 36415 COLL VENOUS BLD VENIPUNCTURE: CPT

## 2022-03-07 PROCEDURE — 99214 OFFICE O/P EST MOD 30 MIN: CPT | Performed by: INTERNAL MEDICINE

## 2022-03-07 PROCEDURE — 87086 URINE CULTURE/COLONY COUNT: CPT | Performed by: INTERNAL MEDICINE

## 2022-03-07 PROCEDURE — 83036 HEMOGLOBIN GLYCOSYLATED A1C: CPT

## 2022-03-07 PROCEDURE — 84080 ASSAY ALKALINE PHOSPHATASES: CPT

## 2022-03-07 PROCEDURE — 85025 COMPLETE CBC W/AUTO DIFF WBC: CPT

## 2022-03-07 PROCEDURE — 82570 ASSAY OF URINE CREATININE: CPT

## 2022-03-08 RX ORDER — TAMSULOSIN HYDROCHLORIDE 0.4 MG/1
CAPSULE ORAL
Qty: 90 CAPSULE | Refills: 0 | Status: SHIPPED | OUTPATIENT
Start: 2022-03-08

## 2022-03-08 RX ORDER — FUROSEMIDE 20 MG/1
TABLET ORAL
Qty: 90 TABLET | Refills: 0 | Status: SHIPPED | OUTPATIENT
Start: 2022-03-08

## 2022-03-10 LAB
ALK-PHOSPHATASE BONE CALC: 25 U/L
ALK-PHOSPHATASE LIVER CALC: 75 U/L
ALK-PHOSPHATASE OTHER CALC: 0 U/L
ALKALINE PHOSPHATASE: 100 U/L

## 2022-03-11 ENCOUNTER — TELEPHONE (OUTPATIENT)
Dept: INTERNAL MEDICINE CLINIC | Facility: CLINIC | Age: 87
End: 2022-03-11

## 2022-03-11 NOTE — TELEPHONE ENCOUNTER
Please let wife, Jody Restrepo, note that 's labs came out satisfactory. Hemoglobin A1c 6.7 indicating diabetes is under good control. Cholesterol is in a very good range at 121. His electrolytes all look good. He did have a small amount of white cells in the urine and some bacteria but I think the bacteria is more of a \"contaminant\" rather than causing any infection. As long as Jean does not have any burning or frequency in his urine I do not think we need to treat this as the bacteria may be more of a skin contaminant than creating any problems with his bladder.

## 2022-03-11 NOTE — TELEPHONE ENCOUNTER
Called patient spoke with spouse per hipaa and relayed DR. PECK message - verbalized understanding.  Patient does NOT have any UTI symptoms

## 2022-03-12 RX ORDER — METOPROLOL TARTRATE 50 MG/1
TABLET, FILM COATED ORAL
Qty: 180 TABLET | Refills: 1 | Status: SHIPPED | OUTPATIENT
Start: 2022-03-12

## 2022-03-23 ENCOUNTER — TELEPHONE (OUTPATIENT)
Dept: INTERNAL MEDICINE CLINIC | Facility: CLINIC | Age: 87
End: 2022-03-23

## 2022-03-23 NOTE — TELEPHONE ENCOUNTER
To nursing,  Take an additional furosemide 20 mg a day and discuss with Dr. Cielo Gudino tomorrow. However if he is short of breath, he will need to go to the emergency room today. Thanks. Routed to nursing and to Dr. Cielo Gudino.

## 2022-03-23 NOTE — TELEPHONE ENCOUNTER
Called spouse per hipaa and relayed DR. STRONG message - verbalized understanding ( patient not short of breath)

## 2022-03-23 NOTE — TELEPHONE ENCOUNTER
Please advise for DR. PECK - called spouse per hipaa who states patients feet are now swollen to mid calves - he is on 20 mg Furosemide daily - to DR. Twyla Lundborg

## 2022-03-23 NOTE — TELEPHONE ENCOUNTER
Please call patient wife, Jody Restrepo  Pt fluid retention in his legs is increasing, above ankles now  Please call to discuss/advise  Tasked to nursing

## 2022-03-24 NOTE — TELEPHONE ENCOUNTER
Spouse returned call back today. Nurse Unavailable. Advised spouse that a call will be returned back to her.  Please call spouse back at [phone]

## 2022-03-24 NOTE — TELEPHONE ENCOUNTER
To Dr. Frederick James----    Spoke to pt spouse (ok per hipaa) who reports pt was given extra 20mg lasix yesterday per on call instructions and swelling seems to have improved some; states pitting edema has improved and swelling has receded down legs. Spouse concerned as pt has been extremely fatigued and sluggish since last night. Reports he slept all night and has been sleeping all day and only gets up to eat and go to the bathroom. Denies altered mental status, redness or pain to legs/oozing or weeping, confusion or slurring of words. Spouse is looking for direction. Thanks!

## 2022-03-24 NOTE — TELEPHONE ENCOUNTER
Discussed with wife. Patient sleeping all the time. She will check his blood sugar. Otherwise there is no obvious causes such as fevers, chest pain, shortness of breath GI or  symptoms. She will keep an eye on him and let us know how he does. Does not seem like he would have to go to the emergency room. We had just seen him in the office also.

## 2022-03-29 NOTE — TELEPHONE ENCOUNTER
Triny Mota / [facility] calling to request a verbal order to discharge patient from agency this week. Please call [phone], confidential voicemail.

## 2022-04-04 ENCOUNTER — TELEPHONE (OUTPATIENT)
Dept: INTERNAL MEDICINE CLINIC | Facility: CLINIC | Age: 87
End: 2022-04-04

## 2022-04-04 NOTE — TELEPHONE ENCOUNTER
Dre sent over a Prior Authorization for:    [address] [facility] [address]    Call plan at  [phone]  Patient ID [id number]    Fax placed in purple folder

## 2022-04-11 NOTE — TELEPHONE ENCOUNTER
Patients wife Franco Leon is calling and states that the patient Jean has dementia and has been waking up at night and wanted to leave and go somewhere. Franco Leon states this has been happening about once a week. She is looking for a sleep aid or something she can give him to relax and get back to sleep that is fast acting. Does not want to give it to the patient daily. Please call and advise.     Ok to send to Guru Rashid in [facility]

## 2022-04-15 NOTE — TELEPHONE ENCOUNTER
Discussed with wife. Discussed options. Discussed I would like to stay away from the benzo diazepam's. Also stay away from the prescription sleep aids for fear of reverse side effects such as more agitation. Also risk of falling. I did discuss that Seroquel can be used. It is approved for behavioral disturbances in people with dementia. She will discuss with family members. We would start with a low-dose Seroquel 25 mg nightly.     She verbalized understanding

## 2022-04-29 NOTE — TELEPHONE ENCOUNTER
[employer], [facility] requesting Prior Authorization for:  Softclix Lancets  Please call [phone], pt ID#:  [id number]  Fax placed in purple folder  Tasked to RX

## 2022-05-13 RX ORDER — CLOPIDOGREL BISULFATE 75 MG/1
TABLET ORAL
Qty: 90 TABLET | Refills: 3 | Status: SHIPPED | OUTPATIENT
Start: 2022-05-13

## 2022-06-05 NOTE — TELEPHONE ENCOUNTER
To Dr. Lacey Palomino for periodic review after increase in leg swelling addressed by Dr. Staci Harley back in March 2022

## 2022-07-16 RX ORDER — FINASTERIDE 5 MG/1
TABLET, FILM COATED ORAL
Qty: 90 TABLET | Refills: 3 | Status: SHIPPED | OUTPATIENT
Start: 2022-07-16

## 2022-07-27 RX ORDER — IPRATROPIUM BROMIDE AND ALBUTEROL SULFATE 2.5; .5 MG/3ML; MG/3ML
SOLUTION RESPIRATORY (INHALATION)
Qty: 180 ML | Refills: 0 | Status: SHIPPED | OUTPATIENT
Start: 2022-07-27

## 2022-08-02 ENCOUNTER — TELEPHONE (OUTPATIENT)
Dept: INTERNAL MEDICINE CLINIC | Facility: CLINIC | Age: 87
End: 2022-08-02

## 2022-08-02 ENCOUNTER — LAB ENCOUNTER (OUTPATIENT)
Dept: LAB | Age: 87
End: 2022-08-02
Attending: INTERNAL MEDICINE
Payer: MEDICARE

## 2022-08-02 ENCOUNTER — OFFICE VISIT (OUTPATIENT)
Dept: INTERNAL MEDICINE CLINIC | Facility: CLINIC | Age: 87
End: 2022-08-02
Payer: MEDICARE

## 2022-08-02 VITALS
HEART RATE: 70 BPM | WEIGHT: 192 LBS | OXYGEN SATURATION: 96 % | HEIGHT: 70 IN | SYSTOLIC BLOOD PRESSURE: 130 MMHG | BODY MASS INDEX: 27.49 KG/M2 | DIASTOLIC BLOOD PRESSURE: 58 MMHG

## 2022-08-02 DIAGNOSIS — Z95.1 HX OF CABG: ICD-10-CM

## 2022-08-02 DIAGNOSIS — E11.9 CONTROLLED TYPE 2 DIABETES MELLITUS WITHOUT COMPLICATION, WITHOUT LONG-TERM CURRENT USE OF INSULIN (HCC): ICD-10-CM

## 2022-08-02 DIAGNOSIS — I35.1 NONRHEUMATIC AORTIC VALVE INSUFFICIENCY: ICD-10-CM

## 2022-08-02 DIAGNOSIS — I10 HYPERTENSION, BENIGN: Primary | ICD-10-CM

## 2022-08-02 DIAGNOSIS — Z98.890 HISTORY OF LEFT COMMON CAROTID ARTERY STENT PLACEMENT: ICD-10-CM

## 2022-08-02 DIAGNOSIS — Z23 NEED FOR VACCINATION AGAINST STREPTOCOCCUS PNEUMONIAE: ICD-10-CM

## 2022-08-02 DIAGNOSIS — F02.80 DEMENTIA ASSOCIATED WITH OTHER UNDERLYING DISEASE WITHOUT BEHAVIORAL DISTURBANCE (HCC): ICD-10-CM

## 2022-08-02 DIAGNOSIS — I82.412 DEEP VENOUS THROMBOSIS OF LEFT PROFUNDA FEMORIS VEIN (HCC): ICD-10-CM

## 2022-08-02 DIAGNOSIS — I63.512 CEREBROVASCULAR ACCIDENT (CVA) DUE TO OCCLUSION OF LEFT MIDDLE CEREBRAL ARTERY (HCC): ICD-10-CM

## 2022-08-02 DIAGNOSIS — Z95.828 HISTORY OF LEFT COMMON CAROTID ARTERY STENT PLACEMENT: ICD-10-CM

## 2022-08-02 DIAGNOSIS — Z00.00 ROUTINE HEALTH MAINTENANCE: ICD-10-CM

## 2022-08-02 LAB
ANION GAP SERPL CALC-SCNC: 7 MMOL/L (ref 0–18)
BUN BLD-MCNC: 11 MG/DL (ref 7–18)
BUN/CREAT SERPL: 12.5 (ref 10–20)
CALCIUM BLD-MCNC: 9.7 MG/DL (ref 8.5–10.1)
CHLORIDE SERPL-SCNC: 106 MMOL/L (ref 98–112)
CO2 SERPL-SCNC: 28 MMOL/L (ref 21–32)
CREAT BLD-MCNC: 0.88 MG/DL
EST. AVERAGE GLUCOSE BLD GHB EST-MCNC: 143 MG/DL (ref 68–126)
FASTING STATUS PATIENT QL REPORTED: YES
GFR SERPLBLD BASED ON 1.73 SQ M-ARVRAT: 83 ML/MIN/1.73M2 (ref 60–?)
GLUCOSE BLD-MCNC: 101 MG/DL (ref 70–99)
HBA1C MFR BLD: 6.6 % (ref ?–5.7)
OSMOLALITY SERPL CALC.SUM OF ELEC: 292 MOSM/KG (ref 275–295)
POTASSIUM SERPL-SCNC: 4.3 MMOL/L (ref 3.5–5.1)
SODIUM SERPL-SCNC: 141 MMOL/L (ref 136–145)

## 2022-08-02 PROCEDURE — 83036 HEMOGLOBIN GLYCOSYLATED A1C: CPT

## 2022-08-02 PROCEDURE — 36415 COLL VENOUS BLD VENIPUNCTURE: CPT

## 2022-08-02 PROCEDURE — 99214 OFFICE O/P EST MOD 30 MIN: CPT | Performed by: INTERNAL MEDICINE

## 2022-08-02 PROCEDURE — 1125F AMNT PAIN NOTED PAIN PRSNT: CPT | Performed by: INTERNAL MEDICINE

## 2022-08-02 PROCEDURE — G0009 ADMIN PNEUMOCOCCAL VACCINE: HCPCS | Performed by: INTERNAL MEDICINE

## 2022-08-02 PROCEDURE — 90732 PPSV23 VACC 2 YRS+ SUBQ/IM: CPT | Performed by: INTERNAL MEDICINE

## 2022-08-02 PROCEDURE — 80048 BASIC METABOLIC PNL TOTAL CA: CPT

## 2022-08-02 NOTE — TELEPHONE ENCOUNTER
Please let patient's wife know that seems labs are good. Diabetes under good control. I think she can decrease the amount of time she checks his blood sugar. I think just 2 or 3 times a week would suffice because if there would be a trend going higher we would be able to identify it fairly quickly.

## 2022-08-25 RX ORDER — LANCING DEVICE/LANCETS
KIT MISCELLANEOUS
Qty: 1 KIT | Refills: 1 | Status: CANCELLED | OUTPATIENT
Start: 2022-08-25

## 2022-08-25 NOTE — TELEPHONE ENCOUNTER
Refill request rec'd from [facility] for:  Softclix Lancets   [facility] requires new prescription every 6 month\"  Fax placed in blue folder  Tasked to [employer]

## 2022-08-26 RX ORDER — LANCETS
EACH MISCELLANEOUS
Qty: 100 EACH | Refills: 3 | Status: SHIPPED | OUTPATIENT
Start: 2022-08-26

## 2022-09-01 RX ORDER — METOPROLOL TARTRATE 50 MG/1
TABLET, FILM COATED ORAL
Qty: 180 TABLET | Refills: 3 | Status: SHIPPED | OUTPATIENT
Start: 2022-09-01

## 2022-09-02 ENCOUNTER — TELEPHONE (OUTPATIENT)
Dept: INTERNAL MEDICINE CLINIC | Facility: CLINIC | Age: 87
End: 2022-09-02

## 2022-09-14 ENCOUNTER — TELEPHONE (OUTPATIENT)
Dept: PULMONOLOGY | Facility: CLINIC | Age: 87
End: 2022-09-14

## 2022-09-14 NOTE — TELEPHONE ENCOUNTER
Patient's wife is calling because she cancelled patient's appointment and needed something for a Tuesday. offered first available on Tuesday but it was to far and would like something sooner.

## 2022-09-15 NOTE — TELEPHONE ENCOUNTER
Spoke with patient's wife follow up appointment scheduled with Dr. Juan Diego Mcdonald on 9/27/22 at 10:50am.  Verified date, time, location & parking, wife verbalized understanding.

## 2022-09-20 ENCOUNTER — OFFICE VISIT (OUTPATIENT)
Dept: INTERNAL MEDICINE CLINIC | Facility: CLINIC | Age: 87
End: 2022-09-20

## 2022-09-20 ENCOUNTER — HOSPITAL ENCOUNTER (OUTPATIENT)
Dept: CT IMAGING | Facility: HOSPITAL | Age: 87
Discharge: HOME | End: 2022-09-20
Attending: INTERNAL MEDICINE

## 2022-09-20 VITALS
WEIGHT: 197 LBS | TEMPERATURE: 98 F | OXYGEN SATURATION: 95 % | HEIGHT: 70 IN | HEART RATE: 76 BPM | BODY MASS INDEX: 28.2 KG/M2 | SYSTOLIC BLOOD PRESSURE: 126 MMHG | DIASTOLIC BLOOD PRESSURE: 58 MMHG

## 2022-09-20 DIAGNOSIS — I10 HYPERTENSION, BENIGN: ICD-10-CM

## 2022-09-20 DIAGNOSIS — Z95.1 HX OF CABG: ICD-10-CM

## 2022-09-20 DIAGNOSIS — Z74.09 MOBILITY IMPAIRED: ICD-10-CM

## 2022-09-20 DIAGNOSIS — R29.898 RIGHT LEG WEAKNESS: ICD-10-CM

## 2022-09-20 DIAGNOSIS — I63.512 CEREBROVASCULAR ACCIDENT (CVA) DUE TO OCCLUSION OF LEFT MIDDLE CEREBRAL ARTERY (HCC): ICD-10-CM

## 2022-09-20 DIAGNOSIS — Z00.00 ROUTINE HEALTH MAINTENANCE: ICD-10-CM

## 2022-09-20 DIAGNOSIS — I35.1 NONRHEUMATIC AORTIC VALVE INSUFFICIENCY: ICD-10-CM

## 2022-09-20 DIAGNOSIS — Z91.81 AT RISK FOR FALLS: ICD-10-CM

## 2022-09-20 DIAGNOSIS — R60.0 BILATERAL LOWER EXTREMITY EDEMA: ICD-10-CM

## 2022-09-20 DIAGNOSIS — I82.412 DEEP VENOUS THROMBOSIS OF LEFT PROFUNDA FEMORIS VEIN (HCC): ICD-10-CM

## 2022-09-20 DIAGNOSIS — I69.30 HISTORY OF ISCHEMIC CEREBROVASCULAR ACCIDENT (CVA) WITH RESIDUAL DEFICIT: Primary | ICD-10-CM

## 2022-09-20 DIAGNOSIS — Z95.828 HISTORY OF LEFT COMMON CAROTID ARTERY STENT PLACEMENT: ICD-10-CM

## 2022-09-20 DIAGNOSIS — F02.80 DEMENTIA ASSOCIATED WITH OTHER UNDERLYING DISEASE WITHOUT BEHAVIORAL DISTURBANCE (HCC): ICD-10-CM

## 2022-09-20 DIAGNOSIS — E11.9 CONTROLLED TYPE 2 DIABETES MELLITUS WITHOUT COMPLICATION, WITHOUT LONG-TERM CURRENT USE OF INSULIN (HCC): ICD-10-CM

## 2022-09-20 DIAGNOSIS — Z98.890 HISTORY OF LEFT COMMON CAROTID ARTERY STENT PLACEMENT: ICD-10-CM

## 2022-09-20 PROCEDURE — 70450 CT HEAD/BRAIN W/O DYE: CPT | Performed by: INTERNAL MEDICINE

## 2022-09-20 PROCEDURE — 93000 ELECTROCARDIOGRAM COMPLETE: CPT | Performed by: INTERNAL MEDICINE

## 2022-09-23 ENCOUNTER — TELEPHONE (OUTPATIENT)
Dept: INTERNAL MEDICINE CLINIC | Facility: CLINIC | Age: 87
End: 2022-09-23

## 2022-09-23 NOTE — TELEPHONE ENCOUNTER
Home health ordered that was printed by Dr Jonas Jarquin and last ov 9/20/22 faxed to [facility] [phone]. Fax confirmation received.

## 2022-09-26 NOTE — TELEPHONE ENCOUNTER
Desirae Segura / Kellen is calling to request a nurse call her regarding [facility] order    Please call [phone]

## 2022-09-26 NOTE — TELEPHONE ENCOUNTER
Spoke with intake nurse, Lindsay Buchanan. PCP needs to addend 9/20/22 OV note in order to meet medicare requirements. [facility] can use either Dementia or CVA as primary diagnosis, however, documentation needs to altered. Option 1: CVA: elaborate on RT-sided residual weakness since CVA is an old diagnosis. Option 2: Dementia: elaborate on etiology (ex: Alzheimer's, Parkinson's or Vascular dementia).  does not excepts just dementia. Fax to [phone]    To Dr. Beverley Anderson: HHC cannot be initiated until OV is addended.  Lindsay Buchanan was made aware that you're not in the office until 10/3/22

## 2022-09-27 ENCOUNTER — OFFICE VISIT (OUTPATIENT)
Dept: PULMONOLOGY | Facility: CLINIC | Age: 87
End: 2022-09-27

## 2022-09-27 VITALS
WEIGHT: 197 LBS | BODY MASS INDEX: 28.2 KG/M2 | SYSTOLIC BLOOD PRESSURE: 121 MMHG | HEIGHT: 70 IN | DIASTOLIC BLOOD PRESSURE: 55 MMHG | HEART RATE: 64 BPM

## 2022-09-27 DIAGNOSIS — R06.00 DYSPNEA, UNSPECIFIED TYPE: Primary | ICD-10-CM

## 2022-09-27 PROCEDURE — 99213 OFFICE O/P EST LOW 20 MIN: CPT | Performed by: INTERNAL MEDICINE

## 2022-09-27 PROCEDURE — 1125F AMNT PAIN NOTED PAIN PRSNT: CPT | Performed by: INTERNAL MEDICINE

## 2022-09-27 NOTE — PROGRESS NOTES
Referring Physician  Karyle Britain, MD    History of Present Illness  Patient seen today for follow-up visit to pulmonary clinic. Dyspnea overall improved after starting nebulizer treatments which she uses once to twice daily. Occasional cough present. No significant wheezing. No significant past colonization secondary dyspnea. Medications  METOPROLOL TARTRATE 50 MG Oral Tab, TAKE ONE TABLET BY MOUTH TWICE DAILY with meals, Disp: 180 tablet, Rfl: 3  Accu-Chek Softclix Lancets Does not apply Misc, USE TO TEST BLOOD SUGAR ONCE DAILY, Disp: 100 each, Rfl: 3  ipratropium-albuterol 0.5-2.5 (3) MG/3ML Inhalation Solution, USE 1 VIAL VIA NEBULIZER TWICE DAILY, Disp: 180 mL, Rfl: 0  FINASTERIDE 5 MG Oral Tab, TAKE ONE TABLET BY MOUTH ONE TIME DAILY, Disp: 90 tablet, Rfl: 3  FUROSEMIDE 20 MG Oral Tab, TAKE ONE TABLET BY MOUTH ONE TIME DAILY, Disp: 90 tablet, Rfl: 3  TAMSULOSIN 0.4 MG Oral Cap, TAKE ONE CAPSULE BY MOUTH NIGHTLY, Disp: 90 capsule, Rfl: 3  CLOPIDOGREL 75 MG Oral Tab, TAKE ONE TABLET BY MOUTH ONE TIME DAILY, Disp: 90 tablet, Rfl: 3  pantoprazole 40 MG Oral Tab EC, Take 1 tablet (40 mg total) by mouth every morning before breakfast., Disp: 90 tablet, Rfl: 3  METFORMIN 500 MG Oral Tab, TAKE 1 TABLET(500 MG) BY MOUTH DAILY WITH BREAKFAST, Disp: 90 tablet, Rfl: 3  potassium chloride 10 MEQ Oral Tab CR, Take 1 tablet (10 mEq total) by mouth daily. Dissolve in water for slurry, Disp: 90 tablet, Rfl: 3  Glucose Blood (ACCU-CHEK MONAE PLUS) In Vitro Strip, USE DAILY AS DIRECTED, Disp: 100 strip, Rfl: 3  Lancets Misc. (ACCU-CHEK SOFTCLIX LANCET DEV) Does not apply Kit, Use daily, Disp: 1 kit, Rfl: 1  Blood Glucose Monitoring Suppl (ACCU-CHEK MONAE PLUS) w/Device Does not apply Kit, Test daily, Disp: 1 kit, Rfl: 3  docusate sodium 50 MG Oral Cap, Take 50 mg by mouth 2 (two) times daily. , Disp: , Rfl:   Cholecalciferol (VITAMIN D3) 1000 units Oral Cap, Take 1 tablet by mouth daily. , Disp: 30 capsule, Rfl: 0  Vitamin B-12 1000 MCG Oral Tab, Take 1,000 mcg by mouth daily. , Disp: , Rfl:   aspirin 81 MG Oral Tab, Take 81 mg by mouth daily. , Disp: , Rfl:   Atorvastatin Calcium (LIPITOR) 40 MG Oral Tab, Take 1 tablet by mouth nightly.  , Disp: , Rfl:     No current facility-administered medications on file prior to visit. Allergies    Adhesive Tape           ITCHING, UNKNOWN    Physical Exam  Constitutional: no acute distress  HEENT: PERRL  Neck: supple, no JVD  Cardio: RRR, S1 S2  Respiratory: clear to auscultation bilaterally, no wheezing, rales, rhonchi, crackles  GI: abdomen soft, non tender, active bowel sounds, no organomegaly  Extremities: no clubbing, cyanosis, edema  Neurologic: no gross motor deficits  Skin: warm, dry  Lymphatic: no supraclavicular lymphadenopathy     Assessment  1. Dyspnea with exertion  2. Coronary artery disease    Plan  -Patient seen today for follow-up visit to pulmonary clinic. Dyspnea symptoms well controlled after starting nebulizer treatments. Advised patient to continue nebulizer treatments once to twice daily. Activity as tolerated.     Bernie Fierro DO  Pulmonary Medicine  [facility], [facility]  9/27/2022  11:15 AM

## 2022-09-29 ENCOUNTER — TELEPHONE (OUTPATIENT)
Dept: INTERNAL MEDICINE CLINIC | Facility: CLINIC | Age: 87
End: 2022-09-29

## 2022-09-29 NOTE — TELEPHONE ENCOUNTER
Mickey Flannery from [place] [address] is calling to let the doctor know the following:    Medication review  Nurse will visit 1 X weekly  Physical Therapy will come and do evaluation    Any questions call Mickey Flannery at [phone]

## 2022-09-30 NOTE — TELEPHONE ENCOUNTER
Yousuf Atwood / Kellen is calling she saw patient for Physical Therapy Evaluation  She will see patient 1 time a week for 5 weeks    Requesting verbal order for Occupation Therapy Evaluation    Please call [phone], confidential VM

## 2022-09-30 NOTE — TELEPHONE ENCOUNTER
Spoke to Jaqueline Hamm and gave verbal order ok for Occupational Therapy Evaluation, ok per protocol. She verbalized understanding.     FYI to Dr Charles Hernandez

## 2022-10-11 ENCOUNTER — TELEPHONE (OUTPATIENT)
Dept: INTERNAL MEDICINE CLINIC | Facility: CLINIC | Age: 87
End: 2022-10-11

## 2022-10-11 ENCOUNTER — HOSPITAL ENCOUNTER (OUTPATIENT)
Dept: CV DIAGNOSTICS | Facility: HOSPITAL | Age: 87
Discharge: HOME | End: 2022-10-11
Attending: INTERNAL MEDICINE
Payer: MEDICARE

## 2022-10-11 DIAGNOSIS — I35.1 NONRHEUMATIC AORTIC VALVE INSUFFICIENCY: ICD-10-CM

## 2022-10-11 DIAGNOSIS — I10 HYPERTENSION, BENIGN: ICD-10-CM

## 2022-10-11 PROCEDURE — 93306 TTE W/DOPPLER COMPLETE: CPT | Performed by: INTERNAL MEDICINE

## 2022-10-11 NOTE — TELEPHONE ENCOUNTER
Please let patient's wife know that 's echocardiogram came out fairly satisfactory. 1.  His heart muscle is strong without signs of weakness. However the heart muscle is somewhat \"stiff\". This is not unusual after people have had high blood pressure for years. This may sometimes contribute I believe to his lower extremity swelling. For now there is no changes that are needed. 2.  His aortic valve has a little bit of narrowing and leakage but again nothing major that I would think is contributing to his lower extremity edema. 3.  For now I cannot think of any changes for him.

## 2022-10-11 NOTE — TELEPHONE ENCOUNTER
Spoke to patient's wife Stephanie Grayson [facility] HIPAA) and relayed MD message. Pt verbalized understanding and agrees with plan.

## 2022-10-19 RX ORDER — POTASSIUM CHLORIDE 750 MG/1
TABLET, FILM COATED, EXTENDED RELEASE ORAL
Qty: 90 TABLET | Refills: 3 | Status: SHIPPED | OUTPATIENT
Start: 2022-10-19

## 2022-10-25 RX ORDER — IPRATROPIUM BROMIDE AND ALBUTEROL SULFATE 2.5; .5 MG/3ML; MG/3ML
SOLUTION RESPIRATORY (INHALATION)
Qty: 180 ML | Refills: 2 | Status: SHIPPED | OUTPATIENT
Start: 2022-10-25

## 2022-11-01 NOTE — TELEPHONE ENCOUNTER
[facility]re Diabetic Detailed Order Form completed and faxed to [facility] @ [phone], confirmation received. Original sent to scan. Complex Repair And Tissue Cultured Epidermal Autograft Text: The defect edges were debeveled with a #15 scalpel blade.  The primary defect was closed partially with a complex linear closure.  Given the location of the defect, shape of the defect and the proximity to free margins an tissue cultured epidermal autograft was deemed most appropriate to repair the remaining defect.  The graft was trimmed to fit the size of the remaining defect.  The graft was then placed in the primary defect, oriented appropriately, and sutured into place.

## 2022-11-09 ENCOUNTER — TELEPHONE (OUTPATIENT)
Dept: PULMONOLOGY | Facility: CLINIC | Age: 87
End: 2022-11-09

## 2022-11-18 NOTE — TELEPHONE ENCOUNTER
Completed inhalation detailed written order for Ipratropium-albuterol 0.5-2.5 mg/3ml was faxed to [facility] @ [phone]. Confirmation received. Sent for scanning.

## 2022-11-21 ENCOUNTER — TELEPHONE (OUTPATIENT)
Dept: INTERNAL MEDICINE CLINIC | Facility: CLINIC | Age: 87
End: 2022-11-21

## 2022-11-21 NOTE — TELEPHONE ENCOUNTER
[facility] is calling he saw patient late last week, patient's wife noticed he is having difficulty swallowing. Requesting order for Swallow Evaluation & request to recertify for nursing.     Please call [phone], confidential VM

## 2023-01-05 ENCOUNTER — TELEPHONE (OUTPATIENT)
Dept: INTERNAL MEDICINE CLINIC | Facility: CLINIC | Age: 88
End: 2023-01-05

## 2023-01-05 NOTE — TELEPHONE ENCOUNTER
Patient wife Giorgio Madrid is admitting patient to long term facility [facility]. Asking if Dr. Lacey Palomino has access to their system. Wife would like to speak to Dr. Lacey Palomino.      Best call back number [phone]

## 2023-01-05 NOTE — TELEPHONE ENCOUNTER
[facility] called and relayed Dr Octavio Reynaga message. Patient verbalized understanding with no further questions noted.

## 2023-01-05 NOTE — TELEPHONE ENCOUNTER
Message noted. 1.  Unfortunately, no, I do not have access to their medical record system. 2.  Usually in a long-term care facility Jean may be assigned a physician to overlook his care there.   When he is there please ask her to find out the name and phone number the physician and I can introduce myself as Jean's primary care physician and fill him in on any details that he should share about seems past history

## 2023-02-27 NOTE — TELEPHONE ENCOUNTER
Discussed with wife. Patient's swelling is going down. He has no specific localized pain over the left leg. There was no acute DVT. 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 I did research thrombosis of profunda vein. It appears that this vein is at low risk of embolization. For now I feel patient does not need any anticoagulation. I may redo venous Doppler in a couple months or so depending on patient's clinical status. Viewed labs with wife.   I will follow-up with patient in about 10 days Azelaic Acid Pregnancy And Lactation Text: This medication is considered safe during pregnancy and breast feeding.

## 2024-12-30 NOTE — TELEPHONE ENCOUNTER
Refill request is for a maintenance medication and has met the criteria specified in the Ambulatory Medication Refill Standing Order for eligibility, visits, laboratory, alerts and was sent to the requested pharmacy.     Requested Prescriptions     Signed P Was Ultrasound Performed Today?: Yes Calculate Total Cumulative Dose Automatically Or Manually: Manually Additional Change To Daily Dosage Administered Mid Treatment?: No Prescription Used: 1 Ultrasound Used Text: High frequency ultrasound depth is 0.86 mm, which is 0.06 mm in difference from previous imaging. Fraction Number: 5 Ultrasound Not Used Text: Ultrasound was not performed today due to Energy (Kv): 70 Bill For Simulation (Per Medicare, Typical Course Of Radiation Therapy Will Require Between One To Three Simulations): Yes- (Simple- 1 Site: 34141) Daily Dosage (Cgy): 272.16 Treatment Documentation: This patient has been treated today with image-guided superficial radiation therapy for non-melanoma skin cancer. Written informed consent has been previously obtained from this patient for this treatment. This consent is documented in the patient's chart. The patient gave verbal consent to continue treatment today. The patient was treated with a specific radiation dose and setup as prescribed by the provider listed on this visit note. A Radiation Therapist performed administration of radiation under the supervision of a provider. The treatment parameters and cumulative dose are indicated above. Prior to administering the radiation, the patient underwent a verification therapeutic radiology simulation-aided field setting defining relevant normal and abnormal target anatomy and acquiring images with separate and distinct diagnostic high-frequency ultrasound to delineate tissues and determine whether to proceed with delivery of therapeutic, in addition to retrieve data necessary to develop an optimal radiation treatment process for the patient. The field placement simulation documents any change seen in overall tumor volume documented in the patient’s record, allows the clinician to indicate any needed changes in the treatment plan and/or prescription, provides diagnostic evaluation as the basis for performing the therapeutic procedure, and clearly identifies the information needed to decide to proceed with the therapeutic procedure. This process includes verification of the treatment port(s) and proper treatment positioning. All treatment ports were photographed within electronic medical records. The patient's lead blocking along with gross tumor volume and margin was confirmed. Considering superficial radiotherapy is clinical in setup, this requires the physician and radiation therapist to clarify the location interest being treated against initial images, ultrasound, pathology, and patient anatomy. Care was taken to ensure salvador treated were geometrically accurate and properly positioned using therapeutic radiology simulation-aided field setting verification per fraction. This process is also utilized to determine if any prescription or setup changes are necessary. These steps are therefore medically necessary to ensure safe and effective administration of radiation. Ongoing therapeutic radiology simulation-aided field setting verification is ordered throughout the course of therapy.\\n\\nA high-frequency ultrasound image was acquired today for a two-dimensional evaluation of the tumor volume, depth, width, breadth, review of prior response to treatment, provide geometric accuracy of field placement, and determine whether to proceed with therapeutic delivery. \\n\\nThe field placement and ultrasound imaging, per fraction, is separate and distinct from the initial simulation and is an important task in providing safe administration of superficial radiation therapy. Physician evaluation of the ultrasound information will be ongoing throughout the course of treatment and is deemed medically necessary to ensure the efficacy of treatment, whether to proceed with therapeutic delivery, and determine any necessary changes. Today's images were evaluated for determination of continuation of treatment with the current plan or with necessary changes as appropriate. Additionally, the use of ultrasound visualization and targeted assessment allows the patient to be able to see their cancer(s) progress, encouraging the patient to complete and maintain compliance through the full course of prescribed radiotherapy. Per Dr. Donovan, continued ultrasound guidance and therapeutic radiology simulation-aided field setting verification per fraction is required for field placement, measurement of tumor depth, tissue evaluation, progress, acute effect monitoring, and determination for therapeutic treatment delivery is appropriate. Additional Comments (Add Customization Of Note Here): Treatment area is showing a well healed biopsy site. She has no concerns today. Total Cumulative Dose (Cgy): 1360.8 Add X Modifier?: BYRD - Unusual Non-Overlapping Service Additional Comments (Add Customization Of Note Here): Treatment area is showing a well healed biopsy site. She is using cream daily. Ultrasound Used Text: High frequency ultrasound depth is 0.78 mm, which is 0.06 mm in difference from previous imaging.

## (undated) DEVICE — DRAPE SHEET LAPAROTOMY

## (undated) DEVICE — STERILE LATEX POWDER-FREE SURGICAL GLOVESWITH NITRILE COATING: Brand: PROTEXIS

## (undated) DEVICE — MINOR GENERAL: Brand: MEDLINE INDUSTRIES, INC.

## (undated) DEVICE — STERILE (10.2 X 147CM) TELESCOPICALLY-FOLDED COVER: Brand: CIV-FLEX™ TRANSDUCER COVER

## (undated) DEVICE — SOL  .9 1000ML BTL

## (undated) NOTE — LETTER
[id number] [facility], [place]  [address], [address] [facility]  [address] [facility] [phone]        Dear Kadi Sandhu,      I had the pleasure of seeing your patient, David Grandchild on 12/11/2017.      Below please find a summary of our visi lisinopril (PRINIVIL,ZESTRIL) 20 MG Oral Tab Take 10 mg by mouth daily. Take one half tablet every day. Disp:  Rfl:    Omeprazole (PRILOSEC) 10 MG Oral Capsule Delayed Release Take 1 capsule by mouth daily.  Before a meal Disp:  Rfl:    Cholecalciferol (VI Problem Relation Age of Onset   • Cancer Father      lung cancer   • Heart Attack Mother    • Pulmonary Disease Brother    • Heart Attack Brother    • Actinic Keratosis Brother    • Heart Attack Brother    • MS [OTHER] Brother    • Breast Cancer Sister previous president, Fluent, conversational, repetition and naming intact. Short Term Memory intact on several attempts was 0 out of 4 and then 1 out of 4. Can recite history and follow commands. He had significant trouble spelling words backwards–pencil. Doxycycline Hyclate 100 MG Oral Cap Take 100 mg by mouth 2 (two) times daily. Disp:  Rfl:    Metoprolol Tartrate 50 MG Oral Tab TAKE 1 TABLET BY MOUTH TWICE A DAY WITH MEALS Disp: 180 tablet Rfl: 3   aspirin 81 MG Oral Tab Take 81 mg by mouth daily.  Disp:

## (undated) NOTE — IP AVS SNAPSHOT
[facility]            (For Outpatient Use Only) Initial Admit Date: 10/10/2017   Inpt/Obs Admit Date: Inpt: 10/10/17 / Obs: N/A   Discharge Date:    Clifton Binet:  [de-identified]   MRN: [de-identified]   CSN: [id number]        ENCOUNTER  Patient Cla Subscriber ID:  Pt Rel to Subscriber:    Hospital Account Financial Class: Medicare    October 13, 2017

## (undated) NOTE — ED AVS SNAPSHOT
Mallorie Shelby   MRN: [id number]    Department:  [facility] Emergency Department   Date of Visit:  12/11/2019           Disclosure     Insurance plans vary and the physician(s) referred by the ER may not be covered by your plan.  Please contact yo within the next three months to obtain basic health screening including reassessment of your blood pressure.     IF THERE IS ANY CHANGE OR WORSENING OF YOUR CONDITION, CALL YOUR PRIMARY CARE PHYSICIAN AT ONCE OR RETURN IMMEDIATELY TO THE EMERGENCY DEPARTMEN

## (undated) NOTE — LETTER
Nivia Christensen Md  [address], [address]       06/27/17        Patient: Dominik Holland   YOB: 1935   Date of Visit: 6/26/2017       Dear  Dr. Shawna Segura MD,      Thank you for referring Dominik Holland to my practice.   Shane

## (undated) NOTE — IP AVS SNAPSHOT
Patient Demographics     Address   [address] [place] [address] Phone  [phone] [facility]) *Preferred*      Emergency Contact(s)     Name Relation Home Work [address] Spouse [phone]    Mao Jacobsen OTHER   [phone]  [address]  [address]  [phone]             Avril Perez MD. Schedule an appointment as soon as possible for a visit in 2 weeks.     Specialty:  SURGERY, GENERAL  Why:  Call for Follow-up postoperatively  Contact yancy sodium chloride 0.9 % SOLN 500 mL with Vancomycin HCl 500 MG SOLR 1.75 g           [id number] - MAR ACTION REPORT  (last 24 hrs)    ** SITE UNKNOWN **     Order ID Medication Name Action Time Action Reason Comments    [id number] HYDROcodone-acetaminophen (NO Result status: Final result   Ordering provider:  Je Chinchilla MD  10/13/17 1526 Resulting lab:  [place] [facility]    Specimen Information    Type Source Collected On   Blood — 10/13/17 1642          Components    Component Value Reference Range Flag Lab Component Value Reference Range Flag Lab   Magnesium 1.8 1.8 - 2.5 mg/dL [facility]            PHOSPHORUS [[id number]] (Normal)  Resulted: 10/13/17 0741, Result status: Final result   Ordering provider:  Gretchen Porras MD  10/12/17 2300 Resulting l H&P - H&P Note      H&P signed by Benjie Francisco MD at 10/10/2017  3:13 PM  Version 1 of 1    Author:  Benjie Francisco MD Service:  General Surgery Author Type:  Physician    Filed:  10/10/2017  3:13 PM Date of Service:  10/10/2017  3:11 PM Status:  Signed   cardiologist dr. Melissa Mott   • Calcaneal spur, left 04-     xray/readjust insole   • Diverticulosis     • Hallux rigidus of left foot 04-17-21007     xray/readjust insole   • Hepatic cyst     • Hiatal hernia     • Internal hemorrhoids     • Maligna MUSCULOSKELETAL: no joint complaints upper or lower extremities  NEURO: no sensory or motor complaint  HEMATOLOGY: no bruising or excessive bleeding; anticoagulants: aspirin  ENDOCRINE: denies excessive thirst or urination; denies unexpected wt gain or wt I discussed with the patient and wife findings. .  To be a mass into the abdominal wall not a hernia. I explained possible ability of being a tumor though clinically severe to be most likely inflammatory.   There was some area of fluid or collection in retr hypertension, hyperlipidemia, CAD status post CABG, salivary gland cancer, knee and hip arthroplasty who presents to the hospital on 10/10 with complaints of swelling with tenderness in the left lower quadrant lateral to the inguinal area where he had a he No date: APPENDECTOMY  1994: CABG  1996: CHOLECYSTECTOMY      Comment: laparoscopic cholecystectomy dr. Briana Bermudez  11/24/15 : HERNIA SURGERY      Comment: robotic right inguinal hernia repair with mesh  4/7/2016: HERNIA SURGERY      Comment: robotic repair •  ondansetron HCl (ZOFRAN) injection 4 mg, 4 mg, Intravenous, Q6H PRN  •  acetaminophen (TYLENOL) tab 650 mg, 650 mg, Oral, Q6H PRN  •  HYDROmorphone HCl PF (DILAUDID) 1 MG/ML injection 0.2 mg, 0.2 mg, Intravenous, Q2H PRN **OR** HYDROmorphone HCl PF (DIL HEENT: Moist mucous membranes. [VD.1] EOMI[VD. 2]  Neck: No lymphadenopathy. Supple. Respiratory: Clear to auscultation bilaterally. No wheezes. No rhonchi. Cardiovascular: RRR  Abdomen: Soft, nontender, nondistended.    Musculoskeletal: No edema noted  I for I&D and partial removal of inguinal mesh   - Bactrim as outpatient  # CAD s/p CABG[VD. 2]    PLAN:[VD.1]    - continue IV vancomycin  - PICC in place   - anticipate discharge on IV abx and PO thereafter  - reviewed labs, micro, imaging reports  - discus 59-90 High Risk  29-58 Medium Risk  0-28   Low Risk    Risk of readmission: Jean Diane has Low Risk of readmission after discharge from the hospital.    Problem List: Patient Active Problem List:     Aortic valve disorder     Hypertension, benign     Cor with the last couple years.  Patient's had no nausea or vomiting.  Medial to eat normally.  No fevers or chills.  Patient did not notice previous mass in this area.     10/5/2017  After last visit, patient had a CT scan which showed a mass in the area. Piter Levine lisinopril 20 MG Tabs  Commonly known as:  PRINIVIL,ZESTRIL      Take 10 mg by mouth daily. Take one half tablet every day.    Refills:  0     Metoprolol Tartrate 50 MG Tabs  Commonly known as:  LOPRESSOR      TAKE 1 TABLET BY MOUTH TWICE A DAY WITH MEALS Zoster (Shingles) 05/01/12       Future Appointments        Provider Brandi Mancia    11/21/2017 10:00 AM Bernardo Ruffin MD [facility], [address] [place] [address]

## (undated) NOTE — MR AVS SNAPSHOT
[facility] [place]  [address] [place] [address]  [phone]               Thank you for choosing us for your health care visit with Jackie Mcneil MD.  We are glad to serve you and happy to provide you with this summary of your visit.   Please Today's Vital Signs     BP Pulse Temp Weight          126/66 mmHg 74 99 °F (37.2 °C) 206 lb 3.2 oz (93.532 kg)           Current Medications          This list is accurate as of: 5/23/17  1:54 PM.  Always use your most recent med list. including white bread, rice and pasta   Eat plenty of protein, keep the fat content low Sugars:  sodas and sports drinks, candies and desserts   Eat plenty of low-fat dairy products High fat meats and dairy   Choose whole grain products Foods high in sodiu

## (undated) NOTE — LETTER
Mauricio Cho, [address] [facility] [address]       07/22/19        Patient: Carmen Martinez   YOB: 1935   Date of Visit: 7/22/2019       Dear  Dr. Liliane Gamez MD,      Thank you for referring Carmen Martinez to my practice.   Please

## (undated) NOTE — ED AVS SNAPSHOT
Sukumar Mcmahon   MRN: [id number]    Department:  [facility] Emergency Department   Date of Visit:  11/3/2017           Disclosure     Insurance plans vary and the physician(s) referred by the ER may not be covered by your plan.  Please contact you CARE PHYSICIAN AT ONCE OR RETURN IMMEDIATELY TO THE EMERGENCY DEPARTMENT. If you have been prescribed any medication(s), please fill your prescription right away and begin taking the medication(s) as directed.   If you believe that any of the medications

## (undated) NOTE — ED AVS SNAPSHOT
Britany    MRN: [id number]    Department:  [facility] Emergency Department   Date of Visit:  11/11/2019           Disclosure     Insurance plans vary and the physician(s) referred by the ER may not be covered by your plan.  Please contact yo within the next three months to obtain basic health screening including reassessment of your blood pressure.     IF THERE IS ANY CHANGE OR WORSENING OF YOUR CONDITION, CALL YOUR PRIMARY CARE PHYSICIAN AT ONCE OR RETURN IMMEDIATELY TO THE EMERGENCY DEPARTMEN